# Patient Record
Sex: FEMALE | Race: WHITE | NOT HISPANIC OR LATINO | Employment: OTHER | URBAN - METROPOLITAN AREA
[De-identification: names, ages, dates, MRNs, and addresses within clinical notes are randomized per-mention and may not be internally consistent; named-entity substitution may affect disease eponyms.]

---

## 2019-04-23 ENCOUNTER — OFFICE VISIT (OUTPATIENT)
Dept: OTOLARYNGOLOGY | Facility: CLINIC | Age: 64
End: 2019-04-23
Payer: MEDICARE

## 2019-04-23 VITALS — HEART RATE: 71 BPM | DIASTOLIC BLOOD PRESSURE: 61 MMHG | SYSTOLIC BLOOD PRESSURE: 140 MMHG

## 2019-04-23 DIAGNOSIS — C06.9 MALIGNANT NEOPLASM OF ORAL CAVITY: Primary | ICD-10-CM

## 2019-04-23 PROCEDURE — 99204 PR OFFICE/OUTPT VISIT, NEW, LEVL IV, 45-59 MIN: ICD-10-PCS | Mod: S$GLB,,, | Performed by: OTOLARYNGOLOGY

## 2019-04-23 PROCEDURE — 1101F PR PT FALLS ASSESS DOC 0-1 FALLS W/OUT INJ PAST YR: ICD-10-PCS | Mod: CPTII,S$GLB,, | Performed by: OTOLARYNGOLOGY

## 2019-04-23 PROCEDURE — 99999 PR PBB SHADOW E&M-NEW PATIENT-LVL III: CPT | Mod: PBBFAC,,, | Performed by: OTOLARYNGOLOGY

## 2019-04-23 PROCEDURE — 1101F PT FALLS ASSESS-DOCD LE1/YR: CPT | Mod: CPTII,S$GLB,, | Performed by: OTOLARYNGOLOGY

## 2019-04-23 PROCEDURE — 99999 PR PBB SHADOW E&M-NEW PATIENT-LVL III: ICD-10-PCS | Mod: PBBFAC,,, | Performed by: OTOLARYNGOLOGY

## 2019-04-23 PROCEDURE — 99204 OFFICE O/P NEW MOD 45 MIN: CPT | Mod: S$GLB,,, | Performed by: OTOLARYNGOLOGY

## 2019-04-23 NOTE — PROGRESS NOTES
Head and Neck Surgery Consult    Seen in consultation from Dr. Johnson    HPI: Jennifer Andre is a 63 y.o. female presenting with an extensive and complicated oral cancer history beginning in 2013. 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 She has now developed a R-sided RMT SCC and presents for treatment. The lesion has been there for ~3 weeks and is only intermittently painful when eating pointy foods, such as chips. She denies new neck masses or voice changes. She is no longer smoking.    History reviewed. No pertinent past medical history.    History reviewed. No pertinent surgical history.    No current outpatient medications on file.    Review of patient's allergies indicates:   Allergen Reactions    Bactrim [sulfamethoxazole-trimethoprim]     Clindamycin     Keflex [cephalexin]     Lortab [hydrocodone-acetaminophen]        History reviewed. No pertinent family history.    Social History     Socioeconomic History    Marital status:      Spouse name: Not on file    Number of children: Not on file    Years of education: Not on file    Highest education level: Not on file   Occupational History    Not on file   Social Needs    Financial resource strain: Not on file    Food insecurity:     Worry: Not on file     Inability: Not on file    Transportation needs:     Medical: Not on file     Non-medical: Not on file   Tobacco Use    Smoking status: Former Smoker    Smokeless tobacco: Never Used   Substance and Sexual Activity    Alcohol use: Not on file    Drug use: Not on file    Sexual activity: Not on file   Lifestyle    Physical activity:     Days per week: Not on file     Minutes per session: Not on file    Stress: Not on file   Relationships    Social connections:     Talks on phone: Not on file     Gets together: Not on file     Attends Orthodoxy service: Not on  file     Active member of club or organization: Not on file     Attends meetings of clubs or organizations: Not on file     Relationship status: Not on file   Other Topics Concern    Not on file   Social History Narrative    Not on file       Review of Systems -  Constitutional: Denies having night sweats, constant fatigue, loss of appetite or recent substantial weight loss.  Eyes: Denies blurred vision or double vision.  Respiratory: Denies symptoms of shortness of breath, noisy breathing, hoarseness or chronic cough.  GI: Denies symptoms of heartburn, acid regurgitation, or the known presence of a hiatal hernia.  The remainder of a 10-point review of systems is negative    REVIEW OF RADIOLOGICAL FILMS AND RECORDS (PERSONALLY REVIEWED):  Reports from pathology reviewed - [facility]    REVIEW OF LABS (PERSONALLY REVIEWED)  Noncontributory    PHYSICAL EXAM:  Vitals - BP (!) 140/61 (Patient Position: Sitting)   Pulse 71   Constitutional -      General Appearance: well developed, well nourished, without obvious deformities apart from well-healed Commando incisions and bilateral MRND scars     Communication: speaks with a normal voice without hoarseness  Head & Face -     Overall: no obvious scars, lesions or masses except above. Remaining mandible centrally and on L is VERY atrophic, essentially the width of a recon plate. The R RMT/ramus area appears more substantial.     Parotid and submandibular glands: no masses or tenderness     Facial strength: normal and equal bilaterally  Eyes -      EOM intact  Ear, Nose, Mouth & Throat -     Ears: both left and right external auditory canals and TM's are normal, no external deformities     Nasal exam: mucosa is pink, septum is midline, visible turbinates are normal on anterior rhinoscopy     Mastication: teeth appear edentulous     Oral Cavity and oropharynx: mucosa, hard and soft palates, tongue, posterior pharyngeal wall, lips and gums are wihtout lesions apart from a 2cm  slightly mobile erythroleukoplakic lesion of the R RMT/buccal mucosa. Tonsils appear minimal  Respiratory:     Breathing unlabored, no stridor  Cardiovascular:     No JVD, capillary refill normal  Larynx: using the mirror for indirect laryngoscopy, the epiglottic, false cords, true cords, and pyriform sinuses are without lesions and the true vocal cords move normally  Neck: appears symmetric, and on palpation is without masses   Endocrine:     Thyroid: no asymmetry, thyromegaly, or thyroid nodules on palpation  Lymphatic:     No cervical lymphadenopathy  Cranial Nerves:      II: Pupillary reflexes normal     III, IV, VI: EOM normal     V: 1,2,3: normal sensation     VII: Normal strength in all divisions     IX, X: Normal voice, palatal elevation and sensation     XI: Shoulder strength normal       XII: Tongue mobility normal  Psychiatric:     Appropriate affect    ASSESSMENT: SCC of oral cavirt    PLAN: We discussed the optimal treatment will be surgical, and the extent of the surgery will depend upon bony involvement. If there is no bone involvement I would recommend a marginal mandibulectomy and likely a buccinator flap reconstruction. If there is bony involvement the resection will be much larger - segmental versus subtotal mandibulectomy with fibula free flap reconstruction. I will order STAT CT with 3D recon to assess bony invasion status as well as remaining mandibular anatomy. If there is tayo bone invasion we will prepare for a free flap with subsequent CT with contrast of neck and lower extremities. If there is no bony involvement we will proceed with surgery forthwith.  They will RTC with me in 1 week to review CT scan and plan treatment.       Navdeep Dangelo

## 2019-04-23 NOTE — LETTER
April 23, 2019      Chintan Johnson MD  [address]   [address]  [facility]  [place] [address]           [facility] S[facility]  [address]  [facility] [address]  Phone: [phone]  Fax: [phone]          Patient: Jennifer Andre   MR Number: [id number]   YOB: 1955   Date of Visit: 4/23/2019       Dear Dr. Chintan Johnson:    Thank you for referring Jennifer Andre to me for evaluation. Attached you will find relevant portions of my assessment and plan of care.    If you have questions, please do not hesitate to call me. I look forward to following Jennifer Andre along with you.    Sincerely,    Navdeep Dangelo MD    Enclosure  CC:  No Recipients    If you would like to receive this communication electronically, please contact [email or web address] or [phone] to request more information on [employer] Link access.    For providers and/or their staff who would like to refer a patient to [facility], please contact us through our one-stop-shop provider referral line, [facility], at [phone].    If you feel you have received this communication in error or would no longer like to receive these types of communications, please e-mail [email or web address]

## 2019-04-25 ENCOUNTER — TELEPHONE (OUTPATIENT)
Dept: OTOLARYNGOLOGY | Facility: CLINIC | Age: 64
End: 2019-04-25

## 2019-04-25 NOTE — TELEPHONE ENCOUNTER
----- Message from Kell Johnson sent at 4/25/2019 10:15 AM CDT -----  Contact: Pt  Needs Advice    Reason for call: Pt has questions about her 04/30 appt         Communication Preference: # [phone]    Additional Information:

## 2019-05-07 ENCOUNTER — OFFICE VISIT (OUTPATIENT)
Dept: OTOLARYNGOLOGY | Facility: CLINIC | Age: 64
End: 2019-05-07
Payer: MEDICARE

## 2019-05-07 VITALS
HEART RATE: 76 BPM | DIASTOLIC BLOOD PRESSURE: 77 MMHG | TEMPERATURE: 98 F | SYSTOLIC BLOOD PRESSURE: 128 MMHG | WEIGHT: 165.56 LBS

## 2019-05-07 DIAGNOSIS — C06.9 MALIGNANT NEOPLASM OF ORAL CAVITY: Primary | ICD-10-CM

## 2019-05-07 PROCEDURE — 99999 PR PBB SHADOW E&M-EST. PATIENT-LVL IV: CPT | Mod: PBBFAC,,, | Performed by: OTOLARYNGOLOGY

## 2019-05-07 PROCEDURE — 99999 PR PBB SHADOW E&M-EST. PATIENT-LVL IV: ICD-10-PCS | Mod: PBBFAC,,, | Performed by: OTOLARYNGOLOGY

## 2019-05-07 PROCEDURE — 1101F PR PT FALLS ASSESS DOC 0-1 FALLS W/OUT INJ PAST YR: ICD-10-PCS | Mod: CPTII,S$GLB,, | Performed by: OTOLARYNGOLOGY

## 2019-05-07 PROCEDURE — 99214 PR OFFICE/OUTPT VISIT, EST, LEVL IV, 30-39 MIN: ICD-10-PCS | Mod: S$GLB,,, | Performed by: OTOLARYNGOLOGY

## 2019-05-07 PROCEDURE — 1101F PT FALLS ASSESS-DOCD LE1/YR: CPT | Mod: CPTII,S$GLB,, | Performed by: OTOLARYNGOLOGY

## 2019-05-07 PROCEDURE — 99214 OFFICE O/P EST MOD 30 MIN: CPT | Mod: S$GLB,,, | Performed by: OTOLARYNGOLOGY

## 2019-05-07 NOTE — PROGRESS NOTES
Head & Neck Surgery Follow-Up    Treatment History:  1. SCC - L Segmental mandibulectomy, chemo/RT - [facility] - 2013  2. L mandible ORN, osteocutaneous RFFF - [facility] - ~2015  3. Biopsy R RMT - [place] - 04/2018    Interval history:  She has undergone [place] in [place] demonstrating no tayo bony invasion but with a very, very atrophic remaining mandible. 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 She reports slightly increased tenderness of the R RMT lesion but no other changes.    Exam:  Ulcerative lesion of R gingiva unchanged  No palpable LAD  Scar band of L buccal mucosa is cause of extra-articular trismus    CT: Severely atrophic native mandible, good bony continuity of reconstructed forearm flap. Small LAD present submental area (0.7cm)    A/P: Discussed no radiographic evidence of bony invasion, however for structural reasons may likely require segmental mandibulectomy. Given her trismus symptoms and the status of the forearm bone construct, near-total resection and fibula flap may provide best option for functional rehabilitation. Options include repositioning/double-barreling the forearm bone in situ then re-plating to contralateral fibula construct versus removal of prior construct and subtotal reconstruction with one or both fibulas. We discussed the priorities of this operation as 1: remove recurrent cancer 2: restore functioning mandible and finally 3: trismus/dental rehabilitation. All of these may not be possible, or not possible in a single operation. We also discussed the need for PEG and trach. Will plan on utilizing the previous Commando incision she already has in the neck.

## 2019-05-10 ENCOUNTER — ANESTHESIA EVENT (OUTPATIENT)
Dept: SURGERY | Facility: HOSPITAL | Age: 64
DRG: 012 | End: 2019-05-10
Payer: MEDICARE

## 2019-05-10 DIAGNOSIS — Z01.818 PREOPERATIVE TESTING: Primary | ICD-10-CM

## 2019-05-10 RX ORDER — CLOBETASOL PROPIONATE 0.5 MG/G
CREAM TOPICAL 2 TIMES DAILY PRN
COMMUNITY
End: 2022-09-22 | Stop reason: CLARIF

## 2019-05-10 NOTE — PRE-PROCEDURE INSTRUCTIONS
Patient stated has not had any problems with anesthesia in the past. Will need medical clearance from your PCP, Dr. Marcello Benavidez. Will also need labs and ekg. She is not planning to come to [place] until her surgery.Our office will send your PCP what we need and the fax number to send the test results and clearance. Preop instructions given. Hold asa, asa containing products, nsaids, vitamins and supplements one week prior to surgery. Medication instruction given. Shower the night before surgery and the morning of surgery with an antibacterial soap( hibiclens or dial antibacterial soap).  Nothing on the skin once shower. Do not apply any deodorant, lotion, powder, perfume,or aftershave.  No makeup or moisturizer. No fingernail polish or jewelry going to surgery.   May have solid foods, gum, and hard candy until 8 hours before surgery/procedure time.  May have clear liquids( water, gatorade, powerade or apple juice) until 2 hours prior to surgery/procedure time.  No red or orange drinks. If in doubt , drink water. Nothing to drink 2 hours before arrival time for surgery/procedure. If you are told to take medication in the morning of surgery, it may be taken with a sip of water. If your doctor tells you something different pertaining to when to stop eating or drinking, follow your doctor's instructions.   Call for changes in status or questions.   Directions given to the surgery center. Verbalizes understanding.

## 2019-05-10 NOTE — ANESTHESIA PREPROCEDURE EVALUATION
Anesthesia Assessment: Preoperative EQUATION     Planned Procedure: Procedure(s) (LRB):  MANDIBULECTOMY (Right)  TRACHEOTOMY (N/A)  DISSECTION, NECK (Right)  CREATION, FREE FLAP (N/A)  REMOVAL, IMPLANT (Left)  Requested Anesthesia Type:General  Surgeon: Wallace Santiago MD  Service: ENT  Known or anticipated Date of Surgery:5/30/2019     Surgeon notes: reviewed     Electronic QUestionnaire Assessment completed via nurse interview with patient.    NO AQ  Triage considerations:      The patient has no apparent active cardiac condition (No unstable coronary Syndrome such as severe unstable angina or recent [<1 month] myocardial infarction, decompensated CHF, severe valvular   disease or significant arrhythmia)     Previous anesthesia records:No problems and Not available     Last PCP note: outside [facility] , HAS APPT FOR 5/14  Subspecialty notes: ENT     Other important co-morbidities: MALIGNANT NEOPLASM OF ORAL CAVITY     Tests already available:  No recent tests.                            Instructions given. (See in Nurse's note)     Optimization:      Medical Opinion Indicated                                        Plan:    Testing:  Hemoglobin, BMP, T&S, EKG and HEMATOCRIT                              Consultation:Patient's PCP for re-evaluation Patient's PCP for a statement of optimization                            Patient  has previously scheduled Medical Appointment:NONE     Navigation: Tests Scheduled. TBD                        Consults scheduled.5/14                        Results will be tracked by Preop Clinic.                           S          Electronically signed by Chelly Anna RN at 5/10/2019 11:15 AM       Pre-admit on 5/30/2019            Detailed Report     5/21 Labs and EKG resulted and noted.Medical clearance from [facility] on 5/12. ( all scanned to surgery)  5/21 EKG reviewed by Dr. Young.                                                                                                          05/10/2019  Jennifer Andre is a 63 y.o., female.    Anesthesia Evaluation         Review of Systems  Anesthesia Hx:  No problems with previous Anesthesia Denies Family Hx of Anesthesia complications.    Social:  Former Smoker, No Alcohol Use    Hematology/Oncology:  Hematology Normal       Oral Current/Recent Cancer.  --  Cancer in past history:  chemotherapy, radiation and surgery  Oncology Comments: 2013 ORAL CANCER BEGAN, CURRENT: MALIGNANT NEOPLASM OF ORAL CAVITY     EENT/Dental:   Jaw Problems: LIMITED OPENING OF MOUTH  Cardiovascular:   Exercise tolerance: good  Denies Angina.  Functional Capacity 4 METS, ABLE TO CLIMB 2 FLIGHTS OF STAIRS    Pulmonary:  Pulmonary Normal  Denies Shortness of breath.  Denies Recent URI.    Renal/:  Renal/ Normal     Hepatic/GI:  Hepatic/GI Normal    Musculoskeletal:  Musculoskeletal General/Symptoms: Functional capacity is ambulatory without assistance.    Neurological:  Neurology Normal    Endocrine:  Endocrine Normal    Dermatological:   PSORIASIS   Psych:  Psychiatric Normal           Physical Exam  General:  Well nourished    Airway/Jaw/Neck:  Airway Findings: Mouth Opening: Small, but > 3cm Tongue: Normal  General Airway Assessment: Adult  Oropharynx Findings: (mandibulectomy left )  Mallampati: II  TM Distance: 4 - 6 cm       Chest/Lungs:  Chest/Lungs Clear    Heart/Vascular:  Heart Findings: Normal            Anesthesia Plan  Type of Anesthesia, risks & benefits discussed:  Anesthesia Type:  general  Patient's Preference:   Intra-op Monitoring Plan: arterial line  Intra-op Monitoring Plan Comments:   Post Op Pain Control Plan: multimodal analgesia, IV/PO Opioids PRN and per primary service following discharge from PACU  Post Op Pain Control Plan Comments: Opioids and analgesic adjuvants as needed  Regional blocks if applicable/indicated  Induction:   IV  Beta Blocker:  Patient is not currently on a Beta-Blocker (No further documentation  required).       Informed Consent: Patient understands risks and agrees with Anesthesia plan.  Questions answered. Anesthesia consent signed with patient.  ASA Score: 3     Day of Surgery Review of History & Physical:    H&P update referred to the surgeon.     Anesthesia Plan Notes: I have personally evaluated the patient and discussed risk/benefits/alternatives of general anesthesia.        Ready For Surgery From Anesthesia Perspective.

## 2019-05-10 NOTE — PRE ADMISSION SCREENING
Anesthesia Assessment: Preoperative EQUATION    Planned Procedure: Procedure(s) (LRB):  MANDIBULECTOMY (Right)  TRACHEOTOMY (N/A)  DISSECTION, NECK (Right)  CREATION, FREE FLAP (N/A)  REMOVAL, IMPLANT (Left)  Requested Anesthesia Type:General  Surgeon: Wallace Santiago MD  Service: ENT  Known or anticipated Date of Surgery:5/30/2019    Surgeon notes: reviewed    Electronic QUestionnaire Assessment completed via nurse interview with patient.    NO AQ  Triage considerations:     The patient has no apparent active cardiac condition (No unstable coronary Syndrome such as severe unstable angina or recent [<1 month] myocardial infarction, decompensated CHF, severe valvular   disease or significant arrhythmia)    Previous anesthesia records:No problems and Not available    Last PCP note: outside [facility] , HAS APPT FOR 5/14  Subspecialty notes: ENT    Other important co-morbidities: MALIGNANT NEOPLASM OF ORAL CAVITY     Tests already available:  No recent tests.            Instructions given. (See in Nurse's note)    Optimization:      Medical Opinion Indicated           Plan:    Testing:  Hemoglobin, BMP, T&S, EKG and HEMATOCRIT        Consultation:Patient's PCP for re-evaluation Patient's PCP for a statement of optimization      Patient  has previously scheduled Medical Appointment:NONE    Navigation: Tests Scheduled. TBD             Consults scheduled.5/14             Results will be tracked by Preop Clinic.                 S

## 2019-05-29 ENCOUNTER — TELEPHONE (OUTPATIENT)
Dept: OTOLARYNGOLOGY | Facility: CLINIC | Age: 64
End: 2019-05-29

## 2019-05-30 ENCOUNTER — ANESTHESIA (OUTPATIENT)
Dept: SURGERY | Facility: HOSPITAL | Age: 64
DRG: 012 | End: 2019-05-30
Payer: MEDICARE

## 2019-05-30 ENCOUNTER — HOSPITAL ENCOUNTER (INPATIENT)
Facility: HOSPITAL | Age: 64
LOS: 12 days | Discharge: HOME HEALTH | DRG: 012 | End: 2019-06-11
Attending: OTOLARYNGOLOGY | Admitting: OTOLARYNGOLOGY
Payer: MEDICARE

## 2019-05-30 DIAGNOSIS — Z01.818 PREOPERATIVE TESTING: ICD-10-CM

## 2019-05-30 DIAGNOSIS — C06.9 MALIGNANT NEOPLASM OF ORAL CAVITY: Primary | ICD-10-CM

## 2019-05-30 DIAGNOSIS — E44.0 MODERATE MALNUTRITION: ICD-10-CM

## 2019-05-30 DIAGNOSIS — R26.89 DECREASED MOBILITY: ICD-10-CM

## 2019-05-30 DIAGNOSIS — Z93.0 TRACHEOSTOMY IN PLACE: ICD-10-CM

## 2019-05-30 DIAGNOSIS — K21.9 GASTROESOPHAGEAL REFLUX DISEASE, ESOPHAGITIS PRESENCE NOT SPECIFIED: ICD-10-CM

## 2019-05-30 LAB
ABO + RH BLD: NORMAL
ALBUMIN SERPL BCP-MCNC: 2.6 G/DL (ref 3.5–5.2)
ALLENS TEST: ABNORMAL
ALP SERPL-CCNC: 72 U/L (ref 55–135)
ALT SERPL W/O P-5'-P-CCNC: 8 U/L (ref 10–44)
ANION GAP SERPL CALC-SCNC: 9 MMOL/L (ref 8–16)
APTT BLDCRRT: 21 SEC (ref 21–32)
AST SERPL-CCNC: 17 U/L (ref 10–40)
BASOPHILS # BLD AUTO: 0.04 K/UL (ref 0–0.2)
BASOPHILS NFR BLD: 0.3 % (ref 0–1.9)
BILIRUB SERPL-MCNC: 0.3 MG/DL (ref 0.1–1)
BLD GP AB SCN CELLS X3 SERPL QL: NORMAL
BUN SERPL-MCNC: 11 MG/DL (ref 8–23)
CALCIUM SERPL-MCNC: 7.3 MG/DL (ref 8.7–10.5)
CHLORIDE SERPL-SCNC: 110 MMOL/L (ref 95–110)
CO2 SERPL-SCNC: 20 MMOL/L (ref 23–29)
CREAT SERPL-MCNC: 0.7 MG/DL (ref 0.5–1.4)
DELSYS: ABNORMAL
DIFFERENTIAL METHOD: ABNORMAL
EOSINOPHIL # BLD AUTO: 0 K/UL (ref 0–0.5)
EOSINOPHIL NFR BLD: 0 % (ref 0–8)
ERYTHROCYTE [DISTWIDTH] IN BLOOD BY AUTOMATED COUNT: 13.6 % (ref 11.5–14.5)
EST. GFR  (AFRICAN AMERICAN): >60 ML/MIN/1.73 M^2
EST. GFR  (NON AFRICAN AMERICAN): >60 ML/MIN/1.73 M^2
FIO2: 28
FLOW: 5
GLUCOSE SERPL-MCNC: 142 MG/DL (ref 70–110)
HCO3 UR-SCNC: 21.4 MMOL/L (ref 24–28)
HCT VFR BLD AUTO: 33.2 % (ref 37–48.5)
HGB BLD-MCNC: 10.6 G/DL (ref 12–16)
IMM GRANULOCYTES # BLD AUTO: 0.07 K/UL (ref 0–0.04)
IMM GRANULOCYTES NFR BLD AUTO: 0.5 % (ref 0–0.5)
INR PPP: 1.1 (ref 0.8–1.2)
LYMPHOCYTES # BLD AUTO: 0.5 K/UL (ref 1–4.8)
LYMPHOCYTES NFR BLD: 3.6 % (ref 18–48)
MAGNESIUM SERPL-MCNC: 1.7 MG/DL (ref 1.6–2.6)
MCH RBC QN AUTO: 29.8 PG (ref 27–31)
MCHC RBC AUTO-ENTMCNC: 31.9 G/DL (ref 32–36)
MCV RBC AUTO: 93 FL (ref 82–98)
MODE: ABNORMAL
MONOCYTES # BLD AUTO: 0.6 K/UL (ref 0.3–1)
MONOCYTES NFR BLD: 4.5 % (ref 4–15)
NEUTROPHILS # BLD AUTO: 12.2 K/UL (ref 1.8–7.7)
NEUTROPHILS NFR BLD: 91.1 % (ref 38–73)
NRBC BLD-RTO: 0 /100 WBC
PCO2 BLDA: 36.4 MMHG (ref 35–45)
PH SMN: 7.38 [PH] (ref 7.35–7.45)
PHOSPHATE SERPL-MCNC: 3.7 MG/DL (ref 2.7–4.5)
PLATELET # BLD AUTO: 160 K/UL (ref 150–350)
PMV BLD AUTO: 10.9 FL (ref 9.2–12.9)
PO2 BLDA: 77 MMHG (ref 80–100)
POC BE: -4 MMOL/L
POC SATURATED O2: 95 % (ref 95–100)
POC TCO2: 22 MMOL/L (ref 23–27)
POCT GLUCOSE: 135 MG/DL (ref 70–110)
POTASSIUM SERPL-SCNC: 4.4 MMOL/L (ref 3.5–5.1)
PROT SERPL-MCNC: 5.2 G/DL (ref 6–8.4)
PROTHROMBIN TIME: 11.5 SEC (ref 9–12.5)
RBC # BLD AUTO: 3.56 M/UL (ref 4–5.4)
SAMPLE: ABNORMAL
SITE: ABNORMAL
SODIUM SERPL-SCNC: 139 MMOL/L (ref 136–145)
WBC # BLD AUTO: 13.44 K/UL (ref 3.9–12.7)

## 2019-05-30 PROCEDURE — 99291 PR CRITICAL CARE, E/M 30-74 MINUTES: ICD-10-PCS | Mod: GC,,, | Performed by: ANESTHESIOLOGY

## 2019-05-30 PROCEDURE — 41116 PR EXCIS FLOOR MOUTH LESION: ICD-10-PCS | Mod: 51,,, | Performed by: OTOLARYNGOLOGY

## 2019-05-30 PROCEDURE — 37799 UNLISTED PX VASCULAR SURGERY: CPT

## 2019-05-30 PROCEDURE — 27201037 HC PRESSURE MONITORING SET UP

## 2019-05-30 PROCEDURE — 15757 PR FREE SKIN FLAP W MICROVASC ANAST: ICD-10-PCS | Mod: ,,, | Performed by: OTOLARYNGOLOGY

## 2019-05-30 PROCEDURE — 20000000 HC ICU ROOM

## 2019-05-30 PROCEDURE — D9220A PRA ANESTHESIA: Mod: CRNA,,, | Performed by: NURSE ANESTHETIST, CERTIFIED REGISTERED

## 2019-05-30 PROCEDURE — 88331 TISSUE SPECIMEN TO PATHOLOGY - SURGERY: ICD-10-PCS | Mod: 26,,, | Performed by: PATHOLOGY

## 2019-05-30 PROCEDURE — 99900026 HC AIRWAY MAINTENANCE (STAT)

## 2019-05-30 PROCEDURE — D9220A PRA ANESTHESIA: ICD-10-PCS | Mod: CRNA,,, | Performed by: NURSE ANESTHETIST, CERTIFIED REGISTERED

## 2019-05-30 PROCEDURE — 63600175 PHARM REV CODE 636 W HCPCS: Performed by: NURSE ANESTHETIST, CERTIFIED REGISTERED

## 2019-05-30 PROCEDURE — 88305 TISSUE EXAM BY PATHOLOGIST: CPT | Performed by: PATHOLOGY

## 2019-05-30 PROCEDURE — 15004 PR WND PREP PED, FACE/NCK/HND/FT/GEN 1ST 100 CM: ICD-10-PCS | Mod: ,,, | Performed by: OTOLARYNGOLOGY

## 2019-05-30 PROCEDURE — 84100 ASSAY OF PHOSPHORUS: CPT

## 2019-05-30 PROCEDURE — 35701 EXPL N/FLWD SURG NECK ART: CPT | Mod: 51,RT,, | Performed by: OTOLARYNGOLOGY

## 2019-05-30 PROCEDURE — 88305 TISSUE SPECIMEN TO PATHOLOGY - SURGERY: ICD-10-PCS | Mod: 26,,, | Performed by: PATHOLOGY

## 2019-05-30 PROCEDURE — 37000009 HC ANESTHESIA EA ADD 15 MINS: Performed by: OTOLARYNGOLOGY

## 2019-05-30 PROCEDURE — 15120 SPLT AGRFT F/S/N/H/F/G/M 1ST: CPT | Mod: 51,,, | Performed by: OTOLARYNGOLOGY

## 2019-05-30 PROCEDURE — D9220A PRA ANESTHESIA: ICD-10-PCS | Mod: ANES,,, | Performed by: ANESTHESIOLOGY

## 2019-05-30 PROCEDURE — 35701 PR EXPLORATION, W/O SURG REPAIR, ARTERY, NECK, CAROTID/SUBCL: ICD-10-PCS | Mod: 51,RT,, | Performed by: OTOLARYNGOLOGY

## 2019-05-30 PROCEDURE — 21045: ICD-10-PCS | Mod: 51,,, | Performed by: OTOLARYNGOLOGY

## 2019-05-30 PROCEDURE — 15757 FREE SKIN FLAP MICROVASC: CPT | Mod: ,,, | Performed by: OTOLARYNGOLOGY

## 2019-05-30 PROCEDURE — C1729 CATH, DRAINAGE: HCPCS | Performed by: OTOLARYNGOLOGY

## 2019-05-30 PROCEDURE — 17999 UNLISTD PX SKN MUC MEMB SUBQ: CPT | Mod: ,,, | Performed by: OTOLARYNGOLOGY

## 2019-05-30 PROCEDURE — 38724 PR REMOVAL NODES, NECK,CERV MOD RAD: ICD-10-PCS | Mod: RT,,, | Performed by: OTOLARYNGOLOGY

## 2019-05-30 PROCEDURE — 86850 RBC ANTIBODY SCREEN: CPT

## 2019-05-30 PROCEDURE — 85730 THROMBOPLASTIN TIME PARTIAL: CPT

## 2019-05-30 PROCEDURE — 80053 COMPREHEN METABOLIC PANEL: CPT

## 2019-05-30 PROCEDURE — 88331 PATH CONSLTJ SURG 1 BLK 1SPC: CPT | Mod: 26,,, | Performed by: PATHOLOGY

## 2019-05-30 PROCEDURE — 31600 PR TRACHEOSTOMY, PLANNED: ICD-10-PCS | Mod: 51,,, | Performed by: OTOLARYNGOLOGY

## 2019-05-30 PROCEDURE — 17999 PR ACELL DERM MATRIX IMPLT OTHER THAN BREAST/TRK: ICD-10-PCS | Mod: ,,, | Performed by: OTOLARYNGOLOGY

## 2019-05-30 PROCEDURE — 21244 PR RECONSTR MANDIBLE,BONE PLATE: ICD-10-PCS | Mod: 51,,, | Performed by: OTOLARYNGOLOGY

## 2019-05-30 PROCEDURE — 41116 EXCISION OF MOUTH LESION: CPT | Mod: 51,,, | Performed by: OTOLARYNGOLOGY

## 2019-05-30 PROCEDURE — 21045 EXTENSIVE JAW SURGERY: CPT | Mod: 51,,, | Performed by: OTOLARYNGOLOGY

## 2019-05-30 PROCEDURE — C1713 ANCHOR/SCREW BN/BN,TIS/BN: HCPCS | Performed by: OTOLARYNGOLOGY

## 2019-05-30 PROCEDURE — 83735 ASSAY OF MAGNESIUM: CPT

## 2019-05-30 PROCEDURE — 31600 PLANNED TRACHEOSTOMY: CPT | Mod: 51,,, | Performed by: OTOLARYNGOLOGY

## 2019-05-30 PROCEDURE — 25000003 PHARM REV CODE 250: Performed by: OTOLARYNGOLOGY

## 2019-05-30 PROCEDURE — S0028 INJECTION, FAMOTIDINE, 20 MG: HCPCS | Performed by: NURSE ANESTHETIST, CERTIFIED REGISTERED

## 2019-05-30 PROCEDURE — 36000710: Performed by: OTOLARYNGOLOGY

## 2019-05-30 PROCEDURE — 94761 N-INVAS EAR/PLS OXIMETRY MLT: CPT

## 2019-05-30 PROCEDURE — 21244 RECONSTRUCTION OF LOWER JAW: CPT | Mod: 51,,, | Performed by: OTOLARYNGOLOGY

## 2019-05-30 PROCEDURE — 88311 DECALCIFY TISSUE: CPT | Mod: 26,,, | Performed by: PATHOLOGY

## 2019-05-30 PROCEDURE — 27800903 OPTIME MED/SURG SUP & DEVICES OTHER IMPLANTS: Performed by: OTOLARYNGOLOGY

## 2019-05-30 PROCEDURE — 15004 WOUND PREP F/N/HF/G: CPT | Mod: ,,, | Performed by: OTOLARYNGOLOGY

## 2019-05-30 PROCEDURE — 37000008 HC ANESTHESIA 1ST 15 MINUTES: Performed by: OTOLARYNGOLOGY

## 2019-05-30 PROCEDURE — 63600175 PHARM REV CODE 636 W HCPCS: Performed by: OTOLARYNGOLOGY

## 2019-05-30 PROCEDURE — 27201423 OPTIME MED/SURG SUP & DEVICES STERILE SUPPLY: Performed by: OTOLARYNGOLOGY

## 2019-05-30 PROCEDURE — 38724 REMOVAL OF LYMPH NODES NECK: CPT | Mod: RT,,, | Performed by: OTOLARYNGOLOGY

## 2019-05-30 PROCEDURE — 82803 BLOOD GASES ANY COMBINATION: CPT

## 2019-05-30 PROCEDURE — C1769 GUIDE WIRE: HCPCS | Performed by: OTOLARYNGOLOGY

## 2019-05-30 PROCEDURE — 85025 COMPLETE CBC W/AUTO DIFF WBC: CPT

## 2019-05-30 PROCEDURE — 15120 PR SPLIT GRFT,HEAD,FAC,HAND,FEET <100 SQCM: ICD-10-PCS | Mod: 51,,, | Performed by: OTOLARYNGOLOGY

## 2019-05-30 PROCEDURE — 63600175 PHARM REV CODE 636 W HCPCS: Performed by: STUDENT IN AN ORGANIZED HEALTH CARE EDUCATION/TRAINING PROGRAM

## 2019-05-30 PROCEDURE — 36000711: Performed by: OTOLARYNGOLOGY

## 2019-05-30 PROCEDURE — 88311 TISSUE SPECIMEN TO PATHOLOGY - SURGERY: ICD-10-PCS | Mod: 26,,, | Performed by: PATHOLOGY

## 2019-05-30 PROCEDURE — 25000003 PHARM REV CODE 250: Performed by: NURSE ANESTHETIST, CERTIFIED REGISTERED

## 2019-05-30 PROCEDURE — 85610 PROTHROMBIN TIME: CPT

## 2019-05-30 PROCEDURE — 27000221 HC OXYGEN, UP TO 24 HOURS

## 2019-05-30 PROCEDURE — 99900035 HC TECH TIME PER 15 MIN (STAT)

## 2019-05-30 PROCEDURE — 99291 CRITICAL CARE FIRST HOUR: CPT | Mod: GC,,, | Performed by: ANESTHESIOLOGY

## 2019-05-30 PROCEDURE — D9220A PRA ANESTHESIA: Mod: ANES,,, | Performed by: ANESTHESIOLOGY

## 2019-05-30 DEVICE — IMPLANTABLE DEVICE: Type: IMPLANTABLE DEVICE | Site: MANDIBLE | Status: FUNCTIONAL

## 2019-05-30 DEVICE — GRAFT DERMAL ACELLULAR 2X4CM: Type: IMPLANTABLE DEVICE | Site: MANDIBLE | Status: FUNCTIONAL

## 2019-05-30 DEVICE — SCREW BONE MAX 2X10MM TI SILVE: Type: IMPLANTABLE DEVICE | Site: MANDIBLE | Status: FUNCTIONAL

## 2019-05-30 DEVICE — SCREW BONE MAX 2X12MM TI SILVE: Type: IMPLANTABLE DEVICE | Site: MANDIBLE | Status: FUNCTIONAL

## 2019-05-30 RX ORDER — FAMOTIDINE 10 MG/ML
INJECTION INTRAVENOUS
Status: DISCONTINUED | OUTPATIENT
Start: 2019-05-30 | End: 2019-05-30

## 2019-05-30 RX ORDER — ROCURONIUM BROMIDE 10 MG/ML
INJECTION, SOLUTION INTRAVENOUS
Status: DISCONTINUED | OUTPATIENT
Start: 2019-05-30 | End: 2019-05-30

## 2019-05-30 RX ORDER — GLYCOPYRROLATE 0.2 MG/ML
INJECTION INTRAMUSCULAR; INTRAVENOUS
Status: DISCONTINUED | OUTPATIENT
Start: 2019-05-30 | End: 2019-05-30

## 2019-05-30 RX ORDER — ACETAMINOPHEN 10 MG/ML
INJECTION, SOLUTION INTRAVENOUS
Status: DISCONTINUED | OUTPATIENT
Start: 2019-05-30 | End: 2019-05-30

## 2019-05-30 RX ORDER — DEXTROSE MONOHYDRATE, SODIUM CHLORIDE, AND POTASSIUM CHLORIDE 50; 1.49; 9 G/1000ML; G/1000ML; G/1000ML
INJECTION, SOLUTION INTRAVENOUS CONTINUOUS
Status: DISCONTINUED | OUTPATIENT
Start: 2019-05-30 | End: 2019-05-31

## 2019-05-30 RX ORDER — MORPHINE SULFATE 2 MG/ML
1 INJECTION, SOLUTION INTRAMUSCULAR; INTRAVENOUS EVERY 4 HOURS PRN
Status: DISCONTINUED | OUTPATIENT
Start: 2019-05-30 | End: 2019-05-31

## 2019-05-30 RX ORDER — ONDANSETRON 2 MG/ML
INJECTION INTRAMUSCULAR; INTRAVENOUS
Status: DISCONTINUED | OUTPATIENT
Start: 2019-05-30 | End: 2019-05-30

## 2019-05-30 RX ORDER — LIDOCAINE HYDROCHLORIDE AND EPINEPHRINE 10; 10 MG/ML; UG/ML
INJECTION, SOLUTION INFILTRATION; PERINEURAL
Status: DISCONTINUED | OUTPATIENT
Start: 2019-05-30 | End: 2019-05-30 | Stop reason: HOSPADM

## 2019-05-30 RX ORDER — LIDOCAINE HCL/PF 100 MG/5ML
SYRINGE (ML) INTRAVENOUS
Status: DISCONTINUED | OUTPATIENT
Start: 2019-05-30 | End: 2019-05-30

## 2019-05-30 RX ORDER — PHENYLEPHRINE HYDROCHLORIDE 10 MG/ML
INJECTION INTRAVENOUS
Status: DISCONTINUED | OUTPATIENT
Start: 2019-05-30 | End: 2019-05-30

## 2019-05-30 RX ORDER — DEXMEDETOMIDINE HYDROCHLORIDE 100 UG/ML
INJECTION, SOLUTION INTRAVENOUS
Status: DISCONTINUED | OUTPATIENT
Start: 2019-05-30 | End: 2019-05-30

## 2019-05-30 RX ORDER — PROPOFOL 10 MG/ML
VIAL (ML) INTRAVENOUS
Status: DISCONTINUED | OUTPATIENT
Start: 2019-05-30 | End: 2019-05-30

## 2019-05-30 RX ORDER — ENOXAPARIN SODIUM 100 MG/ML
40 INJECTION SUBCUTANEOUS EVERY 24 HOURS
Status: DISCONTINUED | OUTPATIENT
Start: 2019-05-30 | End: 2019-06-11 | Stop reason: HOSPADM

## 2019-05-30 RX ORDER — KETAMINE HCL IN 0.9 % NACL 50 MG/5 ML
SYRINGE (ML) INTRAVENOUS
Status: DISCONTINUED | OUTPATIENT
Start: 2019-05-30 | End: 2019-05-30

## 2019-05-30 RX ORDER — LEVOFLOXACIN 5 MG/ML
750 INJECTION, SOLUTION INTRAVENOUS
Status: DISCONTINUED | OUTPATIENT
Start: 2019-05-30 | End: 2019-05-30

## 2019-05-30 RX ORDER — MIDAZOLAM HYDROCHLORIDE 1 MG/ML
INJECTION, SOLUTION INTRAMUSCULAR; INTRAVENOUS
Status: DISCONTINUED | OUTPATIENT
Start: 2019-05-30 | End: 2019-05-30

## 2019-05-30 RX ORDER — SODIUM CHLORIDE 0.9 % (FLUSH) 0.9 %
10 SYRINGE (ML) INJECTION
Status: DISCONTINUED | OUTPATIENT
Start: 2019-05-30 | End: 2019-05-30

## 2019-05-30 RX ORDER — FENTANYL CITRATE 50 UG/ML
INJECTION, SOLUTION INTRAMUSCULAR; INTRAVENOUS
Status: DISCONTINUED | OUTPATIENT
Start: 2019-05-30 | End: 2019-05-30

## 2019-05-30 RX ORDER — HYDROMORPHONE HYDROCHLORIDE 1 MG/ML
0.2 INJECTION, SOLUTION INTRAMUSCULAR; INTRAVENOUS; SUBCUTANEOUS EVERY 5 MIN PRN
Status: CANCELLED | OUTPATIENT
Start: 2019-05-30

## 2019-05-30 RX ORDER — LIDOCAINE HYDROCHLORIDE 40 MG/ML
INJECTION, SOLUTION RETROBULBAR
Status: DISCONTINUED | OUTPATIENT
Start: 2019-05-30 | End: 2019-05-30 | Stop reason: HOSPADM

## 2019-05-30 RX ORDER — SODIUM CHLORIDE 9 MG/ML
INJECTION, SOLUTION INTRAVENOUS CONTINUOUS PRN
Status: DISCONTINUED | OUTPATIENT
Start: 2019-05-30 | End: 2019-05-30

## 2019-05-30 RX ORDER — HEPARIN SODIUM 1000 [USP'U]/ML
INJECTION, SOLUTION INTRAVENOUS; SUBCUTANEOUS
Status: DISCONTINUED | OUTPATIENT
Start: 2019-05-30 | End: 2019-05-30 | Stop reason: HOSPADM

## 2019-05-30 RX ADMIN — FENTANYL CITRATE 25 MCG: 50 INJECTION, SOLUTION INTRAMUSCULAR; INTRAVENOUS at 09:05

## 2019-05-30 RX ADMIN — PHENYLEPHRINE HYDROCHLORIDE 100 MCG: 10 INJECTION INTRAVENOUS at 03:05

## 2019-05-30 RX ADMIN — SODIUM CHLORIDE, SODIUM GLUCONATE, SODIUM ACETATE, POTASSIUM CHLORIDE, MAGNESIUM CHLORIDE, SODIUM PHOSPHATE, DIBASIC, AND POTASSIUM PHOSPHATE: .53; .5; .37; .037; .03; .012; .00082 INJECTION, SOLUTION INTRAVENOUS at 11:05

## 2019-05-30 RX ADMIN — Medication 30 MG: at 12:05

## 2019-05-30 RX ADMIN — ROCURONIUM BROMIDE 50 MG: 10 INJECTION, SOLUTION INTRAVENOUS at 12:05

## 2019-05-30 RX ADMIN — ROCURONIUM BROMIDE 10 MG: 10 INJECTION, SOLUTION INTRAVENOUS at 06:05

## 2019-05-30 RX ADMIN — FENTANYL CITRATE 50 MCG: 50 INJECTION, SOLUTION INTRAMUSCULAR; INTRAVENOUS at 09:05

## 2019-05-30 RX ADMIN — PHENYLEPHRINE HYDROCHLORIDE 100 MCG: 10 INJECTION INTRAVENOUS at 09:05

## 2019-05-30 RX ADMIN — PHENYLEPHRINE HYDROCHLORIDE 100 MCG: 10 INJECTION INTRAVENOUS at 12:05

## 2019-05-30 RX ADMIN — ROCURONIUM BROMIDE 10 MG: 10 INJECTION, SOLUTION INTRAVENOUS at 04:05

## 2019-05-30 RX ADMIN — SODIUM CHLORIDE, SODIUM GLUCONATE, SODIUM ACETATE, POTASSIUM CHLORIDE, MAGNESIUM CHLORIDE, SODIUM PHOSPHATE, DIBASIC, AND POTASSIUM PHOSPHATE: .53; .5; .37; .037; .03; .012; .00082 INJECTION, SOLUTION INTRAVENOUS at 07:05

## 2019-05-30 RX ADMIN — Medication 10 MG: at 02:05

## 2019-05-30 RX ADMIN — LIDOCAINE HYDROCHLORIDE 50 MG: 20 INJECTION, SOLUTION INTRAVENOUS at 11:05

## 2019-05-30 RX ADMIN — ROCURONIUM BROMIDE 10 MG: 10 INJECTION, SOLUTION INTRAVENOUS at 05:05

## 2019-05-30 RX ADMIN — SODIUM CHLORIDE, SODIUM GLUCONATE, SODIUM ACETATE, POTASSIUM CHLORIDE, MAGNESIUM CHLORIDE, SODIUM PHOSPHATE, DIBASIC, AND POTASSIUM PHOSPHATE: .53; .5; .37; .037; .03; .012; .00082 INJECTION, SOLUTION INTRAVENOUS at 04:05

## 2019-05-30 RX ADMIN — MORPHINE SULFATE 1 MG: 2 INJECTION, SOLUTION INTRAMUSCULAR; INTRAVENOUS at 10:05

## 2019-05-30 RX ADMIN — FENTANYL CITRATE 50 MCG: 50 INJECTION, SOLUTION INTRAMUSCULAR; INTRAVENOUS at 08:05

## 2019-05-30 RX ADMIN — DEXMEDETOMIDINE HYDROCHLORIDE 4 MCG: 100 INJECTION, SOLUTION, CONCENTRATE INTRAVENOUS at 09:05

## 2019-05-30 RX ADMIN — ROCURONIUM BROMIDE 10 MG: 10 INJECTION, SOLUTION INTRAVENOUS at 07:05

## 2019-05-30 RX ADMIN — GLYCOPYRROLATE 0.2 MG: 0.2 INJECTION, SOLUTION INTRAMUSCULAR; INTRAVENOUS at 12:05

## 2019-05-30 RX ADMIN — PROPOFOL 160 MG: 10 INJECTION, EMULSION INTRAVENOUS at 11:05

## 2019-05-30 RX ADMIN — MIDAZOLAM HYDROCHLORIDE 2 MG: 1 INJECTION, SOLUTION INTRAMUSCULAR; INTRAVENOUS at 10:05

## 2019-05-30 RX ADMIN — ACETAMINOPHEN 1000 MG: 10 INJECTION, SOLUTION INTRAVENOUS at 02:05

## 2019-05-30 RX ADMIN — ROCURONIUM BROMIDE 50 MG: 10 INJECTION, SOLUTION INTRAVENOUS at 11:05

## 2019-05-30 RX ADMIN — FENTANYL CITRATE 100 MCG: 50 INJECTION, SOLUTION INTRAMUSCULAR; INTRAVENOUS at 12:05

## 2019-05-30 RX ADMIN — DEXMEDETOMIDINE HYDROCHLORIDE 4 MCG: 100 INJECTION, SOLUTION, CONCENTRATE INTRAVENOUS at 08:05

## 2019-05-30 RX ADMIN — SODIUM CHLORIDE, SODIUM GLUCONATE, SODIUM ACETATE, POTASSIUM CHLORIDE, MAGNESIUM CHLORIDE, SODIUM PHOSPHATE, DIBASIC, AND POTASSIUM PHOSPHATE: .53; .5; .37; .037; .03; .012; .00082 INJECTION, SOLUTION INTRAVENOUS at 02:05

## 2019-05-30 RX ADMIN — ONDANSETRON 4 MG: 2 INJECTION INTRAMUSCULAR; INTRAVENOUS at 08:05

## 2019-05-30 RX ADMIN — PHENYLEPHRINE HYDROCHLORIDE 100 MCG: 10 INJECTION INTRAVENOUS at 02:05

## 2019-05-30 RX ADMIN — PHENYLEPHRINE HYDROCHLORIDE 100 MCG: 10 INJECTION INTRAVENOUS at 08:05

## 2019-05-30 RX ADMIN — FENTANYL CITRATE 50 MCG: 50 INJECTION, SOLUTION INTRAMUSCULAR; INTRAVENOUS at 02:05

## 2019-05-30 RX ADMIN — FAMOTIDINE 20 MG: 10 INJECTION, SOLUTION INTRAVENOUS at 08:05

## 2019-05-30 RX ADMIN — ROCURONIUM BROMIDE 10 MG: 10 INJECTION, SOLUTION INTRAVENOUS at 03:05

## 2019-05-30 RX ADMIN — PHENYLEPHRINE HYDROCHLORIDE 150 MCG: 10 INJECTION INTRAVENOUS at 06:05

## 2019-05-30 RX ADMIN — PHENYLEPHRINE HYDROCHLORIDE 200 MCG: 10 INJECTION INTRAVENOUS at 03:05

## 2019-05-30 RX ADMIN — SODIUM CHLORIDE 0.25 MCG/KG/MIN: 9 INJECTION, SOLUTION INTRAVENOUS at 03:05

## 2019-05-30 RX ADMIN — PHENYLEPHRINE HYDROCHLORIDE 160 MCG: 10 INJECTION INTRAVENOUS at 09:05

## 2019-05-30 RX ADMIN — FENTANYL CITRATE 50 MCG: 50 INJECTION, SOLUTION INTRAMUSCULAR; INTRAVENOUS at 06:05

## 2019-05-30 RX ADMIN — LEVOFLOXACIN 750 MG: 750 INJECTION, SOLUTION INTRAVENOUS at 11:05

## 2019-05-30 RX ADMIN — SODIUM CHLORIDE: 0.9 INJECTION, SOLUTION INTRAVENOUS at 10:05

## 2019-05-30 RX ADMIN — PHENYLEPHRINE HYDROCHLORIDE 80 MCG: 10 INJECTION INTRAVENOUS at 08:05

## 2019-05-30 RX ADMIN — FENTANYL CITRATE 100 MCG: 50 INJECTION, SOLUTION INTRAMUSCULAR; INTRAVENOUS at 11:05

## 2019-05-30 RX ADMIN — SUGAMMADEX 200 MG: 100 INJECTION, SOLUTION INTRAVENOUS at 09:05

## 2019-05-30 RX ADMIN — Medication 10 MG: at 05:05

## 2019-05-30 NOTE — H&P
[facility]  General Surgery  History & Physical    Patient Name: Jennifer Andre  MRN: [id number]  Admission Date: 5/30/2019  Attending Physician: Wallace Santiago MD   Primary Care Provider: Marcello Benavidez MD    Patient information was obtained from patient and ER records.     Subjective:     Chief Complaint/Reason for Admission: Scheduled mandibulectomy    History of Present Illness:  Patient is a 64 y.o. female with oropharyngeal SCC s/p segmental mandibulectomy and chemo/RT admitted today for mandibulectomy with ENT. General Surgery asked to place PEG also.   She previously had a PEG in 2013.   H/o lap sarah and JACI    No current facility-administered medications on file prior to encounter.      Current Outpatient Medications on File Prior to Encounter   Medication Sig    clobetasol (TEMOVATE) 0.05 % cream Apply topically 2 (two) times daily as needed.       Review of patient's allergies indicates:   Allergen Reactions    Bactrim [sulfamethoxazole-trimethoprim]     Clindamycin     Keflex [cephalexin]     Lortab [hydrocodone-acetaminophen]     Tramadol      DIZZY AND NAUSEA, & VOMITTING       History reviewed. No pertinent past medical history.  History reviewed. No pertinent surgical history.  Family History     None        Tobacco Use    Smoking status: Former Smoker    Smokeless tobacco: Never Used   Substance and Sexual Activity    Alcohol use: Not on file    Drug use: Not on file    Sexual activity: Not on file     Review of Systems   Constitutional: Negative.    HENT: Negative.    Eyes: Negative.    Respiratory: Negative.    Cardiovascular: Negative.    Gastrointestinal: Negative.    Endocrine: Negative.    Genitourinary: Negative.    Musculoskeletal: Negative.    Neurological: Negative.    Hematological: Negative.    Psychiatric/Behavioral: Negative.      Objective:     Vital Signs (Most Recent):    Vital Signs (24h Range):           There is no height or weight on file to  calculate BMI.    Physical Exam   Constitutional: She is oriented to person, place, and time. She appears well-developed and well-nourished.   HENT:   Head: Normocephalic and atraumatic.   Eyes: EOM are normal.   Neck: Neck supple.   Cardiovascular: Normal rate and regular rhythm.   Pulmonary/Chest: Effort normal.   Abdominal: Soft. She exhibits no distension.   Lap scars well healed  Prior LUQ PEG site well healed   Musculoskeletal: Normal range of motion.   Neurological: She is alert and oriented to person, place, and time.   Skin: Skin is warm.   Psychiatric: She has a normal mood and affect. Her behavior is normal.   Nursing note and vitals reviewed.      Significant Labs:  CBC: No results for input(s): WBC, RBC, HGB, HCT, PLT, MCV, MCH, MCHC in the last 168 hours.  BMP: No results for input(s): GLU, NA, K, CL, CO2, BUN, CREATININE, CALCIUM, MG in the last 168 hours.    Significant Diagnostics:  I have reviewed all pertinent imaging results/findings within the past 24 hours.    Assessment/Plan:     - Will be available for PEG placement  - Consented    Paul Gallego MD  General Surgery  [facility]

## 2019-05-30 NOTE — OP NOTE
DATE OF PROCEDURE: 05/30/2019    PREOPERATIVE DIAGNOSES:   1. Oropharyngeal SCC   2. Dysphagia.    POSTOPERATIVE DIAGNOSES:   1. Oropharyngeal SCC  2. Dysphagia.     PROCEDURES PERFORMED:   1. Esophagogastroduodenoscopy  2. Percutaneous endoscopic gastrostomy.    ATTENDING SURGEON: Ottoniel Apple M.D.     HOUSESTAFF SURGEON: Shine Olivares MD    ANESTHESIA: Local plus monitored anesthesia care    ESTIMATED BLOOD LOSS: 2 mL     FINDINGS: 20-[place] percutaneous gastrostomy @ 3.5cm placed without apparent complication.     SPECIMEN: None.     DRAINS: None.     COMPLICATIONS: None.     INDICATIONS: Jennifer Andre is a 64 y.o.female who presented to [facility] for planned mandibulectomy. The patient is expected to have prolonged dysphagia as a result and General Surgery was consulted for placement of a percutaneous gastrostomy tube. We did obtain informed consent from the patient  who expressed understanding of the risks and benefits and gave consent to proceed.     OPERATIVE PROCEDURE: The patient was identified in preoperative holding and brought back to the operating room. Anesthesia was induced. A timeout procedure was performed and all team members present agreed this was the correct procedure on the correct patient.    We then directed our attention to the left upper quadrant for gastrostomy tube placement. The patient's abdomen was prepped and draped. An upper endoscope was introduced into the oropharynx and guided down into the esophagus and stomach. The stomach was insufflated with air. We intubated the duodenum and no abnormalities were noted. We withdrew the endoscope into the stomach and identified an appropriate position for gastrostomy tube placement 2 finger-breadths below the left subcostal margin. Palpation of the anterior abdominal wall at this point was visualized endoscopically and transillumination from the endoscope was visualized through the anterior abdominal wall. We made a 1.5 cm  transverse skin incision. At this point, the stomach was cannulated with a hollow bore needle. This was grasped by a snare which had been passed through the endoscope. The needle was removed, and a guidewire was placed, and the snare was used to grasp the guidewire. The endoscope, snare, and guidewire were all withdrawn from the patient's mouth. A 20-Citizen of [place] gastrostomy tube was loaded onto the guidewire and pulled through the anterior abdominal wall via Seldinger technique. Repeat endoscopy was performed with the gastrostomy tube at the 3.5 cm hua at the skin. There was no blanching of the gastric mucosa, and when the tube was twisted, the button did not grab the mucosa. The insufflation in the stomach was evacuated, and the endoscope was removed. The gastrostomy tube was secured in place using the supplied devices and connected to a bag for gravity drainage.    The patient tolerated the procedure without complication. Dr. Jimenez was present for and directed or performed the entire procedure. All sponge, instrument, and needle counts were correct at the termination of the PEG procedure.

## 2019-05-31 LAB
ALBUMIN SERPL BCP-MCNC: 2.7 G/DL (ref 3.5–5.2)
ALP SERPL-CCNC: 69 U/L (ref 55–135)
ALT SERPL W/O P-5'-P-CCNC: 11 U/L (ref 10–44)
ANION GAP SERPL CALC-SCNC: 11 MMOL/L (ref 8–16)
ANION GAP SERPL CALC-SCNC: 11 MMOL/L (ref 8–16)
AST SERPL-CCNC: 28 U/L (ref 10–40)
BASOPHILS # BLD AUTO: 0.05 K/UL (ref 0–0.2)
BASOPHILS # BLD AUTO: 0.05 K/UL (ref 0–0.2)
BASOPHILS NFR BLD: 0.4 % (ref 0–1.9)
BASOPHILS NFR BLD: 0.4 % (ref 0–1.9)
BILIRUB SERPL-MCNC: 0.3 MG/DL (ref 0.1–1)
BUN SERPL-MCNC: 11 MG/DL (ref 8–23)
BUN SERPL-MCNC: 11 MG/DL (ref 8–23)
CALCIUM SERPL-MCNC: 7.9 MG/DL (ref 8.7–10.5)
CHLORIDE SERPL-SCNC: 112 MMOL/L (ref 95–110)
CHLORIDE SERPL-SCNC: 112 MMOL/L (ref 95–110)
CO2 SERPL-SCNC: 16 MMOL/L (ref 23–29)
CO2 SERPL-SCNC: 16 MMOL/L (ref 23–29)
CREAT SERPL-MCNC: 0.7 MG/DL (ref 0.5–1.4)
CREAT SERPL-MCNC: 0.7 MG/DL (ref 0.5–1.4)
DIFFERENTIAL METHOD: ABNORMAL
DIFFERENTIAL METHOD: ABNORMAL
EOSINOPHIL # BLD AUTO: 0 K/UL (ref 0–0.5)
EOSINOPHIL # BLD AUTO: 0 K/UL (ref 0–0.5)
EOSINOPHIL NFR BLD: 0 % (ref 0–8)
EOSINOPHIL NFR BLD: 0 % (ref 0–8)
ERYTHROCYTE [DISTWIDTH] IN BLOOD BY AUTOMATED COUNT: 13.8 % (ref 11.5–14.5)
ERYTHROCYTE [DISTWIDTH] IN BLOOD BY AUTOMATED COUNT: 13.8 % (ref 11.5–14.5)
EST. GFR  (AFRICAN AMERICAN): >60 ML/MIN/1.73 M^2
EST. GFR  (AFRICAN AMERICAN): >60 ML/MIN/1.73 M^2
EST. GFR  (NON AFRICAN AMERICAN): >60 ML/MIN/1.73 M^2
EST. GFR  (NON AFRICAN AMERICAN): >60 ML/MIN/1.73 M^2
GLUCOSE SERPL-MCNC: 116 MG/DL (ref 70–110)
GLUCOSE SERPL-MCNC: 124 MG/DL (ref 70–110)
GLUCOSE SERPL-MCNC: 144 MG/DL (ref 70–110)
GLUCOSE SERPL-MCNC: 144 MG/DL (ref 70–110)
HCO3 UR-SCNC: 22.5 MMOL/L (ref 24–28)
HCO3 UR-SCNC: 22.7 MMOL/L (ref 24–28)
HCT VFR BLD AUTO: 30.9 % (ref 37–48.5)
HCT VFR BLD AUTO: 30.9 % (ref 37–48.5)
HCT VFR BLD CALC: 32 %PCV (ref 36–54)
HCT VFR BLD CALC: 33 %PCV (ref 36–54)
HGB BLD-MCNC: 10.1 G/DL (ref 12–16)
HGB BLD-MCNC: 10.1 G/DL (ref 12–16)
IMM GRANULOCYTES # BLD AUTO: 0.06 K/UL (ref 0–0.04)
IMM GRANULOCYTES # BLD AUTO: 0.06 K/UL (ref 0–0.04)
IMM GRANULOCYTES NFR BLD AUTO: 0.5 % (ref 0–0.5)
IMM GRANULOCYTES NFR BLD AUTO: 0.5 % (ref 0–0.5)
LYMPHOCYTES # BLD AUTO: 0.4 K/UL (ref 1–4.8)
LYMPHOCYTES # BLD AUTO: 0.4 K/UL (ref 1–4.8)
LYMPHOCYTES NFR BLD: 3.4 % (ref 18–48)
LYMPHOCYTES NFR BLD: 3.4 % (ref 18–48)
MAGNESIUM SERPL-MCNC: 1.8 MG/DL (ref 1.6–2.6)
MCH RBC QN AUTO: 29.4 PG (ref 27–31)
MCH RBC QN AUTO: 29.4 PG (ref 27–31)
MCHC RBC AUTO-ENTMCNC: 32.7 G/DL (ref 32–36)
MCHC RBC AUTO-ENTMCNC: 32.7 G/DL (ref 32–36)
MCV RBC AUTO: 90 FL (ref 82–98)
MCV RBC AUTO: 90 FL (ref 82–98)
MONOCYTES # BLD AUTO: 0.6 K/UL (ref 0.3–1)
MONOCYTES # BLD AUTO: 0.6 K/UL (ref 0.3–1)
MONOCYTES NFR BLD: 4.8 % (ref 4–15)
MONOCYTES NFR BLD: 4.8 % (ref 4–15)
NEUTROPHILS # BLD AUTO: 11.9 K/UL (ref 1.8–7.7)
NEUTROPHILS # BLD AUTO: 11.9 K/UL (ref 1.8–7.7)
NEUTROPHILS NFR BLD: 90.9 % (ref 38–73)
NEUTROPHILS NFR BLD: 90.9 % (ref 38–73)
NRBC BLD-RTO: 0 /100 WBC
NRBC BLD-RTO: 0 /100 WBC
PCO2 BLDA: 36.8 MMHG (ref 35–45)
PCO2 BLDA: 37.3 MMHG (ref 35–45)
PH SMN: 7.39 [PH] (ref 7.35–7.45)
PH SMN: 7.4 [PH] (ref 7.35–7.45)
PHOSPHATE SERPL-MCNC: 2.1 MG/DL (ref 2.7–4.5)
PLATELET # BLD AUTO: 158 K/UL (ref 150–350)
PLATELET # BLD AUTO: 158 K/UL (ref 150–350)
PMV BLD AUTO: 10.9 FL (ref 9.2–12.9)
PMV BLD AUTO: 10.9 FL (ref 9.2–12.9)
PO2 BLDA: 109 MMHG (ref 80–100)
PO2 BLDA: 205 MMHG (ref 80–100)
POC BE: -2 MMOL/L
POC BE: -3 MMOL/L
POC IONIZED CALCIUM: 1.04 MMOL/L (ref 1.06–1.42)
POC IONIZED CALCIUM: 1.08 MMOL/L (ref 1.06–1.42)
POC SATURATED O2: 100 % (ref 95–100)
POC SATURATED O2: 98 % (ref 95–100)
POC TCO2: 24 MMOL/L (ref 23–27)
POC TCO2: 24 MMOL/L (ref 23–27)
POTASSIUM BLD-SCNC: 3.5 MMOL/L (ref 3.5–5.1)
POTASSIUM BLD-SCNC: 4 MMOL/L (ref 3.5–5.1)
POTASSIUM SERPL-SCNC: 3.7 MMOL/L (ref 3.5–5.1)
POTASSIUM SERPL-SCNC: 3.7 MMOL/L (ref 3.5–5.1)
PROT SERPL-MCNC: 5.5 G/DL (ref 6–8.4)
RBC # BLD AUTO: 3.43 M/UL (ref 4–5.4)
RBC # BLD AUTO: 3.43 M/UL (ref 4–5.4)
SAMPLE: ABNORMAL
SAMPLE: ABNORMAL
SODIUM BLD-SCNC: 141 MMOL/L (ref 136–145)
SODIUM BLD-SCNC: 143 MMOL/L (ref 136–145)
SODIUM SERPL-SCNC: 139 MMOL/L (ref 136–145)
SODIUM SERPL-SCNC: 139 MMOL/L (ref 136–145)
WBC # BLD AUTO: 13.03 K/UL (ref 3.9–12.7)
WBC # BLD AUTO: 13.03 K/UL (ref 3.9–12.7)

## 2019-05-31 PROCEDURE — 25000003 PHARM REV CODE 250: Performed by: STUDENT IN AN ORGANIZED HEALTH CARE EDUCATION/TRAINING PROGRAM

## 2019-05-31 PROCEDURE — 99900026 HC AIRWAY MAINTENANCE (STAT)

## 2019-05-31 PROCEDURE — 63600175 PHARM REV CODE 636 W HCPCS

## 2019-05-31 PROCEDURE — 63600175 PHARM REV CODE 636 W HCPCS: Performed by: STUDENT IN AN ORGANIZED HEALTH CARE EDUCATION/TRAINING PROGRAM

## 2019-05-31 PROCEDURE — 85025 COMPLETE CBC W/AUTO DIFF WBC: CPT

## 2019-05-31 PROCEDURE — 20000000 HC ICU ROOM

## 2019-05-31 PROCEDURE — 31720 CLEARANCE OF AIRWAYS: CPT

## 2019-05-31 PROCEDURE — 94761 N-INVAS EAR/PLS OXIMETRY MLT: CPT

## 2019-05-31 PROCEDURE — 99900035 HC TECH TIME PER 15 MIN (STAT)

## 2019-05-31 PROCEDURE — 27000221 HC OXYGEN, UP TO 24 HOURS

## 2019-05-31 PROCEDURE — 80053 COMPREHEN METABOLIC PANEL: CPT

## 2019-05-31 PROCEDURE — 99291 PR CRITICAL CARE, E/M 30-74 MINUTES: ICD-10-PCS | Mod: GC,,, | Performed by: SURGERY

## 2019-05-31 PROCEDURE — 84100 ASSAY OF PHOSPHORUS: CPT

## 2019-05-31 PROCEDURE — 99291 CRITICAL CARE FIRST HOUR: CPT | Mod: GC,,, | Performed by: SURGERY

## 2019-05-31 PROCEDURE — 83735 ASSAY OF MAGNESIUM: CPT

## 2019-05-31 RX ORDER — MORPHINE SULFATE 2 MG/ML
4 INJECTION, SOLUTION INTRAMUSCULAR; INTRAVENOUS EVERY 4 HOURS PRN
Status: DISCONTINUED | OUTPATIENT
Start: 2019-05-31 | End: 2019-05-31

## 2019-05-31 RX ORDER — SODIUM CHLORIDE, SODIUM LACTATE, POTASSIUM CHLORIDE, CALCIUM CHLORIDE 600; 310; 30; 20 MG/100ML; MG/100ML; MG/100ML; MG/100ML
INJECTION, SOLUTION INTRAVENOUS CONTINUOUS
Status: DISCONTINUED | OUTPATIENT
Start: 2019-05-31 | End: 2019-06-02

## 2019-05-31 RX ORDER — MORPHINE SULFATE 2 MG/ML
INJECTION, SOLUTION INTRAMUSCULAR; INTRAVENOUS
Status: COMPLETED
Start: 2019-05-31 | End: 2019-05-31

## 2019-05-31 RX ORDER — PANTOPRAZOLE SODIUM 40 MG/1
40 TABLET, DELAYED RELEASE ORAL DAILY
Status: ON HOLD | COMMUNITY
End: 2019-06-10 | Stop reason: HOSPADM

## 2019-05-31 RX ORDER — HYDRALAZINE HYDROCHLORIDE 20 MG/ML
10 INJECTION INTRAMUSCULAR; INTRAVENOUS ONCE
Status: COMPLETED | OUTPATIENT
Start: 2019-05-31 | End: 2019-05-31

## 2019-05-31 RX ORDER — MORPHINE SULFATE 2 MG/ML
2 INJECTION, SOLUTION INTRAMUSCULAR; INTRAVENOUS
Status: DISCONTINUED | OUTPATIENT
Start: 2019-05-31 | End: 2019-06-01

## 2019-05-31 RX ORDER — MORPHINE SULFATE 2 MG/ML
2 INJECTION, SOLUTION INTRAMUSCULAR; INTRAVENOUS EVERY 4 HOURS PRN
Status: DISCONTINUED | OUTPATIENT
Start: 2019-05-31 | End: 2019-05-31

## 2019-05-31 RX ADMIN — SODIUM CHLORIDE, SODIUM LACTATE, POTASSIUM CHLORIDE, AND CALCIUM CHLORIDE: .6; .31; .03; .02 INJECTION, SOLUTION INTRAVENOUS at 11:05

## 2019-05-31 RX ADMIN — MORPHINE SULFATE 2 MG: 2 INJECTION, SOLUTION INTRAMUSCULAR; INTRAVENOUS at 05:05

## 2019-05-31 RX ADMIN — SODIUM CHLORIDE, SODIUM LACTATE, POTASSIUM CHLORIDE, AND CALCIUM CHLORIDE 250 ML: .6; .31; .03; .02 INJECTION, SOLUTION INTRAVENOUS at 03:05

## 2019-05-31 RX ADMIN — SODIUM CHLORIDE, SODIUM LACTATE, POTASSIUM CHLORIDE, AND CALCIUM CHLORIDE: .6; .31; .03; .02 INJECTION, SOLUTION INTRAVENOUS at 04:05

## 2019-05-31 RX ADMIN — AMPICILLIN SODIUM AND SULBACTAM SODIUM 1.5 G: 1; .5 INJECTION, POWDER, FOR SOLUTION INTRAMUSCULAR; INTRAVENOUS at 08:05

## 2019-05-31 RX ADMIN — AMPICILLIN SODIUM AND SULBACTAM SODIUM 1.5 G: 1; .5 INJECTION, POWDER, FOR SOLUTION INTRAMUSCULAR; INTRAVENOUS at 04:05

## 2019-05-31 RX ADMIN — AMPICILLIN SODIUM AND SULBACTAM SODIUM 1.5 G: 1; .5 INJECTION, POWDER, FOR SOLUTION INTRAMUSCULAR; INTRAVENOUS at 12:05

## 2019-05-31 RX ADMIN — SODIUM CHLORIDE 500 ML: 0.9 INJECTION, SOLUTION INTRAVENOUS at 10:05

## 2019-05-31 RX ADMIN — POTASSIUM CHLORIDE, DEXTROSE MONOHYDRATE AND SODIUM CHLORIDE: 150; 5; 900 INJECTION, SOLUTION INTRAVENOUS at 12:05

## 2019-05-31 RX ADMIN — MORPHINE SULFATE 1 MG: 2 INJECTION, SOLUTION INTRAMUSCULAR; INTRAVENOUS at 02:05

## 2019-05-31 RX ADMIN — MORPHINE SULFATE 2 MG: 2 INJECTION, SOLUTION INTRAMUSCULAR; INTRAVENOUS at 10:05

## 2019-05-31 RX ADMIN — MORPHINE SULFATE 1 MG: 2 INJECTION, SOLUTION INTRAMUSCULAR; INTRAVENOUS at 01:05

## 2019-05-31 RX ADMIN — MORPHINE SULFATE 1 MG: 2 INJECTION, SOLUTION INTRAMUSCULAR; INTRAVENOUS at 09:05

## 2019-05-31 RX ADMIN — HYDRALAZINE HYDROCHLORIDE 10 MG: 20 INJECTION INTRAMUSCULAR; INTRAVENOUS at 02:05

## 2019-05-31 RX ADMIN — SODIUM CHLORIDE, SODIUM LACTATE, POTASSIUM CHLORIDE, AND CALCIUM CHLORIDE: .6; .31; .03; .02 INJECTION, SOLUTION INTRAVENOUS at 10:05

## 2019-05-31 RX ADMIN — MORPHINE SULFATE 2 MG: 2 INJECTION, SOLUTION INTRAMUSCULAR; INTRAVENOUS at 08:05

## 2019-05-31 RX ADMIN — ENOXAPARIN SODIUM 40 MG: 100 INJECTION SUBCUTANEOUS at 12:05

## 2019-05-31 RX ADMIN — ENOXAPARIN SODIUM 40 MG: 100 INJECTION SUBCUTANEOUS at 05:05

## 2019-05-31 NOTE — NURSING
Patient admitted to SICU from OR. Patient connected to ICU monitor, MD at bedside to assess patient and discuss plan of care. Orders received and being carried out. Lab work collected and sent. Discussed with patient and family plan of care. Bed low and locked, Call light within reach, Patient's board updated. Will continue to monitor closely.

## 2019-05-31 NOTE — OP NOTE
DATE OF PROCEDURE:  05/30/2019    SURGEON:  Navdeep Dangelo M.D.    ASSISTANT SURGEON:  Reuben Ledesma M.D. (RES)    ANESTHESIA:  General endotracheal anesthesia.    PROCEDURES PERFORMED:  1.  Right fibula free flap with inset into the mouth.  2.  Mandibular reconstruction with transosteal plate.  3.  Right condylar reconstruction.  4.  Site preparation for flap inset.  5.  Tracheostomy.  6.  Neck exploration for vessels with anastomosis into the right facial artery   and right common facial vein.  7.  Split-thickness skin graft from the leg measuring 15 x 6 cm.  8.  AlloDerm with inset into the right glenoid fossa.    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  Please   see Dr. Santiago's note on the same day on the same patient for details of the   ablative procedure.    POSTOPERATIVE DIAGNOSES:  This is a 64-year-old woman with history of multiple   oral cancers, who presents with a metachronous primary right retromolar trigone   cancer requiring hemimandibulectomy.  Please see Dr. Santiago's note on the same   day on the same patient for details of the ablative procedure.    PROCEDURE IN DETAIL:  After obtaining informed consent, the patient was taken to   the Operating Room in the supine position.  General endotracheal anesthesia was   induced without difficulty.  The area was prepped and draped in the standard   sterile fashion.  The General Surgical team performed percutaneous gastrostomy   placement and a low anterior midline cervical incision was planned for the   tracheostomy.  Then, 1% lidocaine with 1:100,000 epinephrine was injected.    Sufficient time was allowed for this to take effect.  Skin was incised with a   #15 blade and carried down to subplatysmal plane.  Short subplatysmal flaps were   raised superiorly and  inferiorly.  The median raphe was bluntly divided in the   midline exposing the anterior surface of the trachea.  The cricoid hook was then   placed to elevate the larynx and a Eleno flap was created based on the second   tracheal ring.  This was secured with simple interrupted 3-0 Vicryl sutures.    Under direct visualization, the anesthesiologist removed the endotracheal tube   and a coiled wire 6.5 endotracheal tube was then placed intraluminally and   secured to the chest with 2-0 silk suture.  This was connected to the anesthesia   circuit where end tidal CO2 was confirmed.  The ablative procedure was then   performed by Dr. Wallace Santiago.  Please see his note on the same day to the   same patient.  The Doppler was then used to identify two distal perforators of   the right fibular flap.  Care was then taken to preserve 6 cm of bone both   proximally and distally in order to avoid destabilizing the ankle and the knee.    The tourniquet was then inflated after devascularizing the leg with an Esmarch.    The leg was then placed on a bump.  The skin was incised with a #10 blade and   carried down to a subfascial plane.  Dissection then proceeded back over the   lateral compartment muscles of the leg to the septum  the lateral from   the anterior compartment.  A single  was then identified in this   transseptal course supplying the skin paddle.  The muscles of the anterior   compartment were then released off the fibula, taking care to leave a carpet of   2 mm of muscle to avoid damaging the periosteal blood supply.  The interosseous   membrane was then identified.  The interosseous musculature was then released   bimanually and the interosseous membrane was incised with the tenotomy scissors.    Proximal and distal osteotomies were then made and the bone was then retracted   laterally.  The distal blood supply was then divided and ligated with Hemoclips   and 2-0 silk ties.  Dissection then  proceeded over the vascular bundle   supplying the flap, taking care to clip any perforating vessels.  The dissection   then proceeded proximally until the origin of the peroneal system was   identified.  There were two large vena comitantes and a single moderately sized   artery supplying the flap.  The back cut of the skin paddle was then made and   carried down to the same subfascial plane and dissected anteriorly.  The    was then dissected through a short intermuscular course in order to   preserve it.  The surrounding musculature was then released from the septum and   the remaining soleus and gastrocnemius attachments were then removed with the   Bovie electrocautery taking care not to injure the vascular supply.  The flap   was then harvested and brought up to the head for further sculpting.  A template   was made using sterile tongue depressors resulting in a two-segment   cris-mandibular reconstruction and condyle reconstruction.  The distal head of   the bone was then shaped with a pineapple neville into a kaley-condyle and a thin   AlloDerm was then affixed to act as a pseudo-disk.  The plate having been bent   was confirmed to be in a good position to allow the articulation of the new bony   segment with the glenoid fossa.  The osteotomies were then made corresponding   to the two segments as per the template and these were attached with   monocortical screws to the reconstruction plate.  A small amount of fine tuning   of the angles of the medial reconstruction plate was then performed with a   pineapple neville.  The construct was then inset and secured to the pre-drilled   holes in the anterior native mandible, thus reconstructing the condyle and   cris-mandible.  These were secured with bicortical screws.  The vessels were   then brought down into the neck and attention was then turned to the neck   exploration.  The sternohyoid and digastric muscles were then divided with the   Bovie  electrocautery and circumferential control of the internal jugular vein   was then gained with vessel loops.  A small stub of common facial vein was then   prepared for microvascular anastomosis and the facial artery was then brought   down initially in a significant spasm.  This was back cut with return of good   flow.  Microscope was then brought in and an end-to-end anastomosis was   performed to the common facial vein utilizing a 3.5 mm venous  to the   dominant vena comitantes.  The remaining vein was then clipped with a small   Hemoclip.  The end-to-end arterial anastomosis was then performed with simple   interrupted 9-0 nylon suture.  This resulted in immediate revascularization and   good visible capillary refill as well as Doppler signals on the skin paddle as   well as within the neck.  Attention was then turned to the site preparation   where devitalized tissue along the posterior pharynx was then removed and the   flap was then inset sewing in the skin paddle with horizontal mattress 3-0   Vicryl sutures.  A small amount of the distal paddle tip was then   deepithelialized and buried in the anterior buccal space.  The coiled wire tube   was then replaced with a 6.0 cuffed Shiley tracheostomy tube and secured to the   anterior neck skin with 2-0 silk sutures.  This was connected to the anesthesia   circuit and end tidal CO2 was confirmed.  Attention was then turned to closure   of the donor site where a 19-[place] round Chris drain was brought out through a   separate skin incision and secured with a 3-0 nylon suture.  The dermatome was   then used to harvest a skin graft from the right thigh measuring 15 x 6 cm at   18:1000 of an inch in thickness.  This was then inset and sewn circumferentially   into the donor site defect, taking care to cover all exposed paratenon with   simple interrupted 4-0 chromic gut sutures.  The remaining incision was then   closed in layers deeply, with simple  interrupted 3-0 Vicryl sutures used to   reapproximate the subdermal layers.  The skin edges were apposed with staples.    A bolster was then constructed with Xeroform wrapped over sterile gauze sponges   and this was secured with 2-0 silk sutures.  A Cam walker boot was then placed.    This concluded the procedure.  The patient was then rotated back to Anesthesia,   awakened in the Operating Room without difficulty.  There were no   complications, and estimated blood loss from my portion of the procedure is 50   mL.      ELISEO/DILCIA  dd: 05/30/2019 20:55:09 (CDT)  td: 05/30/2019 21:19:08 (CDT)  Doc ID   #[id number]  Job ID #[id number]    CC:

## 2019-05-31 NOTE — PROGRESS NOTES
[facility]  Critical Care - Surgery  Progress Note    Patient Name: Jennifer Andre  MRN: [id number]  Admission Date: 5/30/2019  Hospital Length of Stay: 1 days  Code Status: Full Code  Attending Provider: Wallace Santiago MD  Primary Care Provider: Marcello Benavidez MD   Principal Problem: Squamous cell carcinoma    Subjective:     Hospital/ICU Course:  No notes on file    Interval History/Significant Events: NAEO. Adequate UOP. Pain controlled. Flap checks normal.    Follow-up For: Procedure(s) (LRB):  MANDIBULECTOMY (Right)  DISSECTION, NECK (Right)  CREATION, FREE FLAP (Right)  EGD, WITH PEG TUBE INSERTION  TRACHEOTOMY (N/A)    Post-Operative Day: 1 Day Post-Op    Objective:     Vital Signs (Most Recent):  Temp: (!) 101.1 °F (38.4 °C) (05/31/19 0838)  Pulse: 71 (05/31/19 0845)  Resp: 13 (05/31/19 0845)  BP: (!) 105/54 (05/31/19 0800)  SpO2: (!) 93 % (05/31/19 0845) Vital Signs (24h Range):  Temp:  [98.4 °F (36.9 °C)-101.1 °F (38.4 °C)] 101.1 °F (38.4 °C)  Pulse:  [53-85] 71  Resp:  [10-25] 13  SpO2:  [91 %-100 %] 93 %  BP: ()/(50-60) 105/54  Arterial Line BP: ()/(35-88) 103/55     Weight: 81.2 kg (179 lb 0.2 oz)  Body mass index is 31.71 kg/m².      Intake/Output Summary (Last 24 hours) at 5/31/2019 0930  Last data filed at 5/31/2019 0800  Gross per 24 hour   Intake 3756 ml   Output 1880 ml   Net 1876 ml       Physical Exam  Constitutional: She is oriented to person, place, and time. She appears well-developed and well-nourished.   Eyes: Pupils are equal, round, and reactive to light. EOM are normal.   Neck:   Horizontal neck incision. Trach in place   Cardiovascular: Normal rate, regular rhythm and normal heart sounds.   Pulmonary/Chest: Effort normal and breath sounds normal.   Abdominal: Soft. Bowel sounds are normal.   Musculoskeletal: Normal range of motion.   Skin graft site at Right thigh. Right leg brace and bandage on Right leg.   Neurological: She is alert and oriented  to person, place, and time.   Skin: Skin is dry.   Psychiatric: She has a normal mood and affect. Her behavior is normal.     Vents:  Oxygen Concentration (%): 28 (05/31/19 0838)    Lines/Drains/Airways     Drain                 Closed/Suction Drain 05/30/19 2024 Right Leg Bulb 15 Fr. less than 1 day         Closed/Suction Drain 05/30/19 2024 Right Neck Bulb 15 Fr. less than 1 day         Gastrostomy/Enterostomy 05/30/19 1140 Percutaneous endoscopic gastrostomy (PEG) LUQ feeding less than 1 day         Urethral Catheter 05/30/19 1120 Non-latex;Straight-tip;Temperature probe 16 Fr. less than 1 day          Airway                 Surgical Airway 05/30/19 1120 Shiley Cuffed less than 1 day          Arterial Line                 Arterial Line 05/30/19 Right Radial 1 day          Peripheral Intravenous Line                 Peripheral IV - Single Lumen 05/30/19 0847 22 G Right Wrist 1 day         Peripheral IV - Single Lumen 05/30/19 18 G Left Hand 1 day         Peripheral IV - Single Lumen 05/30/19 18 G Right Hand 1 day         Peripheral IV - Single Lumen 05/31/19 0400 22 G Left Wrist less than 1 day                Significant Labs:    CBC/Anemia Profile:  Recent Labs   Lab 05/30/19 2217 05/31/19  0400   WBC 13.44* 13.03*  13.03*   HGB 10.6* 10.1*  10.1*   HCT 33.2* 30.9*  30.9*    158  158   MCV 93 90  90   RDW 13.6 13.8  13.8        Chemistries:  Recent Labs   Lab 05/30/19 2217 05/31/19  0400    139  139   K 4.4 3.7  3.7    112*  112*   CO2 20* 16*  16*   BUN 11 11  11   CREATININE 0.7 0.7  0.7   CALCIUM 7.3* 7.9*  7.9*  7.9*   ALBUMIN 2.6* 2.7*   PROT 5.2* 5.5*   BILITOT 0.3 0.3   ALKPHOS 72 69   ALT 8* 11   AST 17 28   MG 1.7 1.8   PHOS 3.7 2.1*           Significant Imaging:  I have reviewed all pertinent imaging results/findings within the past 24 hours.    Assessment/Plan:     * Squamous cell carcinoma  64 y.o. female w/ a significant PMHx of oropharyngeal SCC s/p segmental  mandibulectomy + chemo/RT who is now s/p Right fibula free flap w/ inset into mouth + mandibular reconstruction + right condylar reconstruction , trach, PEG.           Neuro:   - AAOx4  - pain control with PRN morphine IV     Pulmonary:   - trach collar  - monitor respiratory status     Cardiac:   - HDS  - monitor     Renal:    - f/u BUN/Cr  - monitor UOP     ID:   - afebrile  - f/u WBC     Hem/Onc:   - f/u CBC  - monitor H/H  - transfuse PRN     Endocrine:    - monitor BG     Fluids/Electrolytes/Nutrition/GI:   - NPO  - Replace electrolytes PRN  - hyperchloremic - change mIVF to LR  - mIVF: LR @ 50cc/hr  - consider starting TF tomorrow (impact peptide)        DISPO:  - continue SICU care  - flap checks  - OOB/PT/OT            Critical care was time spent personally by me on the following activities: development of treatment plan with patient or surrogate and bedside caregivers, discussions with consultants, evaluation of patient's response to treatment, examination of patient, ordering and performing treatments and interventions, ordering and review of laboratory studies, ordering and review of radiographic studies, pulse oximetry, re-evaluation of patient's condition.  This critical care time did not overlap with that of any other provider or involve time for any procedures.     Marko Reed MD  Critical Care - Surgery  [facility]

## 2019-05-31 NOTE — PROGRESS NOTES
[facility]  General Surgery  Progress Note    Subjective:     History of Present Illness:  No notes on file    Post-Op Info:  Procedure(s) (LRB):  MANDIBULECTOMY (Right)  DISSECTION, NECK (Right)  CREATION, FREE FLAP (Right)  EGD, WITH PEG TUBE INSERTION  TRACHEOTOMY (N/A)   1 Day Post-Op     Interval History: No acute events overnight   On trach collar  300cc out of PEG    Medications:  Continuous Infusions:   dextrose 5 % and 0.9 % NaCl with KCl 20 mEq 100 mL/hr at 05/31/19 0600     Scheduled Meds:   ampicillin-sulbactim (UNASYN) IVPB  1.5 g Intravenous Q8H    enoxaparin  40 mg Subcutaneous Daily     PRN Meds:morphine     Review of patient's allergies indicates:   Allergen Reactions    Bactrim [sulfamethoxazole-trimethoprim]     Clindamycin     Keflex [cephalexin]     Lortab [hydrocodone-acetaminophen]     Tramadol      DIZZY AND NAUSEA, & VOMITTING     Objective:     Vital Signs (Most Recent):  Temp: (!) 101.1 °F (38.4 °C) (05/31/19 0838)  Pulse: 74 (05/31/19 0838)  Resp: (!) 9 (05/31/19 0838)  BP: (!) 102/52 (05/31/19 0600)  SpO2: (!) 94 % (05/31/19 0838) Vital Signs (24h Range):  Temp:  [98.4 °F (36.9 °C)-101.1 °F (38.4 °C)] 101.1 °F (38.4 °C)  Pulse:  [53-85] 74  Resp:  [9-25] 9  SpO2:  [91 %-100 %] 94 %  BP: ()/(50-60) 102/52  Arterial Line BP: ()/(35-88) 109/56     Weight: 81.2 kg (179 lb 0.2 oz)  Body mass index is 31.71 kg/m².    Intake/Output - Last 3 Shifts       05/29 0700 - 05/30 0659 05/30 0700 - 05/31 0659 05/31 0700 - 06/01 0659    I.V. (mL/kg)  3756 (46.3)     Total Intake(mL/kg)  3756 (46.3)     Urine (mL/kg/hr)  1250     Drains  360     Blood  100     Total Output  1710     Net  +2046                  Physical Exam   Constitutional: She is oriented to person, place, and time. She appears well-developed.   HENT:   Head: Atraumatic.   Eyes: Pupils are equal, round, and reactive to light. EOM are normal.   Neck: Neck supple.   Trach in place   Cardiovascular:  Normal rate and regular rhythm.   Pulmonary/Chest: Effort normal.   Abdominal: Soft.   PEG in place   Neurological: She is alert and oriented to person, place, and time.   Skin: Skin is warm and dry.   Nursing note and vitals reviewed.      Significant Labs:  CBC:   Recent Labs   Lab 05/31/19  0400   WBC 13.03*  13.03*   RBC 3.43*  3.43*   HGB 10.1*  10.1*   HCT 30.9*  30.9*     158   MCV 90  90   MCH 29.4  29.4   MCHC 32.7  32.7     BMP:   Recent Labs   Lab 05/31/19  0400   *  144*     139   K 3.7  3.7   *  112*   CO2 16*  16*   BUN 11  11   CREATININE 0.7  0.7   CALCIUM 7.9*  7.9*  7.9*   MG 1.8     CMP:   Recent Labs   Lab 05/31/19 0400   *  144*   CALCIUM 7.9*  7.9*  7.9*   ALBUMIN 2.7*   PROT 5.5*     139   K 3.7  3.7   CO2 16*  16*   *  112*   BUN 11  11   CREATININE 0.7  0.7   ALKPHOS 69   ALT 11   AST 28   BILITOT 0.3       Significant Diagnostics:  I have reviewed all pertinent imaging results/findings within the past 24 hours.    Assessment/Plan:     * Squamous cell carcinoma  64 year old female with oropharyngeal SCC s/p mandibulectomy with ENT and PEG by General Surgery     - Ok to use PEG for meds. Can start feeds this afternoon   - Please call with questions or concerns with tube        Paul Gallego MD  General Surgery  [facility]

## 2019-05-31 NOTE — PLAN OF CARE
Problem: Adult Inpatient Plan of Care  Goal: Plan of Care Review  Recommendations  Recommendation/Intervention:   1. Recommend initiating TF of Impact Peptide 1.5 at a goal rate of 55 mL/hr - to provide 1980 kcal/day, 124g protein/day, and 1016mL free fluid/day.   2. Per consult, patient to remain NPO x 1 week. PO diet per SLP and MD.   RD to monitor.    Goals: Patient to receive nutrition by RD follow-up  Nutrition Goal Status: new    Full assessment completed, see RD Note 5/31/2019.

## 2019-05-31 NOTE — PLAN OF CARE
Patient is a 64 year old female admitted from home 5/30/2019 and underwent Right Mandibulectomy, Right Neck Dissection, Free Flap, Tracheostomy, PEG tube Placement.  Patient is expected to discharge next week with home health and tube feeds.     Patient sleeping when attempted to visit.  Information obtained from patient's daughter.  Patient lives in a one story home with her , Jose Alberto ([phone]), who will drive her home and obtain anything she needs after discharge along with her daughter.  [facility] given left at patient's bedside.   name and number written on white board in patient's room with request to call for any questions and concerns.  Will continue to follow for needs.    PCP  Marcello Benavidez MD  [address] [facility] [employer] [facility] [place] [id number] [phone]    No Pharmacies Listed    Extended Emergency Contact Information  Primary Emergency Contact: Jeannie Apodaca  Mobile Phone: [phone]  Relation: Daughter  Preferred language: English   needed? No       05/31/19 2866   Discharge Assessment   Assessment Type Discharge Planning Assessment   Confirmed/corrected address and phone number on facesheet? Yes   Assessment information obtained from? Other  (Daughter)   Expected Length of Stay (days) 10   Prior to hospitilization cognitive status: Alert/Oriented   Prior to hospitalization functional status: Independent   Current cognitive status: Alert/Oriented   Current Functional Status: Independent;Assistive Equipment;Needs Assistance   Lives With spouse   Able to Return to Prior Arrangements yes   Is patient able to care for self after discharge? Yes   Who are your caregiver(s) and their phone number(s)? family   Patient's perception of discharge disposition home health   Equipment Currently Used at Home none   Do you have any problems affording any of your prescribed medications? No   Is the patient taking medications as prescribed? yes   Does the patient have  transportation home? Yes   Transportation Anticipated family or friend will provide   Discharge Plan A Home with family;Home Health   Discharge Plan B Home with family   DME Needed Upon Discharge  feeding device;respiratory supplies

## 2019-05-31 NOTE — ASSESSMENT & PLAN NOTE
64 year old female with oropharyngeal SCC s/p mandibulectomy with ENT and PEG by General Surgery     - Ok to use PEG for meds. Can start feeds this afternoon   - Please call with questions or concerns with tube

## 2019-05-31 NOTE — PLAN OF CARE
Problem: Adult Inpatient Plan of Care  Goal: Plan of Care Review  Outcome: Ongoing (interventions implemented as appropriate)  Pt O2 sats % on trach collar @ 5L and 28%. HR 50-70s NSR, MAP goal >65 met throughout the shift. Following commands, and moving all extremities well. Generalized weakness noted. Gtts: LR @ 100ml/hr. UOP marginal, 250cc LR bolus given this afternoon for UOP <30cc/hr for multiple hours in a row, Will continue to monitor. Pain controlled w/ PRN morphine. RENETTA drains to bulb suction, PEG tube to gravity bag. Flap checks to inside of R mouth/jaw complete Q1hr as well as assessment of R lower extremity/toes sensation and warmth assessed every hour with no abnormal findings. Repositioned Q2 to prevent skin breakdown. Right neck WIN w/ staples, no drainage. Plan of care reviewed with pt/family, all questions and concerns addressed. MD updated on current condition, vitals, labs, and gtts.  No new orders received, will continue to monitor.

## 2019-05-31 NOTE — ASSESSMENT & PLAN NOTE
64 y.o. 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           Neuro:   - AAOx4  - pain control with PRN morphine IV     Pulmonary:   - trach collar  - monitor respiratory status     Cardiac:   - HDS  - monitor     Renal:    - f/u BUN/Cr  - monitor UOP     ID:   - afebrile  - f/u WBC     Hem/Onc:   - f/u CBC  - monitor H/H  - transfuse PRN     Endocrine:    - monitor BG     Fluids/Electrolytes/Nutrition/GI:   - NPO  - Replace electrolytes PRN  - hyperchloremic - change mIVF to LR  - mIVF: LR @ 50cc/hr  - consider starting TF tomorrow (impact peptide)        DISPO:  - continue SICU care  - flap checks  - OOB/PT/OT

## 2019-05-31 NOTE — PROGRESS NOTES
POD 0 6-hour flap check note    Patient has been stable in ICU, no acute events  Awake, alert, follows commands  Right intraoral flap paddle is soft, color approximates thigh  Strong A/V signals at marking stitch with probe oriented laterally  Neck soft with staples in place, RENETTA drain holding suction  Right leg with boot in place, sensation and movement intact

## 2019-05-31 NOTE — PROGRESS NOTES
[facility]  Otorhinolaryngology-Head & Neck Surgery  Progress Note    Subjective:     Post-Op Info:  Procedure(s) (LRB):  MANDIBULECTOMY (Right)  DISSECTION, NECK (Right)  CREATION, FREE FLAP (Right)  EGD, WITH PEG TUBE INSERTION  TRACHEOTOMY (N/A)   1 Day Post-Op  Hospital Day: 2     Interval History:  No acute events since surgery.  Flap checks have been stable.  This AM she complains of thigh pain.     Medications:  Continuous Infusions:   lactated ringers 50 mL/hr at 05/31/19 1100     Scheduled Meds:   ampicillin-sulbactim (UNASYN) IVPB  1.5 g Intravenous Q8H    enoxaparin  40 mg Subcutaneous Daily     PRN Meds:morphine     Review of patient's allergies indicates:   Allergen Reactions    Bactrim [sulfamethoxazole-trimethoprim]     Clindamycin     Keflex [cephalexin]      Tolerated Unasyn 05/31/2019    Lortab [hydrocodone-acetaminophen]     Tramadol      DIZZY AND NAUSEA, & VOMITTING     Objective:     Vital Signs (24h Range):  Temp:  [98.4 °F (36.9 °C)-101.1 °F (38.4 °C)] 101.1 °F (38.4 °C)  Pulse:  [53-85] 59  Resp:  [10-25] 20  SpO2:  [91 %-100 %] 96 %  BP: ()/(50-60) 97/50  Arterial Line BP: ()/(35-88) 113/55     Date 05/31/19 0700 - 06/01/19 0659   Shift 3312-1745 8617-1371 5682-5022 24 Hour Total   INTAKE   I.V.(mL/kg) 426(5.2)   426(5.2)   Shift Total(mL/kg) 426(5.2)   426(5.2)   OUTPUT   Urine(mL/kg/hr) 145   145   Drains 90   90   Shift Total(mL/kg) 235(2.9)   235(2.9)   Weight (kg) 81.2 81.2 81.2 81.2     Lines/Drains/Airways     Drain                 Gastrostomy/Enterostomy 05/30/19 1140 Percutaneous endoscopic gastrostomy (PEG) LUQ feeding 1 day         Urethral Catheter 05/30/19 1120 Non-latex;Straight-tip;Temperature probe 16 Fr. 1 day         Closed/Suction Drain 05/30/19 2024 Right Leg Bulb 15 Fr. less than 1 day         Closed/Suction Drain 05/30/19 2024 Right Neck Bulb 15 Fr. less than 1 day          Airway                 Surgical Airway 05/30/19 1120  Subha Cuffed 1 day          Arterial Line                 Arterial Line 05/30/19 Right Radial 1 day          Peripheral Intravenous Line                 Peripheral IV - Single Lumen 05/30/19 0847 22 G Right Wrist 1 day         Peripheral IV - Single Lumen 05/30/19 18 G Left Hand 1 day         Peripheral IV - Single Lumen 05/30/19 18 G Right Hand 1 day         Peripheral IV - Single Lumen 05/31/19 0400 22 G Left Wrist less than 1 day              Physical Exam   Awake, appropriate affect, follows commands  Normocephalic  Oral cavity with fibula paddle in place to R buccal mucosa/retromolar trigone.  Strong arterial and venous signal, color approximates leg skin.    Tongue mobile  Neck incision with staple line intact  RENETTA drain with sanguinous output - 55cc charted since surgery  Tracheostomy in place, 6 DCT, cuff deflated this AM  R leg with boot in place, RENETTA with sanguinous output, 5cc  Arterial line in place  Pierson in place    Significant Labs:  BMP:   Recent Labs   Lab 05/31/19  0400   *  144*   *  112*   CO2 16*  16*   BUN 11  11   CREATININE 0.7  0.7   CALCIUM 7.9*  7.9*  7.9*   MG 1.8     CBC:   Recent Labs   Lab 05/31/19  0400   WBC 13.03*  13.03*   RBC 3.43*  3.43*   HGB 10.1*  10.1*   HCT 30.9*  30.9*     158   MCV 90  90   MCH 29.4  29.4   MCHC 32.7  32.7       Significant Diagnostics:  I have reviewed all pertinent imaging results/findings within the past 24 hours.    Assessment/Plan:     Malignant neoplasm of oral cavity  POD 1 - Composite resection right oral cavity and right mandible, right modified radical neck dissection, right fibular free flap reconstruction, split thickness skin graft from right thigh, tracheostomy, PEG placement.         Discontinue pierson catheter  Discontinue Art. Line  OOB to chair  Trickle tube feeds once G-tube cleared by general surgery  Continue Q1H flap checks and RLE neuro checks  PT/OT/Nutrition consulted  Continue ICU  status        Reuben Ledesma MD  Otorhinolaryngology-Head & Neck Surgery  [facility]

## 2019-05-31 NOTE — PLAN OF CARE
Problem: Adult Inpatient Plan of Care  Goal: Plan of Care Review  Outcome: Ongoing (interventions implemented as appropriate)  Patient free of falls/traumas/injuries. Plan of care discussed with patient and family.  Neuro: AAOx4, follows commands.  Cardiac: SR-SB on telemetry with HR in 50-60s, SBP <160 and Maps >65.  Respiratory: Trach collar with SpO2 100%  GI/: Andersen in place with UOP >30ml/hr  Flap to right lower mouth pink and moist with strong venous and arterial pulses.   MIVF infusing.

## 2019-05-31 NOTE — BRIEF OP NOTE
[facility]  Brief Operative Note    SUMMARY     Surgery Date: 5/30/2019     Surgeon(s) and Role:  Panel 1:     * Wallace Santiago MD - Primary     * Reuben Ledesma MD - Resident - Assisting  Panel 2:     * Navdeep Dangelo MD - Primary  Panel 3:     * Ottoniel Jimenez MD - Primary     * Shine Olivares MD - Resident - Assisting        Pre-op Diagnosis:  Malignant neoplasm of oral cavity [C06.9]    Post-op Diagnosis:  Post-Op Diagnosis Codes:     * Malignant neoplasm of oral cavity [C06.9]    Procedure(s) (LRB):  MANDIBULECTOMY (Right)  DISSECTION, NECK (Right)  CREATION, FREE FLAP (Right)  EGD, WITH PEG TUBE INSERTION  TRACHEOTOMY (N/A)    Anesthesia: General    Description of Procedure: See op note for details. Briefly, PEG placement, tracheostomy, right neck dissection, composite resection of right oral cavity squamous cell carcinoma with segmental mandibulectomy, reconstruction with fibular free flap, skin graft from right thigh    Estimated Blood Loss: 100 mL         Specimens:   Specimen (12h ago, onward)    Start     Ordered    05/30/19 2137  Specimen to Pathology - Surgery  Once     Comments:  1. Right neck dissection level1 B- permanent2. Right neck disscection level 2- permanent3. Right neck disscection level 3-permanent4. Right neck dissection level 4- permanent 5. Right inferior alveolar nerve- frozen6. Composite rection right mandble with buccal mucosa and floor of mouth . singe clip superior,Double clip anterior- permanent7. Medial mucosal margian-frozen8. Posterior mucosal margin-frozen9. Superior mucosal margin-smznlo22. Anterior muscosal margin-frozen     Start Status     05/30/19 2137 Collected (05/30/19 2137) Order ID: [id number]       05/30/19 2137

## 2019-05-31 NOTE — ASSESSMENT & PLAN NOTE
POD 1 - Composite resection right oral cavity and right mandible, right modified radical neck dissection, right fibular free flap reconstruction, split thickness skin graft from right thigh, tracheostomy, PEG placement.         Discontinue pierson catheter  Discontinue Art. Line  OOB to chair  Trickle tube feeds once G-tube cleared by general surgery  Continue Q1H flap checks and RLE neuro checks  PT/OT/Nutrition consulted  Continue ICU status

## 2019-05-31 NOTE — SUBJECTIVE & OBJECTIVE
Interval History: No acute events overnight   On trach collar  300cc out of PEG    Medications:  Continuous Infusions:   dextrose 5 % and 0.9 % NaCl with KCl 20 mEq 100 mL/hr at 05/31/19 0600     Scheduled Meds:   ampicillin-sulbactim (UNASYN) IVPB  1.5 g Intravenous Q8H    enoxaparin  40 mg Subcutaneous Daily     PRN Meds:morphine     Review of patient's allergies indicates:   Allergen Reactions    Bactrim [sulfamethoxazole-trimethoprim]     Clindamycin     Keflex [cephalexin]     Lortab [hydrocodone-acetaminophen]     Tramadol      DIZZY AND NAUSEA, & VOMITTING     Objective:     Vital Signs (Most Recent):  Temp: (!) 101.1 °F (38.4 °C) (05/31/19 0838)  Pulse: 74 (05/31/19 0838)  Resp: (!) 9 (05/31/19 0838)  BP: (!) 102/52 (05/31/19 0600)  SpO2: (!) 94 % (05/31/19 0838) Vital Signs (24h Range):  Temp:  [98.4 °F (36.9 °C)-101.1 °F (38.4 °C)] 101.1 °F (38.4 °C)  Pulse:  [53-85] 74  Resp:  [9-25] 9  SpO2:  [91 %-100 %] 94 %  BP: ()/(50-60) 102/52  Arterial Line BP: ()/(35-88) 109/56     Weight: 81.2 kg (179 lb 0.2 oz)  Body mass index is 31.71 kg/m².    Intake/Output - Last 3 Shifts       05/29 0700 - 05/30 0659 05/30 0700 - 05/31 0659 05/31 0700 - 06/01 0659    I.V. (mL/kg)  3756 (46.3)     Total Intake(mL/kg)  3756 (46.3)     Urine (mL/kg/hr)  1250     Drains  360     Blood  100     Total Output  1710     Net  +2046                  Physical Exam   Constitutional: She is oriented to person, place, and time. She appears well-developed.   HENT:   Head: Atraumatic.   Eyes: Pupils are equal, round, and reactive to light. EOM are normal.   Neck: Neck supple.   Trach in place   Cardiovascular: Normal rate and regular rhythm.   Pulmonary/Chest: Effort normal.   Abdominal: Soft.   PEG in place   Neurological: She is alert and oriented to person, place, and time.   Skin: Skin is warm and dry.   Nursing note and vitals reviewed.      Significant Labs:  CBC:   Recent Labs   Lab 05/31/19  0400   WBC 13.03*   13.03*   RBC 3.43*  3.43*   HGB 10.1*  10.1*   HCT 30.9*  30.9*     158   MCV 90  90   MCH 29.4  29.4   MCHC 32.7  32.7     BMP:   Recent Labs   Lab 05/31/19  0400   *  144*     139   K 3.7  3.7   *  112*   CO2 16*  16*   BUN 11  11   CREATININE 0.7  0.7   CALCIUM 7.9*  7.9*  7.9*   MG 1.8     CMP:   Recent Labs   Lab 05/31/19  0400   *  144*   CALCIUM 7.9*  7.9*  7.9*   ALBUMIN 2.7*   PROT 5.5*     139   K 3.7  3.7   CO2 16*  16*   *  112*   BUN 11  11   CREATININE 0.7  0.7   ALKPHOS 69   ALT 11   AST 28   BILITOT 0.3       Significant Diagnostics:  I have reviewed all pertinent imaging results/findings within the past 24 hours.

## 2019-05-31 NOTE — OR NURSING
SPECIMEN TO PATHOLOGYSPECIMEN TO PATHOLOGY  1. Right neck dissection level1 B- permanent  2. Right neck disscection level 2- permanent  3. Right neck disscection level 3-permanent  4. Right neck dissection level 4- permanent   5. Right inferior alveolar nerve- frozen  6. Composite rection right mandble with buccal mucosa and floor of mouth . singe clip superior,Double clip anterior- permanent  7. Medial mucosal margian-frozen  8. Posterior mucosal margin-frozen  9. Superior mucosal margin-frozen  10. Anterior muscosal margin-frozen

## 2019-05-31 NOTE — PROGRESS NOTES
Patient received with a #6 shiley cuffed inflated to 30 cm H20. Physician aware of inflated cuff. Trach sutured in place and currently on 5L 28% trach collar. Sat 100%, will continue to monitor.

## 2019-05-31 NOTE — H&P
[facility]  Critical Care - Surgery  History & Physical      Code Status: No Order     SURGERY/PROCEDURE: Procedure(s) (LRB):  MANDIBULECTOMY (Right)  DISSECTION, NECK (Right)  CREATION, FREE FLAP (Right)  EGD, WITH PEG TUBE INSERTION  TRACHEOTOMY (N/A)    CC: <principal problem not specified>    SUBJECTIVE:     History of Present Illness:  Patient is a 64 y.o. 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    PTA Medications   Medication Sig    clobetasol (TEMOVATE) 0.05 % cream Apply topically 2 (two) times daily as needed.       Continuous Infusions:     Scheduled Meds:     PRN Meds: levoFLOXacin, sodium chloride 0.9%    Review of patient's allergies indicates:   Allergen Reactions    Bactrim [sulfamethoxazole-trimethoprim]     Clindamycin     Keflex [cephalexin]     Lortab [hydrocodone-acetaminophen]     Tramadol      DIZZY AND NAUSEA, & VOMITTING       Past Medical History:   Diagnosis Date    Cancer      Past Surgical History:   Procedure Laterality Date    APPENDECTOMY      CHOLECYSTECTOMY      HYSTERECTOMY      MANDIBLE SURGERY       History reviewed. No pertinent family history.  Social History     Tobacco Use    Smoking status: Former Smoker    Smokeless tobacco: Never Used   Substance Use Topics    Alcohol use: Not on file    Drug use: Not on file          Review of Systems:  Review of Systems   Constitutional: Negative for chills and fever.   Respiratory: Negative for cough and shortness of breath.    Cardiovascular: Negative for chest pain and palpitations.   Gastrointestinal: Negative for nausea and vomiting.   Genitourinary: Negative for dysuria and hematuria.   Musculoskeletal: Negative for myalgias.   Neurological: Negative for dizziness and headaches.   Psychiatric/Behavioral:  Negative for depression and suicidal ideas.         OBJECTIVE:     Vital Signs (Most Recent)  Temp: 98.9 °F (37.2 °C) (05/30/19 2200)  Pulse: (!) 59 (05/30/19 2200)  Resp: 17 (05/30/19 2200)  BP: (!) 105/54 (05/30/19 2200)  SpO2: 100 % (05/30/19 2200)    Ventilator Data (Last 24H):     Oxygen Concentration (%):  [28] 28    Hemodynamic Parameters (Last 24H):       Physical Exam:  Physical Exam   Constitutional: She is oriented to person, place, and time. She appears well-developed and well-nourished.   Eyes: Pupils are equal, round, and reactive to light. EOM are normal.   Neck:   Horizontal neck incision. Trach in place   Cardiovascular: Normal rate, regular rhythm and normal heart sounds.   Pulmonary/Chest: Effort normal and breath sounds normal.   Abdominal: Soft. Bowel sounds are normal.   Musculoskeletal: Normal range of motion.   Skin graft site at Right thigh. Right leg brace and bandage on Right leg.   Neurological: She is alert and oriented to person, place, and time.   Skin: Skin is dry.   Psychiatric: She has a normal mood and affect. Her behavior is normal.         Lines/Drains:       Peripheral IV - Single Lumen 05/30/19 0847 22 G Right Wrist (Active)   Site Assessment Clean;Dry;Intact;No redness;No swelling 5/30/2019  8:47 AM   Line Status No blood return 5/30/2019  8:47 AM   Dressing Status Dry;Intact;Clean 5/30/2019  8:47 AM   Dressing Intervention New dressing 5/30/2019  8:47 AM   Number of days: 0            Peripheral IV - Single Lumen 05/30/19 18 G Right Hand (Active)   Number of days: 0            Peripheral IV - Single Lumen 05/30/19 18 G Left Hand (Active)   Number of days: 0            Arterial Line 05/30/19 Right Radial (Active)   Number of days: 0            Closed/Suction Drain 05/30/19 2024 Right Neck Bulb 15 Fr. (Active)   Number of days: 0            Closed/Suction Drain 05/30/19 2024 Right Leg Bulb 15 Fr. (Active)   Number of days: 0            Gastrostomy/Enterostomy 05/30/19 1140  Percutaneous endoscopic gastrostomy (PEG) LUQ feeding (Active)   Number of days: 0            Urethral Catheter 05/30/19 1120 Non-latex;Straight-tip;Temperature probe 16 Fr. (Active)   Output (mL) 75 mL 5/30/2019 10:00 PM   Number of days: 0       Laboratory  ABG: No results for input(s): PH, PO2, PCO2, HCO3, POCSATURATED, BE in the last 24 hours.  BMP:No results for input(s): NA, K, CL, CO2, BUN, CREATININE, GLU, MG, PHOS in the last 24 hours.  LFT: No results found for: AST, ALT, GGT, ALKPHOS, BILITOT, ALBUMIN, PROT  CBC: No results found for: WBC, HGB, HCT, MCV, PLT  Microbiology last 7d:   Microbiology Results (last 7 days)     ** No results found for the last 168 hours. **          Imaging/Diagnostic Results:    ASSESSMENT/PLAN:   64 y.o. 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        Neuro:   - AAOx4  - pain control with PRN morphine IV    Pulmonary:   - trach collar  - monitor respiratory status    Cardiac:   - HDS  - monitor    Renal:    - f/u BUN/Cr  - monitor UOP    ID:   - afebrile  - f/u WBC    Hem/Onc:   - f/u CBC  - monitor H/H  - transfuse PRN    Endocrine:    - monitor BG    Fluids/Electrolytes/Nutrition/GI:   - NPO  - Replace electrolytes PRN        DISPO:  - continue SICU care        Critical care was time spent personally by me on the following activities: development of treatment plan with patient or surrogate and bedside caregivers, discussions with consultants, evaluation of patient's response to treatment, examination of patient, ordering and performing treatments and interventions, ordering and review of laboratory studies, ordering and review of radiographic studies, pulse oximetry, re-evaluation of patient's condition. This critical care time did not overlap with that of any other provider or involve time for  any procedures.    Marko Reed MD  PGY2/CA1  Department of Anesthesiology  Pager: [phone]

## 2019-05-31 NOTE — CONSULTS
"  [facility]  Adult Nutrition  Consult Note    SUMMARY     Recommendations  Recommendation/Intervention:   1. Recommend initiating TF of Impact Peptide 1.5 at a goal rate of 55 mL/hr - to provide 1980 kcal/day, 124g protein/day, and 1016mL free fluid/day.   2. Per consult, patient to remain NPO x 1 week. PO diet per SLP and MD. WOODALL to monitor.    Goals: Patient to receive nutrition by RD follow-up  Nutrition Goal Status: new  Communication of RD Recs: (POC)    Reason for Assessment  Reason For Assessment: consult  Diagnosis: cancer diagnosis/related complications, surgery/postoperative complications(oropharyngeal SCC s/p surgery 5/30)  Relevant Medical History: s/p chemo/radiation  Interdisciplinary Rounds: attended  General Information Comments: POD#1 s/p R mandibulectomy, R neck dissection, free flap, trach, & PEG. On trach collar this morning. Asleep at time of vist x2. Unable to perform full NFPE, but noted patient with mild muscle wasting. Unable to clearly determine if patient meets criteria for malnutrition at this time.  Nutrition Discharge Planning: Unable to determine at this time.    Nutrition Risk Screen  Nutrition Risk Screen: no indicators present    Nutrition/Diet History  Spiritual, Cultural Beliefs, Bahai Practices, Values that Affect Care: no  Factors Affecting Nutritional Intake: NPO, difficulty/impaired swallowing    Anthropometrics  Temp: (!) 101.1 °F (38.4 °C)  Height Method: Stated  Height: 5' 3" (160 cm)  Height (inches): 63 in  Weight Method: Bed Scale  Weight: 81.2 kg (179 lb 0.2 oz)  Weight (lb): 179.02 lb  Ideal Body Weight (IBW), Female: 115 lb  % Ideal Body Weight, Female (lb): 150.68 lb  BMI (Calculated): 30.8  BMI Grade: 30 - 34.9- obesity - grade I    Lab/Procedures/Meds  Pertinent Labs Reviewed: reviewed  Pertinent Labs Comments: Cl 112, Glu 144, Ca 7.9, Alb 2.7  Pertinent Medications Reviewed: reviewed  Pertinent Medications Comments: " IVF    Estimated/Assessed Needs  Weight Used For Calorie Calculations: 81.2 kg (179 lb 0.2 oz)  Energy Calorie Requirements (kcal): 2030 kcal/day  Energy Need Method: Kcal/kg(25)  Protein Requirements:  g/day(1.2-1.4 g/kg)  Weight Used For Protein Calculations: 81.2 kg (179 lb 0.2 oz)  Fluid Requirements (mL): 1 mL/kcal or per MD  Estimated Fluid Requirement Method: RDA Method  RDA Method (mL): 2030    Nutrition Prescription Ordered  Current Diet Order: NPO    Evaluation of Received Nutrient/Fluid Intake  I/O: +2L x 24hrs  Comments: LBM 5/30  % Intake of Estimated Energy Needs: 0 - 25 %  % Meal Intake: NPO    Nutrition Risk  Level of Risk/Frequency of Follow-up: high(2x/week)     Assessment and Plan  Nutrition Problem  Inadequate energy intake    Related to (etiology):   Decreased ability to consume sufficient energy    Signs and Symptoms (as evidenced by):   NPO with no alternative means of nutrition at this time    Interventions/Recommendations (treatment strategy):  Collaboration and referral of nutrition care    Nutrition Diagnosis Status:   New    Monitor and Evaluation  Food and Nutrient Intake: energy intake, enteral nutrition intake  Food and Nutrient Adminstration: enteral and parenteral nutrition administration  Anthropometric Measurements: weight, weight change  Biochemical Data, Medical Tests and Procedures: electrolyte and renal panel, gastrointestinal profile, inflammatory profile  Nutrition-Focused Physical Findings: overall appearance     Nutrition Follow-Up  RD Follow-up?: Yes

## 2019-05-31 NOTE — TRANSFER OF CARE
"Anesthesia Transfer of Care Note    Patient: Jennifer Andre    Procedure(s) Performed: Procedure(s) (LRB):  MANDIBULECTOMY (Right)  DISSECTION, NECK (Right)  CREATION, FREE FLAP (Right)  EGD, WITH PEG TUBE INSERTION  TRACHEOTOMY (N/A)    Patient location: ICU    Anesthesia Type: general    Transport from OR: Upon arrival to PACU/ICU, patient attached to ventilator and auscultated to confirm bilateral breath sounds and adequate TV. Continuous ECG monitoring in transport. Continuous SpO2 monitoring in transport. Continuos invasive BP monitoring in transport. Upon arrival to PACU/ICU, patient attached to 100% O2 by T-piece with adequate spontaneous ventilation    Post pain: adequate analgesia    Post assessment: no apparent anesthetic complications and tolerated procedure well    Post vital signs: stable    Level of consciousness: sedated and responds to stimulation    Nausea/Vomiting: no nausea/vomiting    Complications: none    Transfer of care protocol was followed      Last vitals:   Visit Vitals  BP (!) 105/54 (BP Location: Right arm, Patient Position: Lying)   Pulse (!) 59   Temp 37.2 °C (98.9 °F) (Oral)   Resp 17   Ht 5' 3" (1.6 m)   Wt 74.8 kg (165 lb)   SpO2 100%   Breastfeeding? No   BMI 29.23 kg/m²     "

## 2019-05-31 NOTE — SUBJECTIVE & OBJECTIVE
Interval History/Significant Events: NAEO. Adequate UOP. Pain controlled. Flap checks normal.    Follow-up For: Procedure(s) (LRB):  MANDIBULECTOMY (Right)  DISSECTION, NECK (Right)  CREATION, FREE FLAP (Right)  EGD, WITH PEG TUBE INSERTION  TRACHEOTOMY (N/A)    Post-Operative Day: 1 Day Post-Op    Objective:     Vital Signs (Most Recent):  Temp: (!) 101.1 °F (38.4 °C) (05/31/19 0838)  Pulse: 71 (05/31/19 0845)  Resp: 13 (05/31/19 0845)  BP: (!) 105/54 (05/31/19 0800)  SpO2: (!) 93 % (05/31/19 0845) Vital Signs (24h Range):  Temp:  [98.4 °F (36.9 °C)-101.1 °F (38.4 °C)] 101.1 °F (38.4 °C)  Pulse:  [53-85] 71  Resp:  [10-25] 13  SpO2:  [91 %-100 %] 93 %  BP: ()/(50-60) 105/54  Arterial Line BP: ()/(35-88) 103/55     Weight: 81.2 kg (179 lb 0.2 oz)  Body mass index is 31.71 kg/m².      Intake/Output Summary (Last 24 hours) at 5/31/2019 0930  Last data filed at 5/31/2019 0800  Gross per 24 hour   Intake 3756 ml   Output 1880 ml   Net 1876 ml       Physical Exam  Constitutional: She is oriented to person, place, and time. She appears well-developed and well-nourished.   Eyes: Pupils are equal, round, and reactive to light. EOM are normal.   Neck:   Horizontal neck incision. Trach in place   Cardiovascular: Normal rate, regular rhythm and normal heart sounds.   Pulmonary/Chest: Effort normal and breath sounds normal.   Abdominal: Soft. Bowel sounds are normal.   Musculoskeletal: Normal range of motion.   Skin graft site at Right thigh. Right leg brace and bandage on Right leg.   Neurological: She is alert and oriented to person, place, and time.   Skin: Skin is dry.   Psychiatric: She has a normal mood and affect. Her behavior is normal.     Vents:  Oxygen Concentration (%): 28 (05/31/19 0838)    Lines/Drains/Airways     Drain                 Closed/Suction Drain 05/30/19 2024 Right Leg Bulb 15 Fr. less than 1 day         Closed/Suction Drain 05/30/19 2024 Right Neck Bulb 15 Fr. less than 1 day          Gastrostomy/Enterostomy 05/30/19 1140 Percutaneous endoscopic gastrostomy (PEG) LUQ feeding less than 1 day         Urethral Catheter 05/30/19 1120 Non-latex;Straight-tip;Temperature probe 16 Fr. less than 1 day          Airway                 Surgical Airway 05/30/19 1120 Shiley Cuffed less than 1 day          Arterial Line                 Arterial Line 05/30/19 Right Radial 1 day          Peripheral Intravenous Line                 Peripheral IV - Single Lumen 05/30/19 0847 22 G Right Wrist 1 day         Peripheral IV - Single Lumen 05/30/19 18 G Left Hand 1 day         Peripheral IV - Single Lumen 05/30/19 18 G Right Hand 1 day         Peripheral IV - Single Lumen 05/31/19 0400 22 G Left Wrist less than 1 day                Significant Labs:    CBC/Anemia Profile:  Recent Labs   Lab 05/30/19 2217 05/31/19  0400   WBC 13.44* 13.03*  13.03*   HGB 10.6* 10.1*  10.1*   HCT 33.2* 30.9*  30.9*    158  158   MCV 93 90  90   RDW 13.6 13.8  13.8        Chemistries:  Recent Labs   Lab 05/30/19 2217 05/31/19  0400    139  139   K 4.4 3.7  3.7    112*  112*   CO2 20* 16*  16*   BUN 11 11  11   CREATININE 0.7 0.7  0.7   CALCIUM 7.3* 7.9*  7.9*  7.9*   ALBUMIN 2.6* 2.7*   PROT 5.2* 5.5*   BILITOT 0.3 0.3   ALKPHOS 72 69   ALT 8* 11   AST 17 28   MG 1.7 1.8   PHOS 3.7 2.1*           Significant Imaging:  I have reviewed all pertinent imaging results/findings within the past 24 hours.

## 2019-05-31 NOTE — SUBJECTIVE & OBJECTIVE
Interval History:  No acute events since surgery.  Flap checks have been stable.  This AM she complains of thigh pain.     Medications:  Continuous Infusions:   lactated ringers 50 mL/hr at 05/31/19 1100     Scheduled Meds:   ampicillin-sulbactim (UNASYN) IVPB  1.5 g Intravenous Q8H    enoxaparin  40 mg Subcutaneous Daily     PRN Meds:morphine     Review of patient's allergies indicates:   Allergen Reactions    Bactrim [sulfamethoxazole-trimethoprim]     Clindamycin     Keflex [cephalexin]      Tolerated Unasyn 05/31/2019    Lortab [hydrocodone-acetaminophen]     Tramadol      DIZZY AND NAUSEA, & VOMITTING     Objective:     Vital Signs (24h Range):  Temp:  [98.4 °F (36.9 °C)-101.1 °F (38.4 °C)] 101.1 °F (38.4 °C)  Pulse:  [53-85] 59  Resp:  [10-25] 20  SpO2:  [91 %-100 %] 96 %  BP: ()/(50-60) 97/50  Arterial Line BP: ()/(35-88) 113/55     Date 05/31/19 0700 - 06/01/19 0659   Shift 6205-3494 7238-9917 9955-2176 24 Hour Total   INTAKE   I.V.(mL/kg) 426(5.2)   426(5.2)   Shift Total(mL/kg) 426(5.2)   426(5.2)   OUTPUT   Urine(mL/kg/hr) 145   145   Drains 90   90   Shift Total(mL/kg) 235(2.9)   235(2.9)   Weight (kg) 81.2 81.2 81.2 81.2     Lines/Drains/Airways     Drain                 Gastrostomy/Enterostomy 05/30/19 1140 Percutaneous endoscopic gastrostomy (PEG) LUQ feeding 1 day         Urethral Catheter 05/30/19 1120 Non-latex;Straight-tip;Temperature probe 16 Fr. 1 day         Closed/Suction Drain 05/30/19 2024 Right Leg Bulb 15 Fr. less than 1 day         Closed/Suction Drain 05/30/19 2024 Right Neck Bulb 15 Fr. less than 1 day          Airway                 Surgical Airway 05/30/19 1120 Shiley Cuffed 1 day          Arterial Line                 Arterial Line 05/30/19 Right Radial 1 day          Peripheral Intravenous Line                 Peripheral IV - Single Lumen 05/30/19 0847 22 G Right Wrist 1 day         Peripheral IV - Single Lumen 05/30/19 18 G Left Hand 1 day         Peripheral IV  - Single Lumen 05/30/19 18 G Right Hand 1 day         Peripheral IV - Single Lumen 05/31/19 0400 22 G Left Wrist less than 1 day              Physical Exam   Awake, appropriate affect, follows commands  Normocephalic  Oral cavity with fibula paddle in place to R buccal mucosa/retromolar trigone.  Strong arterial and venous signal, color approximates leg skin.    Tongue mobile  Neck incision with staple line intact  RENETTA drain with sanguinous output - 55cc charted since surgery  Tracheostomy in place, 6 DCT, cuff deflated this AM  R leg with boot in place, RENETTA with sanguinous output, 5cc  Arterial line in place  Andersen in place    Significant Labs:  BMP:   Recent Labs   Lab 05/31/19  0400   *  144*   *  112*   CO2 16*  16*   BUN 11  11   CREATININE 0.7  0.7   CALCIUM 7.9*  7.9*  7.9*   MG 1.8     CBC:   Recent Labs   Lab 05/31/19  0400   WBC 13.03*  13.03*   RBC 3.43*  3.43*   HGB 10.1*  10.1*   HCT 30.9*  30.9*     158   MCV 90  90   MCH 29.4  29.4   MCHC 32.7  32.7       Significant Diagnostics:  I have reviewed all pertinent imaging results/findings within the past 24 hours.

## 2019-06-01 LAB
ALBUMIN SERPL BCP-MCNC: 2.2 G/DL (ref 3.5–5.2)
ALP SERPL-CCNC: 53 U/L (ref 55–135)
ALT SERPL W/O P-5'-P-CCNC: 12 U/L (ref 10–44)
ANION GAP SERPL CALC-SCNC: 6 MMOL/L (ref 8–16)
ANION GAP SERPL CALC-SCNC: 6 MMOL/L (ref 8–16)
AST SERPL-CCNC: 35 U/L (ref 10–40)
BASOPHILS # BLD AUTO: 0.04 K/UL (ref 0–0.2)
BASOPHILS # BLD AUTO: 0.04 K/UL (ref 0–0.2)
BASOPHILS NFR BLD: 0.4 % (ref 0–1.9)
BASOPHILS NFR BLD: 0.4 % (ref 0–1.9)
BILIRUB SERPL-MCNC: 0.3 MG/DL (ref 0.1–1)
BUN SERPL-MCNC: 10 MG/DL (ref 8–23)
BUN SERPL-MCNC: 10 MG/DL (ref 8–23)
CALCIUM SERPL-MCNC: 7.5 MG/DL (ref 8.7–10.5)
CHLORIDE SERPL-SCNC: 113 MMOL/L (ref 95–110)
CHLORIDE SERPL-SCNC: 113 MMOL/L (ref 95–110)
CO2 SERPL-SCNC: 21 MMOL/L (ref 23–29)
CO2 SERPL-SCNC: 21 MMOL/L (ref 23–29)
CREAT SERPL-MCNC: 0.7 MG/DL (ref 0.5–1.4)
CREAT SERPL-MCNC: 0.7 MG/DL (ref 0.5–1.4)
DIFFERENTIAL METHOD: ABNORMAL
DIFFERENTIAL METHOD: ABNORMAL
EOSINOPHIL # BLD AUTO: 0 K/UL (ref 0–0.5)
EOSINOPHIL # BLD AUTO: 0 K/UL (ref 0–0.5)
EOSINOPHIL NFR BLD: 0 % (ref 0–8)
EOSINOPHIL NFR BLD: 0 % (ref 0–8)
ERYTHROCYTE [DISTWIDTH] IN BLOOD BY AUTOMATED COUNT: 14 % (ref 11.5–14.5)
ERYTHROCYTE [DISTWIDTH] IN BLOOD BY AUTOMATED COUNT: 14 % (ref 11.5–14.5)
EST. GFR  (AFRICAN AMERICAN): >60 ML/MIN/1.73 M^2
EST. GFR  (AFRICAN AMERICAN): >60 ML/MIN/1.73 M^2
EST. GFR  (NON AFRICAN AMERICAN): >60 ML/MIN/1.73 M^2
EST. GFR  (NON AFRICAN AMERICAN): >60 ML/MIN/1.73 M^2
GLUCOSE SERPL-MCNC: 87 MG/DL (ref 70–110)
GLUCOSE SERPL-MCNC: 87 MG/DL (ref 70–110)
HCT VFR BLD AUTO: 26.3 % (ref 37–48.5)
HCT VFR BLD AUTO: 26.3 % (ref 37–48.5)
HGB BLD-MCNC: 8.5 G/DL (ref 12–16)
HGB BLD-MCNC: 8.5 G/DL (ref 12–16)
IMM GRANULOCYTES # BLD AUTO: 0.03 K/UL (ref 0–0.04)
IMM GRANULOCYTES # BLD AUTO: 0.03 K/UL (ref 0–0.04)
IMM GRANULOCYTES NFR BLD AUTO: 0.3 % (ref 0–0.5)
IMM GRANULOCYTES NFR BLD AUTO: 0.3 % (ref 0–0.5)
LYMPHOCYTES # BLD AUTO: 0.7 K/UL (ref 1–4.8)
LYMPHOCYTES # BLD AUTO: 0.7 K/UL (ref 1–4.8)
LYMPHOCYTES NFR BLD: 7.3 % (ref 18–48)
LYMPHOCYTES NFR BLD: 7.3 % (ref 18–48)
MAGNESIUM SERPL-MCNC: 1.8 MG/DL (ref 1.6–2.6)
MAGNESIUM SERPL-MCNC: 1.9 MG/DL (ref 1.6–2.6)
MCH RBC QN AUTO: 29.9 PG (ref 27–31)
MCH RBC QN AUTO: 29.9 PG (ref 27–31)
MCHC RBC AUTO-ENTMCNC: 32.3 G/DL (ref 32–36)
MCHC RBC AUTO-ENTMCNC: 32.3 G/DL (ref 32–36)
MCV RBC AUTO: 93 FL (ref 82–98)
MCV RBC AUTO: 93 FL (ref 82–98)
MONOCYTES # BLD AUTO: 0.7 K/UL (ref 0.3–1)
MONOCYTES # BLD AUTO: 0.7 K/UL (ref 0.3–1)
MONOCYTES NFR BLD: 8 % (ref 4–15)
MONOCYTES NFR BLD: 8 % (ref 4–15)
NEUTROPHILS # BLD AUTO: 7.7 K/UL (ref 1.8–7.7)
NEUTROPHILS # BLD AUTO: 7.7 K/UL (ref 1.8–7.7)
NEUTROPHILS NFR BLD: 84 % (ref 38–73)
NEUTROPHILS NFR BLD: 84 % (ref 38–73)
NRBC BLD-RTO: 0 /100 WBC
NRBC BLD-RTO: 0 /100 WBC
PHOSPHATE SERPL-MCNC: 1.9 MG/DL (ref 2.7–4.5)
PHOSPHATE SERPL-MCNC: 3.2 MG/DL (ref 2.7–4.5)
PLATELET # BLD AUTO: 116 K/UL (ref 150–350)
PLATELET # BLD AUTO: 116 K/UL (ref 150–350)
PMV BLD AUTO: 12 FL (ref 9.2–12.9)
PMV BLD AUTO: 12 FL (ref 9.2–12.9)
POCT GLUCOSE: 104 MG/DL (ref 70–110)
POCT GLUCOSE: 99 MG/DL (ref 70–110)
POTASSIUM SERPL-SCNC: 3.5 MMOL/L (ref 3.5–5.1)
POTASSIUM SERPL-SCNC: 3.5 MMOL/L (ref 3.5–5.1)
PROT SERPL-MCNC: 4.9 G/DL (ref 6–8.4)
RBC # BLD AUTO: 2.84 M/UL (ref 4–5.4)
RBC # BLD AUTO: 2.84 M/UL (ref 4–5.4)
SODIUM SERPL-SCNC: 140 MMOL/L (ref 136–145)
SODIUM SERPL-SCNC: 140 MMOL/L (ref 136–145)
WBC # BLD AUTO: 9.17 K/UL (ref 3.9–12.7)
WBC # BLD AUTO: 9.17 K/UL (ref 3.9–12.7)

## 2019-06-01 PROCEDURE — 84100 ASSAY OF PHOSPHORUS: CPT

## 2019-06-01 PROCEDURE — 25000003 PHARM REV CODE 250: Performed by: STUDENT IN AN ORGANIZED HEALTH CARE EDUCATION/TRAINING PROGRAM

## 2019-06-01 PROCEDURE — 63600175 PHARM REV CODE 636 W HCPCS: Performed by: STUDENT IN AN ORGANIZED HEALTH CARE EDUCATION/TRAINING PROGRAM

## 2019-06-01 PROCEDURE — 94770 HC EXHALED C02 TEST: CPT

## 2019-06-01 PROCEDURE — 20000000 HC ICU ROOM

## 2019-06-01 PROCEDURE — 83735 ASSAY OF MAGNESIUM: CPT

## 2019-06-01 PROCEDURE — 99233 SBSQ HOSP IP/OBS HIGH 50: CPT | Mod: GC,,, | Performed by: SURGERY

## 2019-06-01 PROCEDURE — 97161 PT EVAL LOW COMPLEX 20 MIN: CPT

## 2019-06-01 PROCEDURE — 80053 COMPREHEN METABOLIC PANEL: CPT

## 2019-06-01 PROCEDURE — 99233 PR SUBSEQUENT HOSPITAL CARE,LEVL III: ICD-10-PCS | Mod: GC,,, | Performed by: SURGERY

## 2019-06-01 PROCEDURE — 99900035 HC TECH TIME PER 15 MIN (STAT)

## 2019-06-01 PROCEDURE — 94761 N-INVAS EAR/PLS OXIMETRY MLT: CPT

## 2019-06-01 PROCEDURE — 83735 ASSAY OF MAGNESIUM: CPT | Mod: 91

## 2019-06-01 PROCEDURE — 97166 OT EVAL MOD COMPLEX 45 MIN: CPT

## 2019-06-01 PROCEDURE — 99900026 HC AIRWAY MAINTENANCE (STAT)

## 2019-06-01 PROCEDURE — 84100 ASSAY OF PHOSPHORUS: CPT | Mod: 91

## 2019-06-01 PROCEDURE — 27000221 HC OXYGEN, UP TO 24 HOURS

## 2019-06-01 PROCEDURE — 85025 COMPLETE CBC W/AUTO DIFF WBC: CPT

## 2019-06-01 RX ORDER — POTASSIUM CHLORIDE 20 MEQ/15ML
40 SOLUTION ORAL ONCE
Status: COMPLETED | OUTPATIENT
Start: 2019-06-01 | End: 2019-06-01

## 2019-06-01 RX ORDER — ACETAMINOPHEN 325 MG/1
650 TABLET ORAL EVERY 6 HOURS
Status: DISCONTINUED | OUTPATIENT
Start: 2019-06-01 | End: 2019-06-06

## 2019-06-01 RX ORDER — NALOXONE HCL 0.4 MG/ML
0.02 VIAL (ML) INJECTION
Status: DISCONTINUED | OUTPATIENT
Start: 2019-06-01 | End: 2019-06-03

## 2019-06-01 RX ORDER — MORPHINE SULFATE 1 MG/ML
INJECTION INTRAVENOUS CONTINUOUS
Status: DISCONTINUED | OUTPATIENT
Start: 2019-06-01 | End: 2019-06-03

## 2019-06-01 RX ADMIN — SODIUM CHLORIDE, SODIUM LACTATE, POTASSIUM CHLORIDE, AND CALCIUM CHLORIDE: .6; .31; .03; .02 INJECTION, SOLUTION INTRAVENOUS at 08:06

## 2019-06-01 RX ADMIN — MAGNESIUM SULFATE HEPTAHYDRATE 1 G: 500 INJECTION, SOLUTION INTRAMUSCULAR; INTRAVENOUS at 08:06

## 2019-06-01 RX ADMIN — MAGNESIUM SULFATE HEPTAHYDRATE 1 G: 500 INJECTION, SOLUTION INTRAMUSCULAR; INTRAVENOUS at 04:06

## 2019-06-01 RX ADMIN — AMPICILLIN SODIUM AND SULBACTAM SODIUM 1.5 G: 1; .5 INJECTION, POWDER, FOR SOLUTION INTRAMUSCULAR; INTRAVENOUS at 11:06

## 2019-06-01 RX ADMIN — ACETAMINOPHEN 650 MG: 325 TABLET ORAL at 05:06

## 2019-06-01 RX ADMIN — AMPICILLIN SODIUM AND SULBACTAM SODIUM 1.5 G: 1; .5 INJECTION, POWDER, FOR SOLUTION INTRAMUSCULAR; INTRAVENOUS at 08:06

## 2019-06-01 RX ADMIN — POTASSIUM PHOSPHATE, MONOBASIC AND POTASSIUM PHOSPHATE, DIBASIC 30 MMOL: 224; 236 INJECTION, SOLUTION, CONCENTRATE INTRAVENOUS at 10:06

## 2019-06-01 RX ADMIN — MORPHINE SULFATE 2 MG: 2 INJECTION, SOLUTION INTRAMUSCULAR; INTRAVENOUS at 01:06

## 2019-06-01 RX ADMIN — ACETAMINOPHEN 650 MG: 325 TABLET ORAL at 11:06

## 2019-06-01 RX ADMIN — AMPICILLIN SODIUM AND SULBACTAM SODIUM 1.5 G: 1; .5 INJECTION, POWDER, FOR SOLUTION INTRAMUSCULAR; INTRAVENOUS at 04:06

## 2019-06-01 RX ADMIN — MORPHINE SULFATE 2 MG: 2 INJECTION, SOLUTION INTRAMUSCULAR; INTRAVENOUS at 07:06

## 2019-06-01 RX ADMIN — Medication: at 12:06

## 2019-06-01 RX ADMIN — MORPHINE SULFATE 2 MG: 2 INJECTION, SOLUTION INTRAMUSCULAR; INTRAVENOUS at 05:06

## 2019-06-01 RX ADMIN — ENOXAPARIN SODIUM 40 MG: 100 INJECTION SUBCUTANEOUS at 04:06

## 2019-06-01 RX ADMIN — MORPHINE SULFATE 2 MG: 2 INJECTION, SOLUTION INTRAMUSCULAR; INTRAVENOUS at 03:06

## 2019-06-01 RX ADMIN — POTASSIUM CHLORIDE 40 MEQ: 20 SOLUTION ORAL at 04:06

## 2019-06-01 RX ADMIN — AMPICILLIN SODIUM AND SULBACTAM SODIUM 1.5 G: 1; .5 INJECTION, POWDER, FOR SOLUTION INTRAMUSCULAR; INTRAVENOUS at 01:06

## 2019-06-01 RX ADMIN — MORPHINE SULFATE 2 MG: 2 INJECTION, SOLUTION INTRAMUSCULAR; INTRAVENOUS at 10:06

## 2019-06-01 NOTE — NURSING
Notified MD about Pts UOP 20ml for 2100 and 10ml for 2200. Per MD Freddy to give 500 bolus NS over 2 hours. Will carry out orders and continue to monitor.

## 2019-06-01 NOTE — PT/OT/SLP EVAL
Physical Therapy Evaluation    Patient Name:  Jennifer Andre   MRN:  [id number]    Recommendations:     Discharge Recommendations:  nursing facility, skilled   Discharge Equipment Recommendations: (TBD pending progress with mobility)   Barriers to discharge: None    Assessment:     Jennifer Andre is a 64 y.o. female admitted with a medical diagnosis of Squamous cell carcinoma.  She presents with the following impairments/functional limitations:  weakness, impaired endurance, impaired functional mobilty, impaired self care skills, impaired balance, pain, decreased lower extremity function, decreased ROM.    -PT eval complete. Pt tolerated session well today but was limited in activity tolerance 2/2 fatigue and pain in (R) LE. Pt required (A) for bed mobility tasks but was able to sit at EOB without (A) after set-up. Pt performed self-care tasks at balance tasks at EOB with no posterior LOB. Pt will cont to benefit from therapy services and is appropriate for SNF placement at this time to maximize functional outcomes.    Rehab Prognosis: Good; patient would benefit from acute skilled PT services to address these deficits and reach maximum level of function.    Recent Surgery: Procedure(s) (LRB):  MANDIBULECTOMY (Right)  DISSECTION, NECK (Right)  CREATION, FREE FLAP (Right)  EGD, WITH PEG TUBE INSERTION  TRACHEOTOMY (N/A) 2 Days Post-Op    Plan:     During this hospitalization, patient to be seen 4 x/week to address the identified rehab impairments via gait training, therapeutic activities, therapeutic exercises and progress toward the following goals:    · Plan of Care Expires:  07/01/19    Subjective     Chief Complaint: impaired mobility  Patient/Family Comments/goals: return home to OF  Pain/Comfort:  · Pain Rating 1: other (see comments)(did not quantify)  · Location - Side 1: Right  · Location - Orientation 1: proximal  · Location 1: leg  · Pain Addressed 1: Reposition, Pre-medicate for activity, Nurse  notified    Patients cultural, spiritual, Congregational conflicts given the current situation: no    Living Environment:  -Pt lives with her  in a SSH with 2 ARTUR and no HR support. Pt has a walk-in shower with no seat.   Prior to admission, patients level of function was (I).  Equipment used at home: none.  DME owned (not currently used): .  Upon discharge, patient will have assistance from .    Objective:     Communicated with nsg prior to session.  Patient found supine with tracheostomy, PEG Tube, oxygen, pulse ox (continuous), telemetry, blood pressure cuff, bed alarm, RENETTA drain, peripheral IV, SCD, PureWick  upon PT entry to room.    General Precautions: Standard, fall   Orthopedic Precautions:N/A   Braces: N/A     Exams:  · Sensation:    · -       Intact  · RLE ROM: WFL except limits at ankle from pain  · RLE Strength: Deficits: MARQIUS 2/2 painful to touch  · LLE ROM: WNL  · LLE Strength: Deficits: 4/5 grossly    Functional Mobility:  · Bed Mobility:     · Scooting: minimum assistance  · Supine to Sit: moderate assistance  · Sit to Supine: moderate assistance  · Transfers:  Not tested this date 2/2 pain in (R) LE  · Balance: Pt able to sit EOB for ~15 minutes today with CGA initially progressing towards SBA.      Therapeutic Activities and Exercises:   -Pt educated on:  A. PT POC and role of PT  B. Importance of OOB activity to improve functional outcomes    C. D/c planning      AM-PAC 6 CLICK MOBILITY  Total Score:12     Patient left supine with all lines intact, call button in reach and nsg notified.    GOALS:   Multidisciplinary Problems     Physical Therapy Goals        Problem: Physical Therapy Goal    Goal Priority Disciplines Outcome Goal Variances Interventions   Physical Therapy Goal     PT, PT/OT Ongoing (interventions implemented as appropriate)     Description:  Goals to be met by: 6/10/19     Patient will increase functional independence with mobility by performin. Supine to sit with  Set-up [place]  2. Sit to supine with Set-up [place]  3. Sit to stand transfer with Minimal Assistance  4. Bed to chair transfer with Minimal Assistance using appropriate AD  5. Gait  x 50 feet with Minimal Assistance using appropriate AD.   6. Lower extremity exercise program x20-30 reps per handout, with independence to improve functional strength and endurance                      History:     Past Medical History:   Diagnosis Date    Cancer        Past Surgical History:   Procedure Laterality Date    APPENDECTOMY      CHOLECYSTECTOMY      CREATION, FREE FLAP Right 5/30/2019    Performed by Navdeep Dangelo MD at [facility] [address] [place] [address]    DISSECTION, NECK Right 5/30/2019    Performed by Wallace Santiago MD at [facility] [address] [place] [address]    EGD, WITH PEG TUBE INSERTION  5/30/2019    Performed by Ottoniel Jimenez MD at [facility] [address] [place] [address]    HYSTERECTOMY      MANDIBLE SURGERY      MANDIBULECTOMY Right 5/30/2019    Performed by Wallace Santiago MD at [facility] [address] [place] [address]    TRACHEOTOMY N/A 5/30/2019    Performed by Navdeep Dangelo MD at [facility] [address] [place] [address]       Time Tracking:     PT Received On: 06/01/19  PT Start Time: 1028     PT Stop Time: 1054  PT Total Time (min): 26 min     Billable Minutes: Evaluation 26      Terence Jay, PT  06/01/2019

## 2019-06-01 NOTE — SUBJECTIVE & OBJECTIVE
Interval History:  No acute events since surgery.  Flap checks have been stable.  This AM she complains of lower right leg pain.     Medications:  Continuous Infusions:   lactated ringers 100 mL/hr at 06/01/19 0900    morphine       Scheduled Meds:   acetaminophen  650 mg Per G Tube Q6H    ampicillin-sulbactim (UNASYN) IVPB  1.5 g Intravenous Q8H    enoxaparin  40 mg Subcutaneous Daily    potassium phosphate IVPB  30 mmol Intravenous Once     PRN Meds:naloxone     Review of patient's allergies indicates:   Allergen Reactions    Bactrim [sulfamethoxazole-trimethoprim]     Clindamycin     Keflex [cephalexin]      Tolerated Unasyn 05/31/2019    Lortab [hydrocodone-acetaminophen]     Tramadol      DIZZY AND NAUSEA, & VOMITTING     Objective:     Vital Signs (24h Range):  Temp:  [98.6 °F (37 °C)-99.3 °F (37.4 °C)] 99.3 °F (37.4 °C)  Pulse:  [53-74] 63  Resp:  [8-25] 15  SpO2:  [90 %-99 %] 94 %  BP: ()/(46-65) 105/51  Arterial Line BP: (103-138)/(44-59) 131/53     Date 06/01/19 0700 - 06/02/19 0659   Shift 2042-4804 2098-1823 4373-0265 24 Hour Total   INTAKE   Shift Total(mL/kg)       OUTPUT   Urine(mL/kg/hr) 0   0   Shift Total(mL/kg) 0(0)   0(0)   Weight (kg) 82.3 82.3 82.3 82.3     Lines/Drains/Airways     Drain                 Closed/Suction Drain 05/30/19 2024 Right Leg Bulb 15 Fr. 1 day         Closed/Suction Drain 05/30/19 2024 Right Neck Bulb 15 Fr. 1 day         Gastrostomy/Enterostomy 05/30/19 1140 Percutaneous endoscopic gastrostomy (PEG) LUQ feeding 1 day          Airway                 Surgical Airway 05/30/19 1120 Shiley Cuffed 1 day          Peripheral Intravenous Line                 Peripheral IV - Single Lumen 05/30/19 0847 22 G Right Wrist 2 days         Peripheral IV - Single Lumen 05/30/19 18 G Left Hand 2 days         Peripheral IV - Single Lumen 05/30/19 18 G Right Hand 2 days         Peripheral IV - Single Lumen 05/31/19 0400 22 G Left Wrist 1 day              Physical Exam      Awake, appropriate affect, follows commands  Normocephalic  Oral cavity with fibula paddle in place to R buccal mucosa/retromolar trigone.  Strong arterial and venous signal, color approximates leg skin.    Tongue mobile  Neck incision with staple line intact  Neck RENETTA drain with sanguinous output - 65cc/24h  Tracheostomy in place, 6 DCT, cuff deflated    R leg with boot in place, RENETTA with sanguinous output, 25cc       Significant Labs:  BMP:   Recent Labs   Lab 06/01/19  0310   GLU 87  87   *  113*   CO2 21*  21*   BUN 10  10   CREATININE 0.7  0.7   CALCIUM 7.5*  7.5*  7.5*   MG 1.8     CBC:   Recent Labs   Lab 06/01/19  0310   WBC 9.17  9.17   RBC 2.84*  2.84*   HGB 8.5*  8.5*   HCT 26.3*  26.3*   *  116*   MCV 93  93   MCH 29.9  29.9   MCHC 32.3  32.3       Significant Diagnostics:  I have reviewed all pertinent imaging results/findings within the past 24 hours.

## 2019-06-01 NOTE — PROGRESS NOTES
[facility]  Critical Care - Surgery  Progress Note    Patient Name: Jennifer Andre  MRN: [id number]  Admission Date: 5/30/2019  Hospital Length of Stay: 2 days  Code Status: Full Code  Attending Provider: Wallace Santiago MD  Primary Care Provider: Marcello Benavidez MD   Principal Problem: Squamous cell carcinoma    Subjective:     Hospital/ICU Course:  No notes on file    Interval History/Significant Events: No issues with flap overnight. H/H 8.5/26.3 from 10.1/30.9. UOP marginal overnight, improved s/p 500 cc NS bolus. Febrile to 101.1 yesterday morning, afebrile during the day yesterday and overnight.    Follow-up For: Procedure(s) (LRB):  MANDIBULECTOMY (Right)  DISSECTION, NECK (Right)  CREATION, FREE FLAP (Right)  EGD, WITH PEG TUBE INSERTION  TRACHEOTOMY (N/A)    Post-Operative Day: 2 Days Post-Op    Objective:     Vital Signs (Most Recent):  Temp: 99.2 °F (37.3 °C) (06/01/19 0330)  Pulse: 72 (06/01/19 0700)  Resp: (!) 21 (06/01/19 0700)  BP: (!) 102/51 (06/01/19 0630)  SpO2: 97 % (06/01/19 0700) Vital Signs (24h Range):  Temp:  [98.6 °F (37 °C)-101.1 °F (38.4 °C)] 99.2 °F (37.3 °C)  Pulse:  [53-74] 72  Resp:  [10-25] 21  SpO2:  [90 %-99 %] 97 %  BP: ()/(46-65) 102/51  Arterial Line BP: (102-138)/(44-60) 131/53     Weight: 82.3 kg (181 lb 7 oz)  Body mass index is 32.14 kg/m².      Intake/Output Summary (Last 24 hours) at 6/1/2019 0744  Last data filed at 6/1/2019 0600  Gross per 24 hour   Intake 3286 ml   Output 1005 ml   Net 2281 ml       Physical Exam   Constitutional: She is oriented to person, place, and time. She appears well-developed and well-nourished. No distress.   HENT:   Neck incision clean/dry/intact  R neck RENETTA in place draining serosanguinous   Eyes: No scleral icterus.   Cardiovascular: Normal rate and regular rhythm.   Pulmonary/Chest: Effort normal. No respiratory distress.   Abdominal: Soft. She exhibits no distension.   Musculoskeletal:   R thigh skin graft donor  site clean/dry/intact  R leg RENETTA in place draining serosanguinous  R leg bandage and leg brace in place   Neurological: She is alert and oriented to person, place, and time.   Skin: Skin is warm and dry.   Psychiatric: She has a normal mood and affect. Her behavior is normal. Judgment and thought content normal.   Vitals reviewed.      Vents:  Oxygen Concentration (%): 28 (06/01/19 0330)    Lines/Drains/Airways     Drain                 Closed/Suction Drain 05/30/19 2024 Right Leg Bulb 15 Fr. 1 day         Closed/Suction Drain 05/30/19 2024 Right Neck Bulb 15 Fr. 1 day         Gastrostomy/Enterostomy 05/30/19 1140 Percutaneous endoscopic gastrostomy (PEG) LUQ feeding 1 day          Airway                 Surgical Airway 05/30/19 1120 Shiley Cuffed 1 day          Peripheral Intravenous Line                 Peripheral IV - Single Lumen 05/30/19 18 G Left Hand 2 days         Peripheral IV - Single Lumen 05/30/19 18 G Right Hand 2 days         Peripheral IV - Single Lumen 05/30/19 0847 22 G Right Wrist 1 day         Peripheral IV - Single Lumen 05/31/19 0400 22 G Left Wrist 1 day                Significant Labs:    CBC/Anemia Profile:  Recent Labs   Lab 05/30/19 2217 05/31/19  0400 06/01/19  0310   WBC 13.44* 13.03*  13.03* 9.17  9.17   HGB 10.6* 10.1*  10.1* 8.5*  8.5*   HCT 33.2* 30.9*  30.9* 26.3*  26.3*    158  158 116*  116*   MCV 93 90  90 93  93   RDW 13.6 13.8  13.8 14.0  14.0        Chemistries:  Recent Labs   Lab 05/30/19 2217 05/31/19  0400 06/01/19  0310    139  139 140  140   K 4.4 3.7  3.7 3.5  3.5    112*  112* 113*  113*   CO2 20* 16*  16* 21*  21*   BUN 11 11  11 10  10   CREATININE 0.7 0.7  0.7 0.7  0.7   CALCIUM 7.3* 7.9*  7.9*  7.9* 7.5*  7.5*  7.5*   ALBUMIN 2.6* 2.7* 2.2*   PROT 5.2* 5.5* 4.9*   BILITOT 0.3 0.3 0.3   ALKPHOS 72 69 53*   ALT 8* 11 12   AST 17 28 35   MG 1.7 1.8 1.8   PHOS 3.7 2.1* 1.9*       All pertinent labs within the past 24 hours  have been reviewed.    Significant Imaging:  I have reviewed all pertinent imaging results/findings within the past 24 hours.    Assessment/Plan:     * Squamous cell carcinoma  64 y.o. 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    Neuro:   - AAOx4  - pain control with morphine PCA  - scheduled tylenol per G tube     Pulmonary:   - trach collar  - monitor respiratory status     Cardiac:   - HDS  - monitor     Renal:    -  UOP marginal, improved s/p 500 cc LR bolus  - monitor UOP  - BUN/Cr 10/0.7, stable     ID:   -  Tmax 101.1 yesterday morning, afebrile the remainder of the day and overnight  - WBC 9.2 from 13  - Perioperative unasyn     Hem/Onc:   -  H/H 8.5/26.3 from 10.1/30.9 overnight  - monitor H/H  - transfuse PRN     Endocrine:    - monitor BG     Fluids/Electrolytes/Nutrition/GI:   - NPO  - Replace electrolytes PRN  - hyperchloremic - changed mIVF to LR  - mIVF: LR @ 100 cc/hr  - staring TF at 20 cc/hr of impact peptide, increase to 30 cc tonight     DISPO:  - continue SICU care  - flap checks  - OOB/PT/OT          Critical care was time spent personally by me on the following activities: development of treatment plan with patient or surrogate and bedside caregivers, discussions with consultants, evaluation of patient's response to treatment, examination of patient, ordering and performing treatments and interventions, ordering and review of laboratory studies, ordering and review of radiographic studies, pulse oximetry, re-evaluation of patient's condition.  This critical care time did not overlap with that of any other provider or involve time for any procedures.     Chintan Jose MD  Critical Care - Surgery  [facility]

## 2019-06-01 NOTE — PT/OT/SLP EVAL
Occupational Therapy   Evaluation    Name: Jennifer Andre  MRN: [id number]  Admitting Diagnosis:  Squamous cell carcinoma 2 Days Post-Op   S/p PEG placement, tracheostomy, right neck dissection, composite resection of right oral cavity squamous cell carcinoma with segmental mandibulectomy, reconstruction with fibular free flap, skin graft from right thigh    Recommendations:     Discharge Recommendations:   SNF  Discharge Equipment Recommendations:  (TBD)  Barriers to discharge:  None    Assessment:     Jennifer Andre is a 64 y.o. female with a medical diagnosis of Squamous cell carcinoma.  She presents alert, pleasant, and willing to participate in therapy evaluation. Upon arrival, pt found supine with spouse at bedside.  Performance deficits affecting function: weakness, impaired endurance, impaired functional mobilty, gait instability, impaired self care skills, impaired balance, decreased lower extremity function, pain, impaired skin, edema, impaired cardiopulmonary response to activity. Evaluation and overall performance limited 2/2 significant RLE pain. Pt sat EOB ~15 minutes requiring CGA-SBA and all vital stable throughout. She would benefit from SNF following d/c to continue to progress towards goals and improve quality of life.     Rehab Prognosis: Good; patient would benefit from acute skilled OT services to address these deficits and reach maximum level of function.       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  Patient found supine with tracheostomy, PEG Tube, oxygen, pulse ox (continuous), telemetry, blood pressure cuff, bed alarm, RENETTA drain, peripheral IV, SCD, PureWick upon OT entry to room.    General Precautions: Standard, fall   Orthopedic Precautions:N/A   Braces: (walking boot in room-- not donned upon arrival)     Occupational Performance:    Bed Mobility:    · Patient completed Rolling/Turning to Right with moderate assistance  · Patient completed Supine to Sit with moderate assistance  · Patient completed Sit to Supine with moderate assistance   * RLE management  ( pain limiting); no assistance provided     Sitting EOB:   Pt sat EOB ~15 minutes with CGA-SBA for balance and safety    Functional Mobility/Transfers:  · NT 2/2 RLE pain    Activities of Daily Living:  · Grooming: stand by assistance with set-up assistance to wash face while seated EOB  · Upper Body Dressing: maximal assistance to uyen gown like jacket while seated EOB ( 2/2 mulitple lines)    Cognitive/Visual Perceptual:  Cognitive/Psychosocial Skills:     -       Oriented to: Person, Place, Time and Situation   -       Follows Commands/attention:Follows multistep  commands  -       Safety awareness/insight to disability: intact   -       Mood/Affect/Coping skills/emotional control: Appropriate to situation  Visual/Perceptual:       -Intact     Physical Exam:  Postural examination/scapula alignment:    -       Rounded shoulders  Skin integrity: RLE skin graft  Edema:  Moderate B hands  Dominant hand:    -       RUE  Upper Extremity Range of Motion:     -       Right Upper Extremity: WFL  -       Left Upper Extremity: WFL  Upper Extremity Strength:    -       Right Upper Extremity: WFL 4+/5  -       Left Upper Extremity: WFL  4+/5   Strength:    -       Right Upper Extremity: WFL  -       Left Upper Extremity: WFL  Fine Motor Coordination:    -       Intact    AMPAC 6 Click ADL:  AMPAC Total Score: 11    Treatment & Education:  -Pt edu on OT role/POC  -Importance of OOB activity with staff assistance  -Safety during functional t/f and mobility  -White board updated  - Multiple self care tasks completed--as noted above  - All questions/concerns answered within OT scope of practice  Education:    Patient left supine with all lines intact, call button in reach and RLE notified    GOALS:   Multidisciplinary Problems     Occupational Therapy Goals        Problem: Occupational Therapy Goal    Goal Priority Disciplines Outcome Interventions   Occupational Therapy Goal     OT, PT/OT Ongoing (interventions implemented as appropriate)    Description:  Goals to be met by: 6/10/19    Patient will increase functional independence with ADLs by performing:    UE Dressing with Stand-by Assistance.  LE Dressing with Moderate Assistance.  Grooming while standing at sink with Minimal Assistance.  Toileting from bedside commode with Moderate Assistance for hygiene and clothing management.   Supine to sit with Minimal Assistance.  Stand pivot transfers with Moderate Assistance in prep for performance of functional activity.  Toilet transfer to bedside commodper handout, with independence.                      History:     Past Medical History:   Diagnosis Date    Cancer        Past Surgical History:   Procedure Laterality Date    APPENDECTOMY       CHOLECYSTECTOMY      CREATION, FREE FLAP Right 5/30/2019    Performed by [facility]deep [facility]angelo MD at [facility] OR [address] [place] [address]    DISSECTION, NECK Right 5/30/2019    Performed by Wallace Santiago MD at [facility] OR [address] [place] [address]    EGD, WITH PEG TUBE INSERTION  5/30/2019    Performed by Ottoniel Jimenez MD at [facility] OR [address] [place] [address]    HYSTERECTOMY      MANDIBLE SURGERY      MANDIBULECTOMY Right 5/30/2019    Performed by Wallace Santiago MD at [facility] OR [address] [place] [address]    TRACHEOTOMY N/A 5/30/2019    Performed by Navdeep Dangelo MD at [facility] OR [address] [place] [address]       Time Tracking:     OT Date of Treatment: 06/01/19  OT Start Time: 1028  OT Stop Time: 1054  OT Total Time (min): 26 min    Billable Minutes:Evaluation 26    Lauryn narvaez OT  6/1/2019

## 2019-06-01 NOTE — SUBJECTIVE & OBJECTIVE
Interval History/Significant Events: No issues with flap overnight. H/H 8.5/26.3 from 10.1/30.9. UOP marginal overnight, improved s/p 500 cc NS bolus. Febrile to 101.1 yesterday morning, afebrile during the day yesterday and overnight.    Follow-up For: Procedure(s) (LRB):  MANDIBULECTOMY (Right)  DISSECTION, NECK (Right)  CREATION, FREE FLAP (Right)  EGD, WITH PEG TUBE INSERTION  TRACHEOTOMY (N/A)    Post-Operative Day: 2 Days Post-Op    Objective:     Vital Signs (Most Recent):  Temp: 99.2 °F (37.3 °C) (06/01/19 0330)  Pulse: 72 (06/01/19 0700)  Resp: (!) 21 (06/01/19 0700)  BP: (!) 102/51 (06/01/19 0630)  SpO2: 97 % (06/01/19 0700) Vital Signs (24h Range):  Temp:  [98.6 °F (37 °C)-101.1 °F (38.4 °C)] 99.2 °F (37.3 °C)  Pulse:  [53-74] 72  Resp:  [10-25] 21  SpO2:  [90 %-99 %] 97 %  BP: ()/(46-65) 102/51  Arterial Line BP: (102-138)/(44-60) 131/53     Weight: 82.3 kg (181 lb 7 oz)  Body mass index is 32.14 kg/m².      Intake/Output Summary (Last 24 hours) at 6/1/2019 0744  Last data filed at 6/1/2019 0600  Gross per 24 hour   Intake 3286 ml   Output 1005 ml   Net 2281 ml       Physical Exam   Constitutional: She is oriented to person, place, and time. She appears well-developed and well-nourished. No distress.   HENT:   Neck incision clean/dry/intact  R neck RENETTA in place draining serosanguinous   Eyes: No scleral icterus.   Cardiovascular: Normal rate and regular rhythm.   Pulmonary/Chest: Effort normal. No respiratory distress.   Abdominal: Soft. She exhibits no distension.   Musculoskeletal:   R thigh skin graft donor site clean/dry/intact  R leg RENETTA in place draining serosanguinous  R leg bandage and leg brace in place   Neurological: She is alert and oriented to person, place, and time.   Skin: Skin is warm and dry.   Psychiatric: She has a normal mood and affect. Her behavior is normal. Judgment and thought content normal.   Vitals reviewed.      Vents:  Oxygen Concentration (%): 28 (06/01/19  0330)    Lines/Drains/Airways     Drain                 Closed/Suction Drain 05/30/19 2024 Right Leg Bulb 15 Fr. 1 day         Closed/Suction Drain 05/30/19 2024 Right Neck Bulb 15 Fr. 1 day         Gastrostomy/Enterostomy 05/30/19 1140 Percutaneous endoscopic gastrostomy (PEG) LUQ feeding 1 day          Airway                 Surgical Airway 05/30/19 1120 Shiley Cuffed 1 day          Peripheral Intravenous Line                 Peripheral IV - Single Lumen 05/30/19 18 G Left Hand 2 days         Peripheral IV - Single Lumen 05/30/19 18 G Right Hand 2 days         Peripheral IV - Single Lumen 05/30/19 0847 22 G Right Wrist 1 day         Peripheral IV - Single Lumen 05/31/19 0400 22 G Left Wrist 1 day                Significant Labs:    CBC/Anemia Profile:  Recent Labs   Lab 05/30/19 2217 05/31/19  0400 06/01/19  0310   WBC 13.44* 13.03*  13.03* 9.17  9.17   HGB 10.6* 10.1*  10.1* 8.5*  8.5*   HCT 33.2* 30.9*  30.9* 26.3*  26.3*    158  158 116*  116*   MCV 93 90  90 93  93   RDW 13.6 13.8  13.8 14.0  14.0        Chemistries:  Recent Labs   Lab 05/30/19 2217 05/31/19  0400 06/01/19  0310    139  139 140  140   K 4.4 3.7  3.7 3.5  3.5    112*  112* 113*  113*   CO2 20* 16*  16* 21*  21*   BUN 11 11  11 10  10   CREATININE 0.7 0.7  0.7 0.7  0.7   CALCIUM 7.3* 7.9*  7.9*  7.9* 7.5*  7.5*  7.5*   ALBUMIN 2.6* 2.7* 2.2*   PROT 5.2* 5.5* 4.9*   BILITOT 0.3 0.3 0.3   ALKPHOS 72 69 53*   ALT 8* 11 12   AST 17 28 35   MG 1.7 1.8 1.8   PHOS 3.7 2.1* 1.9*       All pertinent labs within the past 24 hours have been reviewed.    Significant Imaging:  I have reviewed all pertinent imaging results/findings within the past 24 hours.

## 2019-06-01 NOTE — PROGRESS NOTES
[facility]  Otorhinolaryngology-Head & Neck Surgery  Progress Note    Subjective:     Post-Op Info:  Procedure(s) (LRB):  MANDIBULECTOMY (Right)  DISSECTION, NECK (Right)  CREATION, FREE FLAP (Right)  EGD, WITH PEG TUBE INSERTION  TRACHEOTOMY (N/A)   2 Days Post-Op  Hospital Day: 3     Interval History:  No acute events since surgery.  Flap checks have been stable.  This AM she complains of lower right leg pain.     Medications:  Continuous Infusions:   lactated ringers 100 mL/hr at 06/01/19 0900    morphine       Scheduled Meds:   acetaminophen  650 mg Per G Tube Q6H    ampicillin-sulbactim (UNASYN) IVPB  1.5 g Intravenous Q8H    enoxaparin  40 mg Subcutaneous Daily    potassium phosphate IVPB  30 mmol Intravenous Once     PRN Meds:naloxone     Review of patient's allergies indicates:   Allergen Reactions    Bactrim [sulfamethoxazole-trimethoprim]     Clindamycin     Keflex [cephalexin]      Tolerated Unasyn 05/31/2019    Lortab [hydrocodone-acetaminophen]     Tramadol      DIZZY AND NAUSEA, & VOMITTING     Objective:     Vital Signs (24h Range):  Temp:  [98.6 °F (37 °C)-99.3 °F (37.4 °C)] 99.3 °F (37.4 °C)  Pulse:  [53-74] 63  Resp:  [8-25] 15  SpO2:  [90 %-99 %] 94 %  BP: ()/(46-65) 105/51  Arterial Line BP: (103-138)/(44-59) 131/53     Date 06/01/19 0700 - 06/02/19 0659   Shift 0549-9094 5301-4938 5192-9715 24 Hour Total   INTAKE   Shift Total(mL/kg)       OUTPUT   Urine(mL/kg/hr) 0   0   Shift Total(mL/kg) 0(0)   0(0)   Weight (kg) 82.3 82.3 82.3 82.3     Lines/Drains/Airways     Drain                 Closed/Suction Drain 05/30/19 2024 Right Leg Bulb 15 Fr. 1 day         Closed/Suction Drain 05/30/19 2024 Right Neck Bulb 15 Fr. 1 day         Gastrostomy/Enterostomy 05/30/19 1140 Percutaneous endoscopic gastrostomy (PEG) LUQ feeding 1 day          Airway                 Surgical Airway 05/30/19 1120 Shiley Cuffed 1 day          Peripheral Intravenous Line                  Peripheral IV - Single Lumen 05/30/19 0847 22 G Right Wrist 2 days         Peripheral IV - Single Lumen 05/30/19 18 G Left Hand 2 days         Peripheral IV - Single Lumen 05/30/19 18 G Right Hand 2 days         Peripheral IV - Single Lumen 05/31/19 0400 22 G Left Wrist 1 day              Physical Exam     Awake, appropriate affect, follows commands  Normocephalic  Oral cavity with fibula paddle in place to R buccal mucosa/retromolar trigone.  Strong arterial and venous signal, color approximates leg skin.    Tongue mobile  Neck incision with staple line intact  Neck RENETTA drain with sanguinous output - 65cc/24h  Tracheostomy in place, 6 DCT, cuff deflated    R leg with boot in place, RENETTA with sanguinous output, 25cc       Significant Labs:  BMP:   Recent Labs   Lab 06/01/19  0310   GLU 87  87   *  113*   CO2 21*  21*   BUN 10  10   CREATININE 0.7  0.7   CALCIUM 7.5*  7.5*  7.5*   MG 1.8     CBC:   Recent Labs   Lab 06/01/19  0310   WBC 9.17  9.17   RBC 2.84*  2.84*   HGB 8.5*  8.5*   HCT 26.3*  26.3*   *  116*   MCV 93  93   MCH 29.9  29.9   MCHC 32.3  32.3       Significant Diagnostics:  I have reviewed all pertinent imaging results/findings within the past 24 hours.    Assessment/Plan:     Malignant neoplasm of oral cavity  POD 2 - Composite resection right oral cavity and right mandible, right modified radical neck dissection, right fibular free flap reconstruction, split thickness skin graft from right thigh, tracheostomy, PEG placement.       OOB to chair  Start trickle tube feeds once G-tube cleared by general surgery  Continue Q1H flap checks and RLE neuro checks. Transition to Q2h flap checks at 10pm tonight.   PT/OT/Nutrition consulted  Continue ICU status        Isael Quach,   Otorhinolaryngology-Head & Neck Surgery  [facility]  06/01/2019

## 2019-06-01 NOTE — ASSESSMENT & PLAN NOTE
POD 2 - Composite resection right oral cavity and right mandible, right modified radical neck dissection, right fibular free flap reconstruction, split thickness skin graft from right thigh, tracheostomy, PEG placement.       OOB to chair  Start trickle tube feeds once G-tube cleared by general surgery  Continue Q1H flap checks and RLE neuro checks. Transition to Q2h flap checks at 10pm tonight.   PT/OT/Nutrition consulted  Continue ICU status

## 2019-06-01 NOTE — PLAN OF CARE
Problem: Adult Inpatient Plan of Care  Goal: Plan of Care Review  Outcome: Ongoing (interventions implemented as appropriate)  Patient free of falls/traumas/injuries. Plan of care discussed with patient and family.  Neuro: AAOx4, follows commands.  Cardiac: SB on telemetry with HR in 50s, SBP <160 and Maps >65.  Respiratory: Trach collar with O2 at 5L and 28% with SpO2 91-94%  GI/: Andersen in place with UOP 10-20mls/hr. 500ml bolus NS given for low UOP.   Flap to right intraoral cavity pink, moist, and warm with a strong arterial pulse. Right lower extremity warm and pink with no numbness or tingling noted by patient, Pt able to move toes when prompted. Kerlix and boot in place on extremity. Right thigh graft site covered by tegaderm with scant serous drainage. Neck incision with staples approximated and C/D/I.

## 2019-06-01 NOTE — PLAN OF CARE
Problem: Physical Therapy Goal  Goal: Physical Therapy Goal  Goals to be met by: 6/10/19     Patient will increase functional independence with mobility by performin. Supine to sit with Set-up [place]  2. Sit to supine with Set-up [place]  3. Sit to stand transfer with Minimal Assistance  4. Bed to chair transfer with Minimal Assistance using appropriate AD  5. Gait  x 50 feet with Minimal Assistance using appropriate AD.   6. Lower extremity exercise program x20-30 reps per handout, with independence to improve functional strength and endurance    Outcome: Ongoing (interventions implemented as appropriate)  PT eval complete. Pt tolerated session well today but was limited in activity tolerance 2/2 fatigue and pain in (R) LE. Pt required (A) for bed mobility tasks but was able to sit at EOB without (A) after set-up. Pt performed self-care tasks at balance tasks at EOB with no posterior LOB. Pt will cont to benefit from therapy services and is appropriate for SNF placement at this time to maximize functional outcomes.

## 2019-06-01 NOTE — PLAN OF CARE
Problem: Occupational Therapy Goal  Goal: Occupational Therapy Goal  Goals to be met by: 6/10/19    Patient will increase functional independence with ADLs by performing:    UE Dressing with Stand-by Assistance.  LE Dressing with Moderate Assistance.  Grooming while standing at sink with Minimal Assistance.  Toileting from bedside commode with Moderate Assistance for hygiene and clothing management.   Supine to sit with Minimal Assistance.  Stand pivot transfers with Moderate Assistance in prep for performance of functional activity.  Toilet transfer to bedside commodper handout, with independence.    Outcome: Ongoing (interventions implemented as appropriate)  Evaluation completed. Initiate POC.   Lauryn narvaez OT  6/1/2019

## 2019-06-01 NOTE — ASSESSMENT & PLAN NOTE
64 y.o. 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    Neuro:   - AAOx4  - pain control with PRN morphine IV     Pulmonary:   - trach collar  - monitor respiratory status     Cardiac:   - HDS  - monitor     Renal:    - UOP marginal, improved s/p 500 cc LR bolus  - monitor UOP  - BUN/Cr 10/0.7, stable     ID:   - Tmax 101.1 yesterday morning, afebrile the remainder of the day and overnight  - WBC 9.2 from 13  - Perioperative unasyn     Hem/Onc:   - H/H 8.5/26.3 from 10.1/30.9 overnight  - monitor H/H  - transfuse PRN     Endocrine:    - monitor BG     Fluids/Electrolytes/Nutrition/GI:   - NPO  - Replace electrolytes PRN  - hyperchloremic - changed mIVF to LR  - mIVF: LR @ 100 cc/hr  - start TF per primary, consider impact peptide      DISPO:  - continue SICU care  - flap checks  - OOB/PT/OT

## 2019-06-02 LAB
ALBUMIN SERPL BCP-MCNC: 2.2 G/DL (ref 3.5–5.2)
ALP SERPL-CCNC: 55 U/L (ref 55–135)
ALT SERPL W/O P-5'-P-CCNC: 14 U/L (ref 10–44)
ANION GAP SERPL CALC-SCNC: 5 MMOL/L (ref 8–16)
ANION GAP SERPL CALC-SCNC: 5 MMOL/L (ref 8–16)
AST SERPL-CCNC: 37 U/L (ref 10–40)
BASOPHILS # BLD AUTO: 0.03 K/UL (ref 0–0.2)
BASOPHILS # BLD AUTO: 0.03 K/UL (ref 0–0.2)
BASOPHILS NFR BLD: 0.4 % (ref 0–1.9)
BASOPHILS NFR BLD: 0.4 % (ref 0–1.9)
BILIRUB SERPL-MCNC: 0.3 MG/DL (ref 0.1–1)
BUN SERPL-MCNC: 9 MG/DL (ref 8–23)
BUN SERPL-MCNC: 9 MG/DL (ref 8–23)
CALCIUM SERPL-MCNC: 8.1 MG/DL (ref 8.7–10.5)
CHLORIDE SERPL-SCNC: 108 MMOL/L (ref 95–110)
CHLORIDE SERPL-SCNC: 108 MMOL/L (ref 95–110)
CO2 SERPL-SCNC: 24 MMOL/L (ref 23–29)
CO2 SERPL-SCNC: 24 MMOL/L (ref 23–29)
CREAT SERPL-MCNC: 0.7 MG/DL (ref 0.5–1.4)
CREAT SERPL-MCNC: 0.7 MG/DL (ref 0.5–1.4)
DIFFERENTIAL METHOD: ABNORMAL
DIFFERENTIAL METHOD: ABNORMAL
EOSINOPHIL # BLD AUTO: 0 K/UL (ref 0–0.5)
EOSINOPHIL # BLD AUTO: 0 K/UL (ref 0–0.5)
EOSINOPHIL NFR BLD: 0.1 % (ref 0–8)
EOSINOPHIL NFR BLD: 0.1 % (ref 0–8)
ERYTHROCYTE [DISTWIDTH] IN BLOOD BY AUTOMATED COUNT: 13.7 % (ref 11.5–14.5)
ERYTHROCYTE [DISTWIDTH] IN BLOOD BY AUTOMATED COUNT: 13.7 % (ref 11.5–14.5)
EST. GFR  (AFRICAN AMERICAN): >60 ML/MIN/1.73 M^2
EST. GFR  (AFRICAN AMERICAN): >60 ML/MIN/1.73 M^2
EST. GFR  (NON AFRICAN AMERICAN): >60 ML/MIN/1.73 M^2
EST. GFR  (NON AFRICAN AMERICAN): >60 ML/MIN/1.73 M^2
GLUCOSE SERPL-MCNC: 102 MG/DL (ref 70–110)
GLUCOSE SERPL-MCNC: 102 MG/DL (ref 70–110)
HCT VFR BLD AUTO: 28.5 % (ref 37–48.5)
HCT VFR BLD AUTO: 28.5 % (ref 37–48.5)
HGB BLD-MCNC: 9 G/DL (ref 12–16)
HGB BLD-MCNC: 9 G/DL (ref 12–16)
IMM GRANULOCYTES # BLD AUTO: 0.02 K/UL (ref 0–0.04)
IMM GRANULOCYTES # BLD AUTO: 0.02 K/UL (ref 0–0.04)
IMM GRANULOCYTES NFR BLD AUTO: 0.3 % (ref 0–0.5)
IMM GRANULOCYTES NFR BLD AUTO: 0.3 % (ref 0–0.5)
LYMPHOCYTES # BLD AUTO: 0.6 K/UL (ref 1–4.8)
LYMPHOCYTES # BLD AUTO: 0.6 K/UL (ref 1–4.8)
LYMPHOCYTES NFR BLD: 8.3 % (ref 18–48)
LYMPHOCYTES NFR BLD: 8.3 % (ref 18–48)
MAGNESIUM SERPL-MCNC: 1.8 MG/DL (ref 1.6–2.6)
MCH RBC QN AUTO: 29.5 PG (ref 27–31)
MCH RBC QN AUTO: 29.5 PG (ref 27–31)
MCHC RBC AUTO-ENTMCNC: 31.6 G/DL (ref 32–36)
MCHC RBC AUTO-ENTMCNC: 31.6 G/DL (ref 32–36)
MCV RBC AUTO: 93 FL (ref 82–98)
MCV RBC AUTO: 93 FL (ref 82–98)
MONOCYTES # BLD AUTO: 0.5 K/UL (ref 0.3–1)
MONOCYTES # BLD AUTO: 0.5 K/UL (ref 0.3–1)
MONOCYTES NFR BLD: 7.6 % (ref 4–15)
MONOCYTES NFR BLD: 7.6 % (ref 4–15)
NEUTROPHILS # BLD AUTO: 5.8 K/UL (ref 1.8–7.7)
NEUTROPHILS # BLD AUTO: 5.8 K/UL (ref 1.8–7.7)
NEUTROPHILS NFR BLD: 83.3 % (ref 38–73)
NEUTROPHILS NFR BLD: 83.3 % (ref 38–73)
NRBC BLD-RTO: 0 /100 WBC
NRBC BLD-RTO: 0 /100 WBC
PHOSPHATE SERPL-MCNC: 2.8 MG/DL (ref 2.7–4.5)
PLATELET # BLD AUTO: 113 K/UL (ref 150–350)
PLATELET # BLD AUTO: 113 K/UL (ref 150–350)
PMV BLD AUTO: 11.6 FL (ref 9.2–12.9)
PMV BLD AUTO: 11.6 FL (ref 9.2–12.9)
POTASSIUM SERPL-SCNC: 4 MMOL/L (ref 3.5–5.1)
POTASSIUM SERPL-SCNC: 4 MMOL/L (ref 3.5–5.1)
PROT SERPL-MCNC: 5.4 G/DL (ref 6–8.4)
RBC # BLD AUTO: 3.05 M/UL (ref 4–5.4)
RBC # BLD AUTO: 3.05 M/UL (ref 4–5.4)
SODIUM SERPL-SCNC: 137 MMOL/L (ref 136–145)
SODIUM SERPL-SCNC: 137 MMOL/L (ref 136–145)
WBC # BLD AUTO: 6.97 K/UL (ref 3.9–12.7)
WBC # BLD AUTO: 6.97 K/UL (ref 3.9–12.7)

## 2019-06-02 PROCEDURE — 63600175 PHARM REV CODE 636 W HCPCS: Performed by: STUDENT IN AN ORGANIZED HEALTH CARE EDUCATION/TRAINING PROGRAM

## 2019-06-02 PROCEDURE — 25000003 PHARM REV CODE 250: Performed by: STUDENT IN AN ORGANIZED HEALTH CARE EDUCATION/TRAINING PROGRAM

## 2019-06-02 PROCEDURE — 20000000 HC ICU ROOM

## 2019-06-02 PROCEDURE — 84100 ASSAY OF PHOSPHORUS: CPT

## 2019-06-02 PROCEDURE — 99900035 HC TECH TIME PER 15 MIN (STAT)

## 2019-06-02 PROCEDURE — 94761 N-INVAS EAR/PLS OXIMETRY MLT: CPT

## 2019-06-02 PROCEDURE — 94770 HC EXHALED C02 TEST: CPT

## 2019-06-02 PROCEDURE — 85025 COMPLETE CBC W/AUTO DIFF WBC: CPT

## 2019-06-02 PROCEDURE — 27200966 HC CLOSED SUCTION SYSTEM

## 2019-06-02 PROCEDURE — 80053 COMPREHEN METABOLIC PANEL: CPT

## 2019-06-02 PROCEDURE — 99900026 HC AIRWAY MAINTENANCE (STAT)

## 2019-06-02 PROCEDURE — 36415 COLL VENOUS BLD VENIPUNCTURE: CPT

## 2019-06-02 PROCEDURE — 27000221 HC OXYGEN, UP TO 24 HOURS

## 2019-06-02 PROCEDURE — 83735 ASSAY OF MAGNESIUM: CPT

## 2019-06-02 RX ADMIN — ACETAMINOPHEN 650 MG: 325 TABLET ORAL at 05:06

## 2019-06-02 RX ADMIN — ACETAMINOPHEN 650 MG: 325 TABLET ORAL at 11:06

## 2019-06-02 RX ADMIN — ACETAMINOPHEN 650 MG: 325 TABLET ORAL at 06:06

## 2019-06-02 RX ADMIN — ENOXAPARIN SODIUM 40 MG: 100 INJECTION SUBCUTANEOUS at 04:06

## 2019-06-02 RX ADMIN — AMPICILLIN SODIUM AND SULBACTAM SODIUM 1.5 G: 1; .5 INJECTION, POWDER, FOR SOLUTION INTRAMUSCULAR; INTRAVENOUS at 08:06

## 2019-06-02 RX ADMIN — ACETAMINOPHEN 650 MG: 325 TABLET ORAL at 12:06

## 2019-06-02 RX ADMIN — AMPICILLIN SODIUM AND SULBACTAM SODIUM 1.5 G: 1; .5 INJECTION, POWDER, FOR SOLUTION INTRAMUSCULAR; INTRAVENOUS at 04:06

## 2019-06-02 RX ADMIN — SODIUM CHLORIDE, SODIUM LACTATE, POTASSIUM CHLORIDE, AND CALCIUM CHLORIDE: .6; .31; .03; .02 INJECTION, SOLUTION INTRAVENOUS at 08:06

## 2019-06-02 NOTE — PLAN OF CARE
Problem: Adult Inpatient Plan of Care  Goal: Plan of Care Review  Admit Date: 5/30/19    Admit Dx: Mandiblectomy, PEG exchange     6/1/19: PCA pump    Individualization:   1. Flap checks q 2 hrs  2. R leg neurovascular checks q 2      Past Medical History:  No date: Cancer    Past Surgical History:  No date: APPENDECTOMY  No date: CHOLECYSTECTOMY  5/30/2019: CREATION, FREE FLAP; Right      Comment:  Performed by Navdeep Dangelo MD at [facility] OR [address] [place] [address]  5/30/2019: DISSECTION, NECK; Right      Comment:  Performed by Wallace Santiago MD at [facility] OR [address] [place] [address]  5/30/2019: EGD, WITH PEG TUBE INSERTION      Comment:  Performed by Ottoniel Jimenez MD at [facility] OR [address] [place] [address]  No date: HYSTERECTOMY  No date: MANDIBLE SURGERY  5/30/2019: MANDIBULECTOMY; Right      Comment:  Performed by Wallace Santiago MD at [facility] OR [address] [place] [address]  5/30/2019: TRACHEOTOMY; N/A      Comment:  Performed by Navdeep Dangelo MD at [facility] OR [address] [place] [address]    Individualization:   1. Flap checks q 2 hrs  2. R leg neurovascular checks q 2           Outcome: Ongoing (interventions implemented as appropriate)  POC reviewed with Jennifer Andre and spouse at 1700. Pt verbalized understanding. Questions and concerns addressed. No acute events today. Pt progressing toward goals. PCA pump in use. TF at goal of 45 ml/hr. Pt has thick, copious secretions from trach that cause coughing spells. Will continue to monitor. See flowsheets for full assessment and VS info.

## 2019-06-02 NOTE — SUBJECTIVE & OBJECTIVE
Interval History/Significant Events: No issues with flap overnight. H/H stable. UOP good overnight. Tolerating tube feeds at 30 cc/hr. Afebrile, vital signs stable.     Follow-up For: Procedure(s) (LRB):  MANDIBULECTOMY (Right)  DISSECTION, NECK (Right)  CREATION, FREE FLAP (Right)  EGD, WITH PEG TUBE INSERTION  TRACHEOTOMY (N/A)    Post-Operative Day: 2 Days Post-Op    Objective:     Vital Signs (Most Recent):  Temp: 97.7 °F (36.5 °C) (06/02/19 0300)  Pulse: 68 (06/02/19 0704)  Resp: 20 (06/02/19 0704)  BP: (!) 114/55 (06/02/19 0704)  SpO2: 100 % (06/02/19 0704) Vital Signs (24h Range):  Temp:  [97.7 °F (36.5 °C)-100.2 °F (37.9 °C)] 97.7 °F (36.5 °C)  Pulse:  [54-84] 68  Resp:  [5-37] 20  SpO2:  [90 %-100 %] 100 %  BP: ()/(46-65) 114/55     Weight: 82.3 kg (181 lb 7 oz)  Body mass index is 32.14 kg/m².      Intake/Output Summary (Last 24 hours) at 6/2/2019 0739  Last data filed at 6/2/2019 0600  Gross per 24 hour   Intake 3397.5 ml   Output 2286 ml   Net 1111.5 ml       Physical Exam   Constitutional: She is oriented to person, place, and time. She appears well-developed and well-nourished. No distress.   HENT:   Neck incision clean/dry/intact  R neck RENETTA in place draining serosanguinous   Eyes: No scleral icterus.   Cardiovascular: Normal rate and regular rhythm.   Pulmonary/Chest: Effort normal. No respiratory distress.   Abdominal: Soft. She exhibits no distension.   Musculoskeletal:   R thigh skin graft donor site clean/dry/intact  R leg RENETTA in place draining serosanguinous  R leg bandage and leg brace in place   Neurological: She is alert and oriented to person, place, and time.   Skin: Skin is warm and dry.   Psychiatric: She has a normal mood and affect. Her behavior is normal. Judgment and thought content normal.   Vitals reviewed.      Vents:  Oxygen Concentration (%): 35 (06/02/19 0343)    Lines/Drains/Airways     Drain                 Closed/Suction Drain 05/30/19 2024 Right Leg Bulb 15 Fr. 2 days          Closed/Suction Drain 05/30/19 2024 Right Neck Bulb 15 Fr. 2 days         Gastrostomy/Enterostomy 05/30/19 1140 Percutaneous endoscopic gastrostomy (PEG) LUQ feeding 2 days          Airway                 Surgical Airway 05/30/19 1120 Shiley Cuffed 2 days          Peripheral Intravenous Line                 Peripheral IV - Single Lumen 05/30/19 18 G Right Hand 3 days         Peripheral IV - Single Lumen 05/30/19 0847 22 G Right Wrist 2 days         Peripheral IV - Single Lumen 06/01/19 1256 20 G;1 3/4 in Right Forearm less than 1 day                Significant Labs:    CBC/Anemia Profile:  Recent Labs   Lab 06/01/19 0310 06/02/19  0432   WBC 9.17  9.17 6.97  6.97   HGB 8.5*  8.5* 9.0*  9.0*   HCT 26.3*  26.3* 28.5*  28.5*   *  116* 113*  113*   MCV 93  93 93  93   RDW 14.0  14.0 13.7  13.7        Chemistries:  Recent Labs   Lab 06/01/19 0310 06/01/19  1819 06/02/19  0432     140  --  137  137   K 3.5  3.5  --  4.0  4.0   *  113*  --  108  108   CO2 21*  21*  --  24  24   BUN 10  10  --  9  9   CREATININE 0.7  0.7  --  0.7  0.7   CALCIUM 7.5*  7.5*  7.5*  --  8.1*  8.1*  8.1*   ALBUMIN 2.2*  --  2.2*   PROT 4.9*  --  5.4*   BILITOT 0.3  --  0.3   ALKPHOS 53*  --  55   ALT 12  --  14   AST 35  --  37   MG 1.8 1.9 1.8   PHOS 1.9* 3.2 2.8       All pertinent labs within the past 24 hours have been reviewed.    Significant Imaging:  I have reviewed all pertinent imaging results/findings within the past 24 hours.

## 2019-06-02 NOTE — ASSESSMENT & PLAN NOTE
64 y.o. 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    Neuro:   - AAOx4  - pain control with morphine PCA, scheduled tylenol per G tube     Pulmonary:   - trach collar  - monitor respiratory status     Cardiac:   - HDS  - monitor     Renal:    - Andersen removed  - UOP good  - monitor UOP  - BUN/Cr 9/0.7, stable     ID:   - Afebrile  - WBC 7 from 9.2  - Perioperative unasyn     Hem/Onc:   - H/H 9/28.5, stable  - monitor H/H  - transfuse PRN     Endocrine:    - monitor BG     Fluids/Electrolytes/Nutrition/GI:   - NPO  - Replace electrolytes PRN  - hyperchloremic - changed mIVF to LR  - mIVF: LR @ 100 cc/hr  - Tolerating impact peptide TF's at 30 cc/hr, will advance today as tolerated     DISPO:  - stepdown per primary  - flap checks q2h  - advance tube feeds as tolerated  - OOB/PT/OT

## 2019-06-02 NOTE — PLAN OF CARE
Problem: Adult Inpatient Plan of Care  Goal: Plan of Care Review  Admit Date: 5/30/19    Admit Dx: Mandiblectomy, PEG exchange     6/1/19: PCA pump    Individualization:   1. Flap checks q 2 hrs  2. R leg neurovascular checks q 2      Past Medical History:  No date: Cancer    Past Surgical History:  No date: APPENDECTOMY  No date: CHOLECYSTECTOMY  5/30/2019: CREATION, FREE FLAP; Right      Comment:  Performed by Navdeep Dangelo MD at [facility] OR [address] [place] [address]  5/30/2019: DISSECTION, NECK; Right      Comment:  Performed by Wallace Santiago MD at [facility] OR [address] [place] [address]  5/30/2019: EGD, WITH PEG TUBE INSERTION      Comment:  Performed by Ottoniel Jimenez MD at [facility] OR [address] [place] [address]  No date: HYSTERECTOMY  No date: MANDIBLE SURGERY  5/30/2019: MANDIBULECTOMY; Right      Comment:  Performed by Wallace Santiago MD at [facility] OR [address] [place] [address]  5/30/2019: TRACHEOTOMY; N/A      Comment:  Performed by Navdeep Dangelo MD at [facility] OR [address] [place] [address]    Individualization:   1. Flap checks q 2 hrs  2. R leg neurovascular checks q 2           Outcome: Ongoing (interventions implemented as appropriate)  POC reviewed with Jennifer Andre and family at 1400. Pt demonstrated understanding. Questions and concerns addressed. No acute events today. Pt progressing toward goals. TF started. PCA morphine pump in use. Flap checks and neurovascular checks q 2 hrs. Pt in good spirits. Will continue to monitor. See flowsheets for full assessment and VS info.

## 2019-06-02 NOTE — PROGRESS NOTES
[facility]  Critical Care - Surgery  Progress Note    Patient Name: Jennifer Andre  MRN: [id number]  Admission Date: 5/30/2019  Hospital Length of Stay: 3 days  Code Status: Full Code  Attending Provider: Wallace Santiago MD  Primary Care Provider: Marcello Benavidez MD   Principal Problem: Squamous cell carcinoma    Subjective:     Hospital/ICU Course:  No notes on file    Interval History/Significant Events: No issues with flap overnight. H/H stable. UOP good overnight. Tolerating tube feeds at 30 cc/hr. Afebrile, vital signs stable.     Follow-up For: Procedure(s) (LRB):  MANDIBULECTOMY (Right)  DISSECTION, NECK (Right)  CREATION, FREE FLAP (Right)  EGD, WITH PEG TUBE INSERTION  TRACHEOTOMY (N/A)    Post-Operative Day: 2 Days Post-Op    Objective:     Vital Signs (Most Recent):  Temp: 97.7 °F (36.5 °C) (06/02/19 0300)  Pulse: 68 (06/02/19 0704)  Resp: 20 (06/02/19 0704)  BP: (!) 114/55 (06/02/19 0704)  SpO2: 100 % (06/02/19 0704) Vital Signs (24h Range):  Temp:  [97.7 °F (36.5 °C)-100.2 °F (37.9 °C)] 97.7 °F (36.5 °C)  Pulse:  [54-84] 68  Resp:  [5-37] 20  SpO2:  [90 %-100 %] 100 %  BP: ()/(46-65) 114/55     Weight: 82.3 kg (181 lb 7 oz)  Body mass index is 32.14 kg/m².      Intake/Output Summary (Last 24 hours) at 6/2/2019 0739  Last data filed at 6/2/2019 0600  Gross per 24 hour   Intake 3397.5 ml   Output 2286 ml   Net 1111.5 ml       Physical Exam   Constitutional: She is oriented to person, place, and time. She appears well-developed and well-nourished. No distress.   HENT:   Neck incision clean/dry/intact  R neck RENETTA in place draining serosanguinous   Eyes: No scleral icterus.   Cardiovascular: Normal rate and regular rhythm.   Pulmonary/Chest: Effort normal. No respiratory distress.   Abdominal: Soft. She exhibits no distension.   Musculoskeletal:   R thigh skin graft donor site clean/dry/intact  R leg RENETTA in place draining serosanguinous  R leg bandage and leg brace in place    Neurological: She is alert and oriented to person, place, and time.   Skin: Skin is warm and dry.   Psychiatric: She has a normal mood and affect. Her behavior is normal. Judgment and thought content normal.   Vitals reviewed.      Vents:  Oxygen Concentration (%): 35 (06/02/19 0343)    Lines/Drains/Airways     Drain                 Closed/Suction Drain 05/30/19 2024 Right Leg Bulb 15 Fr. 2 days         Closed/Suction Drain 05/30/19 2024 Right Neck Bulb 15 Fr. 2 days         Gastrostomy/Enterostomy 05/30/19 1140 Percutaneous endoscopic gastrostomy (PEG) LUQ feeding 2 days          Airway                 Surgical Airway 05/30/19 1120 Shiley Cuffed 2 days          Peripheral Intravenous Line                 Peripheral IV - Single Lumen 05/30/19 18 G Right Hand 3 days         Peripheral IV - Single Lumen 05/30/19 0847 22 G Right Wrist 2 days         Peripheral IV - Single Lumen 06/01/19 1256 20 G;1 3/4 in Right Forearm less than 1 day                Significant Labs:    CBC/Anemia Profile:  Recent Labs   Lab 06/01/19 0310 06/02/19  0432   WBC 9.17  9.17 6.97  6.97   HGB 8.5*  8.5* 9.0*  9.0*   HCT 26.3*  26.3* 28.5*  28.5*   *  116* 113*  113*   MCV 93  93 93  93   RDW 14.0  14.0 13.7  13.7        Chemistries:  Recent Labs   Lab 06/01/19 0310 06/01/19  1819 06/02/19  0432     140  --  137  137   K 3.5  3.5  --  4.0  4.0   *  113*  --  108  108   CO2 21*  21*  --  24  24   BUN 10  10  --  9  9   CREATININE 0.7  0.7  --  0.7  0.7   CALCIUM 7.5*  7.5*  7.5*  --  8.1*  8.1*  8.1*   ALBUMIN 2.2*  --  2.2*   PROT 4.9*  --  5.4*   BILITOT 0.3  --  0.3   ALKPHOS 53*  --  55   ALT 12  --  14   AST 35  --  37   MG 1.8 1.9 1.8   PHOS 1.9* 3.2 2.8       All pertinent labs within the past 24 hours have been reviewed.    Significant Imaging:  I have reviewed all pertinent imaging results/findings within the past 24 hours.    Assessment/Plan:     * Squamous cell carcinoma  64 y.o.  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    Neuro:   - AAOx4  - pain control with morphine PCA, scheduled tylenol per G tube     Pulmonary:   - trach collar  - monitor respiratory status     Cardiac:   - HDS  - monitor     Renal:    -  Andersen removed  - UOP good  - monitor UOP  - BUN/Cr 9/0.7, stable     ID:   -  Afebrile  - WBC 7 from 9.2  - Perioperative unasyn     Hem/Onc:   - H/H  9/28.5, stable  - monitor H/H  - transfuse PRN     Endocrine:    - monitor BG     Fluids/Electrolytes/Nutrition/GI:   - NPO  - Replace electrolytes PRN  - hyperchloremic - changed mIVF to LR  - mIVF: LR @ 100 cc/hr - discontinue  - Tolerating impact peptide TF's at 30 cc/hr, will advance today to 45 cc/hr as tolerated     DISPO:  - stepdown per primary  - flap checks q2h  - advance tube feeds as tolerated  - OOB/PT/OT         Critical care was time spent personally by me on the following activities: development of treatment plan with patient or surrogate and bedside caregivers, discussions with consultants, evaluation of patient's response to treatment, examination of patient, ordering and performing treatments and interventions, ordering and review of laboratory studies, ordering and review of radiographic studies, pulse oximetry, re-evaluation of patient's condition.  This critical care time did not overlap with that of any other provider or involve time for any procedures.     Chintan Jose MD  Critical Care - Surgery  [facility]

## 2019-06-02 NOTE — PROGRESS NOTES
[facility]  Otorhinolaryngology-Head & Neck Surgery  Progress Note    Subjective:     Post-Op Info:  Procedure(s) (LRB):  MANDIBULECTOMY (Right)  DISSECTION, NECK (Right)  CREATION, FREE FLAP (Right)  EGD, WITH PEG TUBE INSERTION  TRACHEOTOMY (N/A)   3 Days Post-Op  Hospital Day: 4     Interval History:  No acute events since surgery.  Flap checks have been stable.       Medications:  Continuous Infusions:   morphine       Scheduled Meds:   acetaminophen  650 mg Per G Tube Q6H    enoxaparin  40 mg Subcutaneous Daily     PRN Meds:naloxone     Review of patient's allergies indicates:   Allergen Reactions    Bactrim [sulfamethoxazole-trimethoprim]     Clindamycin     Keflex [cephalexin]      Tolerated Unasyn 05/31/2019    Lortab [hydrocodone-acetaminophen]     Tramadol      DIZZY AND NAUSEA, & VOMITTING     Objective:     Vital Signs (24h Range):  Temp:  [96.9 °F (36.1 °C)-99 °F (37.2 °C)] 98.9 °F (37.2 °C)  Pulse:  [54-84] 67  Resp:  [5-37] 18  SpO2:  [95 %-100 %] 97 %  BP: ()/(46-65) 110/53     Date 06/02/19 0700 - 06/03/19 0659   Shift 3466-7615 9383-6956 8036-9323 24 Hour Total   INTAKE   NG/ 135  475   Shift Total(mL/kg) 340(4.1) 135(1.6)  475(5.8)   OUTPUT   Urine(mL/kg/hr) 1200(1.8)   1200   Drains  12  12   Shift Total(mL/kg) 1200(14.6) 12(0.1)  1212(14.7)   Weight (kg) 82.3 82.3 82.3 82.3     Lines/Drains/Airways     Drain                 Gastrostomy/Enterostomy 05/30/19 1140 Percutaneous endoscopic gastrostomy (PEG) LUQ feeding 3 days         Closed/Suction Drain 05/30/19 2024 Right Leg Bulb 15 Fr. 2 days         Closed/Suction Drain 05/30/19 2024 Right Neck Bulb 15 Fr. 2 days          Airway                 Surgical Airway 05/30/19 1120 Shiley Cuffed 3 days          Peripheral Intravenous Line                 Peripheral IV - Single Lumen 05/30/19 0847 22 G Right Wrist 3 days         Peripheral IV - Single Lumen 05/30/19 18 G Right Hand 3 days         Peripheral IV -  Single Lumen 06/01/19 1256 20 G;1 3/4 in Right Forearm 1 day              Physical Exam     Awake, appropriate affect, follows commands  Normocephalic  Oral cavity with fibula paddle in place to R buccal mucosa/retromolar trigone.  Strong arterial and venous signal, color approximates leg skin.    Tongue mobile  Neck incision with staple line intact  Neck RENETTA drain with sanguinous output - 60cc/24h  Tracheostomy in place, 6 DCT, cuff deflated    R leg with boot in place, RENETTA with sanguinous output, 25cc       Significant Labs:  BMP:   Recent Labs   Lab 06/02/19  0432     102     108   CO2 24  24   BUN 9  9   CREATININE 0.7  0.7   CALCIUM 8.1*  8.1*  8.1*   MG 1.8     CBC:   Recent Labs   Lab 06/02/19  0432   WBC 6.97  6.97   RBC 3.05*  3.05*   HGB 9.0*  9.0*   HCT 28.5*  28.5*   *  113*   MCV 93  93   MCH 29.5  29.5   MCHC 31.6*  31.6*       Significant Diagnostics:  I have reviewed all pertinent imaging results/findings within the past 24 hours.    Assessment/Plan:     Malignant neoplasm of oral cavity  POD 3 - Composite resection right oral cavity and right mandible, right modified radical neck dissection, right fibular free flap reconstruction, split thickness skin graft from right thigh, tracheostomy, PEG placement.       OOB to chair  Routine fresh trach care  NPO  Advance TFs as tolerated  Continue Q2H flap checks and RLE neuro checks.    PT/OT/Nutrition consulted  Stepdown to floor today.         Isael vyas MD  Otorhinolaryngology-Head & Neck Surgery  [facility]

## 2019-06-02 NOTE — ASSESSMENT & PLAN NOTE
POD 3 - Composite resection right oral cavity and right mandible, right modified radical neck dissection, right fibular free flap reconstruction, split thickness skin graft from right thigh, tracheostomy, PEG placement.       OOB to chair  Routine fresh trach care  NPO  Advance TFs as tolerated  Continue Q2H flap checks and RLE neuro checks.    PT/OT/Nutrition consulted  Stepdown to floor today.

## 2019-06-02 NOTE — SUBJECTIVE & OBJECTIVE
Interval History:  No acute events since surgery.  Flap checks have been stable.       Medications:  Continuous Infusions:   morphine       Scheduled Meds:   acetaminophen  650 mg Per G Tube Q6H    enoxaparin  40 mg Subcutaneous Daily     PRN Meds:naloxone     Review of patient's allergies indicates:   Allergen Reactions    Bactrim [sulfamethoxazole-trimethoprim]     Clindamycin     Keflex [cephalexin]      Tolerated Unasyn 05/31/2019    Lortab [hydrocodone-acetaminophen]     Tramadol      DIZZY AND NAUSEA, & VOMITTING     Objective:     Vital Signs (24h Range):  Temp:  [96.9 °F (36.1 °C)-99 °F (37.2 °C)] 98.9 °F (37.2 °C)  Pulse:  [54-84] 67  Resp:  [5-37] 18  SpO2:  [95 %-100 %] 97 %  BP: ()/(46-65) 110/53     Date 06/02/19 0700 - 06/03/19 0659   Shift 6390-1232 7902-1311 0685-3361 24 Hour Total   INTAKE   NG/ 135  475   Shift Total(mL/kg) 340(4.1) 135(1.6)  475(5.8)   OUTPUT   Urine(mL/kg/hr) 1200(1.8)   1200   Drains  12  12   Shift Total(mL/kg) 1200(14.6) 12(0.1)  1212(14.7)   Weight (kg) 82.3 82.3 82.3 82.3     Lines/Drains/Airways     Drain                 Gastrostomy/Enterostomy 05/30/19 1140 Percutaneous endoscopic gastrostomy (PEG) LUQ feeding 3 days         Closed/Suction Drain 05/30/19 2024 Right Leg Bulb 15 Fr. 2 days         Closed/Suction Drain 05/30/19 2024 Right Neck Bulb 15 Fr. 2 days          Airway                 Surgical Airway 05/30/19 1120 Shiley Cuffed 3 days          Peripheral Intravenous Line                 Peripheral IV - Single Lumen 05/30/19 0847 22 G Right Wrist 3 days         Peripheral IV - Single Lumen 05/30/19 18 G Right Hand 3 days         Peripheral IV - Single Lumen 06/01/19 1256 20 G;1 3/4 in Right Forearm 1 day              Physical Exam     Awake, appropriate affect, follows commands  Normocephalic  Oral cavity with fibula paddle in place to R buccal mucosa/retromolar trigone.  Strong arterial and venous signal, color approximates leg skin.    Tongue  mobile  Neck incision with staple line intact  Neck RENETTA drain with sanguinous output - 60cc/24h  Tracheostomy in place, 6 DCT, cuff deflated    R leg with boot in place, RENETTA with sanguinous output, 25cc       Significant Labs:  BMP:   Recent Labs   Lab 06/02/19  0432     102     108   CO2 24  24   BUN 9  9   CREATININE 0.7  0.7   CALCIUM 8.1*  8.1*  8.1*   MG 1.8     CBC:   Recent Labs   Lab 06/02/19 0432   WBC 6.97  6.97   RBC 3.05*  3.05*   HGB 9.0*  9.0*   HCT 28.5*  28.5*   *  113*   MCV 93  93   MCH 29.5  29.5   MCHC 31.6*  31.6*       Significant Diagnostics:  I have reviewed all pertinent imaging results/findings within the past 24 hours.

## 2019-06-03 LAB
ABO + RH BLD: NORMAL
ALBUMIN SERPL BCP-MCNC: 2.1 G/DL (ref 3.5–5.2)
ALP SERPL-CCNC: 53 U/L (ref 55–135)
ALT SERPL W/O P-5'-P-CCNC: 16 U/L (ref 10–44)
ANION GAP SERPL CALC-SCNC: 5 MMOL/L (ref 8–16)
ANION GAP SERPL CALC-SCNC: 5 MMOL/L (ref 8–16)
AST SERPL-CCNC: 39 U/L (ref 10–40)
BASOPHILS # BLD AUTO: 0.03 K/UL (ref 0–0.2)
BASOPHILS # BLD AUTO: 0.03 K/UL (ref 0–0.2)
BASOPHILS NFR BLD: 0.5 % (ref 0–1.9)
BASOPHILS NFR BLD: 0.5 % (ref 0–1.9)
BILIRUB SERPL-MCNC: 0.2 MG/DL (ref 0.1–1)
BLD GP AB SCN CELLS X3 SERPL QL: NORMAL
BUN SERPL-MCNC: 13 MG/DL (ref 8–23)
BUN SERPL-MCNC: 13 MG/DL (ref 8–23)
CALCIUM SERPL-MCNC: 8.3 MG/DL (ref 8.7–10.5)
CHLORIDE SERPL-SCNC: 107 MMOL/L (ref 95–110)
CHLORIDE SERPL-SCNC: 107 MMOL/L (ref 95–110)
CO2 SERPL-SCNC: 24 MMOL/L (ref 23–29)
CO2 SERPL-SCNC: 24 MMOL/L (ref 23–29)
CREAT SERPL-MCNC: 0.6 MG/DL (ref 0.5–1.4)
CREAT SERPL-MCNC: 0.6 MG/DL (ref 0.5–1.4)
DIFFERENTIAL METHOD: ABNORMAL
DIFFERENTIAL METHOD: ABNORMAL
EOSINOPHIL # BLD AUTO: 0 K/UL (ref 0–0.5)
EOSINOPHIL # BLD AUTO: 0 K/UL (ref 0–0.5)
EOSINOPHIL NFR BLD: 0.2 % (ref 0–8)
EOSINOPHIL NFR BLD: 0.2 % (ref 0–8)
ERYTHROCYTE [DISTWIDTH] IN BLOOD BY AUTOMATED COUNT: 13.7 % (ref 11.5–14.5)
ERYTHROCYTE [DISTWIDTH] IN BLOOD BY AUTOMATED COUNT: 13.7 % (ref 11.5–14.5)
EST. GFR  (AFRICAN AMERICAN): >60 ML/MIN/1.73 M^2
EST. GFR  (AFRICAN AMERICAN): >60 ML/MIN/1.73 M^2
EST. GFR  (NON AFRICAN AMERICAN): >60 ML/MIN/1.73 M^2
EST. GFR  (NON AFRICAN AMERICAN): >60 ML/MIN/1.73 M^2
GLUCOSE SERPL-MCNC: 107 MG/DL (ref 70–110)
GLUCOSE SERPL-MCNC: 107 MG/DL (ref 70–110)
HCT VFR BLD AUTO: 24.3 % (ref 37–48.5)
HCT VFR BLD AUTO: 24.3 % (ref 37–48.5)
HGB BLD-MCNC: 7.9 G/DL (ref 12–16)
HGB BLD-MCNC: 7.9 G/DL (ref 12–16)
IMM GRANULOCYTES # BLD AUTO: 0.04 K/UL (ref 0–0.04)
IMM GRANULOCYTES # BLD AUTO: 0.04 K/UL (ref 0–0.04)
IMM GRANULOCYTES NFR BLD AUTO: 0.7 % (ref 0–0.5)
IMM GRANULOCYTES NFR BLD AUTO: 0.7 % (ref 0–0.5)
LYMPHOCYTES # BLD AUTO: 0.6 K/UL (ref 1–4.8)
LYMPHOCYTES # BLD AUTO: 0.6 K/UL (ref 1–4.8)
LYMPHOCYTES NFR BLD: 10.6 % (ref 18–48)
LYMPHOCYTES NFR BLD: 10.6 % (ref 18–48)
MAGNESIUM SERPL-MCNC: 2.3 MG/DL (ref 1.6–2.6)
MCH RBC QN AUTO: 29.4 PG (ref 27–31)
MCH RBC QN AUTO: 29.4 PG (ref 27–31)
MCHC RBC AUTO-ENTMCNC: 32.5 G/DL (ref 32–36)
MCHC RBC AUTO-ENTMCNC: 32.5 G/DL (ref 32–36)
MCV RBC AUTO: 90 FL (ref 82–98)
MCV RBC AUTO: 90 FL (ref 82–98)
MONOCYTES # BLD AUTO: 0.5 K/UL (ref 0.3–1)
MONOCYTES # BLD AUTO: 0.5 K/UL (ref 0.3–1)
MONOCYTES NFR BLD: 9.7 % (ref 4–15)
MONOCYTES NFR BLD: 9.7 % (ref 4–15)
NEUTROPHILS # BLD AUTO: 4.3 K/UL (ref 1.8–7.7)
NEUTROPHILS # BLD AUTO: 4.3 K/UL (ref 1.8–7.7)
NEUTROPHILS NFR BLD: 78.3 % (ref 38–73)
NEUTROPHILS NFR BLD: 78.3 % (ref 38–73)
NRBC BLD-RTO: 0 /100 WBC
NRBC BLD-RTO: 0 /100 WBC
PHOSPHATE SERPL-MCNC: 3.3 MG/DL (ref 2.7–4.5)
PLATELET # BLD AUTO: 122 K/UL (ref 150–350)
PLATELET # BLD AUTO: 122 K/UL (ref 150–350)
PMV BLD AUTO: 11.5 FL (ref 9.2–12.9)
PMV BLD AUTO: 11.5 FL (ref 9.2–12.9)
POTASSIUM SERPL-SCNC: 4.6 MMOL/L (ref 3.5–5.1)
POTASSIUM SERPL-SCNC: 4.6 MMOL/L (ref 3.5–5.1)
PROT SERPL-MCNC: 5.6 G/DL (ref 6–8.4)
RBC # BLD AUTO: 2.69 M/UL (ref 4–5.4)
RBC # BLD AUTO: 2.69 M/UL (ref 4–5.4)
SODIUM SERPL-SCNC: 136 MMOL/L (ref 136–145)
SODIUM SERPL-SCNC: 136 MMOL/L (ref 136–145)
WBC # BLD AUTO: 5.55 K/UL (ref 3.9–12.7)
WBC # BLD AUTO: 5.55 K/UL (ref 3.9–12.7)

## 2019-06-03 PROCEDURE — 85025 COMPLETE CBC W/AUTO DIFF WBC: CPT

## 2019-06-03 PROCEDURE — 25000003 PHARM REV CODE 250: Performed by: STUDENT IN AN ORGANIZED HEALTH CARE EDUCATION/TRAINING PROGRAM

## 2019-06-03 PROCEDURE — 63600175 PHARM REV CODE 636 W HCPCS: Performed by: STUDENT IN AN ORGANIZED HEALTH CARE EDUCATION/TRAINING PROGRAM

## 2019-06-03 PROCEDURE — 97112 NEUROMUSCULAR REEDUCATION: CPT

## 2019-06-03 PROCEDURE — 20000000 HC ICU ROOM

## 2019-06-03 PROCEDURE — 99233 PR SUBSEQUENT HOSPITAL CARE,LEVL III: ICD-10-PCS | Mod: GC,,, | Performed by: SURGERY

## 2019-06-03 PROCEDURE — 86901 BLOOD TYPING SEROLOGIC RH(D): CPT

## 2019-06-03 PROCEDURE — 27000221 HC OXYGEN, UP TO 24 HOURS

## 2019-06-03 PROCEDURE — 99900035 HC TECH TIME PER 15 MIN (STAT)

## 2019-06-03 PROCEDURE — 97530 THERAPEUTIC ACTIVITIES: CPT

## 2019-06-03 PROCEDURE — 99233 SBSQ HOSP IP/OBS HIGH 50: CPT | Mod: GC,,, | Performed by: SURGERY

## 2019-06-03 PROCEDURE — 94761 N-INVAS EAR/PLS OXIMETRY MLT: CPT

## 2019-06-03 PROCEDURE — 83735 ASSAY OF MAGNESIUM: CPT

## 2019-06-03 PROCEDURE — 80053 COMPREHEN METABOLIC PANEL: CPT

## 2019-06-03 PROCEDURE — 84100 ASSAY OF PHOSPHORUS: CPT

## 2019-06-03 RX ORDER — HYDROMORPHONE HYDROCHLORIDE 1 MG/ML
1 INJECTION, SOLUTION INTRAMUSCULAR; INTRAVENOUS; SUBCUTANEOUS
Status: DISCONTINUED | OUTPATIENT
Start: 2019-06-03 | End: 2019-06-11 | Stop reason: HOSPADM

## 2019-06-03 RX ORDER — PANTOPRAZOLE SODIUM 40 MG/1
40 FOR SUSPENSION ORAL DAILY
Status: DISCONTINUED | OUTPATIENT
Start: 2019-06-03 | End: 2019-06-11 | Stop reason: HOSPADM

## 2019-06-03 RX ORDER — OXYCODONE HCL 5 MG/5 ML
7.5 SOLUTION, ORAL ORAL EVERY 4 HOURS PRN
Status: DISCONTINUED | OUTPATIENT
Start: 2019-06-03 | End: 2019-06-10

## 2019-06-03 RX ADMIN — ACETAMINOPHEN 650 MG: 325 TABLET ORAL at 05:06

## 2019-06-03 RX ADMIN — ENOXAPARIN SODIUM 40 MG: 100 INJECTION SUBCUTANEOUS at 05:06

## 2019-06-03 RX ADMIN — ACETAMINOPHEN 650 MG: 325 TABLET ORAL at 11:06

## 2019-06-03 RX ADMIN — OXYCODONE HYDROCHLORIDE 7.5 MG: 5 SOLUTION ORAL at 06:06

## 2019-06-03 RX ADMIN — HYDROMORPHONE HYDROCHLORIDE 1 MG: 1 INJECTION, SOLUTION INTRAMUSCULAR; INTRAVENOUS; SUBCUTANEOUS at 11:06

## 2019-06-03 RX ADMIN — HYDROMORPHONE HYDROCHLORIDE 1 MG: 1 INJECTION, SOLUTION INTRAMUSCULAR; INTRAVENOUS; SUBCUTANEOUS at 02:06

## 2019-06-03 RX ADMIN — PANTOPRAZOLE SODIUM 40 MG: 40 GRANULE, DELAYED RELEASE ORAL at 11:06

## 2019-06-03 NOTE — SUBJECTIVE & OBJECTIVE
Interval History:  No acute events since surgery.  Flap checks have been stable.       Medications:  Continuous Infusions:    Scheduled Meds:   acetaminophen  650 mg Per G Tube Q6H    enoxaparin  40 mg Subcutaneous Daily     PRN Meds:HYDROmorphone, oxyCODONE     Review of patient's allergies indicates:   Allergen Reactions    Bactrim [sulfamethoxazole-trimethoprim]     Clindamycin     Keflex [cephalexin]      Tolerated Unasyn 05/31/2019    Lortab [hydrocodone-acetaminophen]     Tramadol      DIZZY AND NAUSEA, & VOMITTING     Objective:     Vital Signs (24h Range):  Temp:  [98.9 °F (37.2 °C)-99.9 °F (37.7 °C)] 99.1 °F (37.3 °C)  Pulse:  [59-91] 62  Resp:  [11-27] 19  SpO2:  [93 %-100 %] 93 %  BP: ()/(49-71) 115/53       Lines/Drains/Airways     Drain                 Closed/Suction Drain 05/30/19 2024 Right Leg Bulb 15 Fr. 3 days         Closed/Suction Drain 05/30/19 2024 Right Neck Bulb 15 Fr. 3 days         Gastrostomy/Enterostomy 05/30/19 1140 Percutaneous endoscopic gastrostomy (PEG) LUQ feeding 3 days          Airway                 Surgical Airway 05/30/19 1120 Shiley Cuffed 3 days          Peripheral Intravenous Line                 Peripheral IV - Single Lumen 05/30/19 18 G Right Hand 4 days         Peripheral IV - Single Lumen 06/01/19 1256 20 G;1 3/4 in Right Forearm 1 day              Physical Exam     Awake, appropriate affect, follows commands  Normocephalic  Oral cavity with fibula paddle in place to R buccal mucosa/retromolar trigone.  Strong arterial and venous signal, color approximates leg skin.    Tongue mobile  Neck incision with staple line intact  Neck RENETTA drain with serosanguinous output - 17cc/24h  Tracheostomy in place, 6 DCT, cuff deflated    R leg with boot in place, RENETTA with serosanguinous output, 10-cc       Significant Labs:  BMP:   Recent Labs   Lab 06/03/19  0310     107     107   CO2 24  24   BUN 13  13   CREATININE 0.6  0.6   CALCIUM 8.3*  8.3*  8.3*    MG 2.3     CBC:   Recent Labs   Lab 06/03/19  0310   WBC 5.55  5.55   RBC 2.69*  2.69*   HGB 7.9*  7.9*   HCT 24.3*  24.3*   *  122*   MCV 90  90   MCH 29.4  29.4   MCHC 32.5  32.5       Significant Diagnostics:  I have reviewed all pertinent imaging results/findings within the past 24 hours.

## 2019-06-03 NOTE — PROGRESS NOTES
[facility]  Critical Care - Surgery  Progress Note    Patient Name: Jennifer Andre  MRN: [id number]  Admission Date: 5/30/2019  Hospital Length of Stay: 4 days  Code Status: Full Code  Attending Provider: Wallace Santiago MD  Primary Care Provider: Marcello Benavidez MD   Principal Problem: Squamous cell carcinoma    Subjective:     Hospital/ICU Course:  No notes on file    Interval History/Significant Events: NAEON. Flap remains with good perfusion. Vitals stable. Adequate UOP. TF tolerated at 45.     Follow-up For: Procedure(s) (LRB):  MANDIBULECTOMY (Right)  DISSECTION, NECK (Right)  CREATION, FREE FLAP (Right)  EGD, WITH PEG TUBE INSERTION  TRACHEOTOMY (N/A)    Surgery date: 05/30/19    Objective:     Vital Signs (Most Recent):  Temp: 98.4 °F (36.9 °C) (06/03/19 0700)  Pulse: 65 (06/03/19 0828)  Resp: 18 (06/03/19 0828)  BP: (!) 119/56 (06/03/19 0700)  SpO2: 97 % (06/03/19 0828) Vital Signs (24h Range):  Temp:  [98.4 °F (36.9 °C)-99.9 °F (37.7 °C)] 98.4 °F (36.9 °C)  Pulse:  [59-91] 65  Resp:  [11-27] 18  SpO2:  [93 %-100 %] 97 %  BP: ()/(49-71) 119/56     Weight: 82.3 kg (181 lb 7 oz)  Body mass index is 32.14 kg/m².      Intake/Output Summary (Last 24 hours) at 6/3/2019 0932  Last data filed at 6/3/2019 0700  Gross per 24 hour   Intake 1924.5 ml   Output 1877 ml   Net 47.5 ml       Physical Exam   Constitutional: She is oriented to person, place, and time. She appears well-developed and well-nourished. No distress.   HENT:   Neck incision clean/dry/intact with staples  Flap with good cap refill  R neck RENETTA in place draining serosanguinous   Eyes: No scleral icterus.   Cardiovascular: Normal rate and regular rhythm.   Pulmonary/Chest: Effort normal. No respiratory distress.   Abdominal: Soft. She exhibits no distension.   Musculoskeletal:   R thigh skin graft donor site bloody underneath bandage, clean no signs of infection  R leg RENETTA in place draining serosanguinous  R leg bandage in  place   Neurological: She is alert and oriented to person, place, and time.   Skin: Skin is warm and dry.   Psychiatric: She has a normal mood and affect. Her behavior is normal. Judgment and thought content normal.   Vitals reviewed.      Vents:  Oxygen Concentration (%): 28 (06/03/19 0828)    Lines/Drains/Airways     Drain                 Closed/Suction Drain 05/30/19 2024 Right Leg Bulb 15 Fr. 3 days         Closed/Suction Drain 05/30/19 2024 Right Neck Bulb 15 Fr. 3 days         Gastrostomy/Enterostomy 05/30/19 1140 Percutaneous endoscopic gastrostomy (PEG) LUQ feeding 3 days          Airway                 Surgical Airway 05/30/19 1120 Shiley Cuffed 3 days          Peripheral Intravenous Line                 Peripheral IV - Single Lumen 05/30/19 18 G Right Hand 4 days         Peripheral IV - Single Lumen 06/01/19 1256 20 G;1 3/4 in Right Forearm 1 day                Significant Labs:    CBC/Anemia Profile:  Recent Labs   Lab 06/02/19  0432 06/03/19  0310   WBC 6.97  6.97 5.55  5.55   HGB 9.0*  9.0* 7.9*  7.9*   HCT 28.5*  28.5* 24.3*  24.3*   *  113* 122*  122*   MCV 93  93 90  90   RDW 13.7  13.7 13.7  13.7        Chemistries:  Recent Labs   Lab 06/01/19  1819 06/02/19  0432 06/03/19  0310   NA  --  137  137 136  136   K  --  4.0  4.0 4.6  4.6   CL  --  108  108 107  107   CO2  --  24  24 24  24   BUN  --  9  9 13  13   CREATININE  --  0.7  0.7 0.6  0.6   CALCIUM  --  8.1*  8.1*  8.1* 8.3*  8.3*  8.3*   ALBUMIN  --  2.2* 2.1*   PROT  --  5.4* 5.6*   BILITOT  --  0.3 0.2   ALKPHOS  --  55 53*   ALT  --  14 16   AST  --  37 39   MG 1.9 1.8 2.3   PHOS 3.2 2.8 3.3       All pertinent labs within the past 24 hours have been reviewed.    Significant Imaging:  I have reviewed all pertinent imaging results/findings within the past 24 hours.    Assessment/Plan:     * Squamous cell carcinoma  64 y.o. female w/ a significant PMHx of oropharyngeal SCC s/p segmental mandibulectomy +  chemo/RT who is now s/p Right fibula free flap w/ inset into mouth + right mandibular reconstruction + right condylar reconstruction , trach, PEG on 5/30.    Neuro:   - AAOx4  - pain control with morphine PCA, scheduled tylenol per G tube     Pulmonary:   - trach collar  - cont to monitor respiratory status     Cardiac:   - HDS  - monitor     Renal:    - Andersen removed  - UOP 2550  - monitor UOP  - BUN/Cr 13/0.6, stable     ID:   - Afebrile  - WBC 5.5  - Perioperative unasyn complete     Hem/Onc:   - H/H  7.9/24.3, stable  - monitor H/H  - transfuse PRN     Endocrine:    - monitor BG     Fluids/Electrolytes/Nutrition/GI:   - NPO  - Replace electrolytes PRN  - Tolerating impact peptide TF's at 45 cc/hr  - restart home protonix      DISPO:  - stepdown per primary, awaiting bed  - flap checks q2h  - restart home protonix  - OOB/PT/OT          Critical care was time spent personally by me on the following activities: development of treatment plan with patient or surrogate and bedside caregivers, discussions with consultants, evaluation of patient's response to treatment, examination of patient, ordering and performing treatments and interventions, ordering and review of laboratory studies, ordering and review of radiographic studies, pulse oximetry, re-evaluation of patient's condition.  This critical care time did not overlap with that of any other provider or involve time for any procedures.     Danie De Souza MD  Critical Care - Surgery  [facility]

## 2019-06-03 NOTE — PLAN OF CARE
Problem: Adult Inpatient Plan of Care  Goal: Plan of Care Review  Admit Date: 5/30/19    Admit Dx: Mandiblectomy, PEG exchange     6/1/19: PCA pump    Individualization:   1. Flap checks q 2 hrs  2. R leg neurovascular checks q 2      Past Medical History:  No date: Cancer    Past Surgical History:  No date: APPENDECTOMY  No date: CHOLECYSTECTOMY  5/30/2019: CREATION, FREE FLAP; Right      Comment:  Performed by Navdeep Dangelo MD at [facility] OR [address] [place] [address]  5/30/2019: DISSECTION, NECK; Right      Comment:  Performed by Wallace Santiago MD at [facility] OR [address] [place] [address]  5/30/2019: EGD, WITH PEG TUBE INSERTION      Comment:  Performed by Ottoniel Jimenez MD at [facility] OR [address] [place] [address]  No date: HYSTERECTOMY  No date: MANDIBLE SURGERY  5/30/2019: MANDIBULECTOMY; Right      Comment:  Performed by Wallace Santiago MD at [facility] OR [address] [place] [address]  5/30/2019: TRACHEOTOMY; N/A      Comment:  Performed by Navdeep Dangelo MD at [facility] OR [address] [place] [address]    Individualization:   1. Flap checks q 2 hrs  2. R leg neurovascular checks q 2           Outcome: Ongoing (interventions implemented as appropriate)  Patient free of falls/traumas/injuries. Plan of care discussed with patient and family.  Neuro: AAOx4, follows commands.  Cardiac: SR on telemetry with HR in 60s, SBP <160 and Maps >65.  Respiratory: Trach collar with O2 at 5L and 28% with SpO2 91-94%  GI/: Purewick in place with pt voiding spontaneously   Flap to right intraoral cavity pink, moist, and warm with a strong arterial pulse. Right lower extremity warm and pink with no numbness or tingling noted by patient, Pt able to move toes when prompted. Right thigh graft site covered by tegaderm with scant serosanguinous drainage. Neck incision with staples approximated and C/D/I. Possible stepping down today.

## 2019-06-03 NOTE — PLAN OF CARE
Problem: Adult Inpatient Plan of Care  Goal: Plan of Care Review  Outcome: Ongoing (interventions implemented as appropriate)  Pt O2 sat % on trach collar @ 5L and 28%. HR 60-80s NSR, MAP goal >65 met throughout the shift. AOx4, following commands, and moving all extremities well.  Generalized weakness noted. Worked with physical therapy this morning and sat up at side of bed and took a few steps. Unable to get all the way to the chair due to sever pain to Right lower extremity. No continuous gtts, morphine PCA discontinued this morning. Pain controlled with PRN dilauded. UOP adequate, purewick in place w/ no complications. Small amount of output from RENETTA drains. Repositioned Q2 to prevent skin breakdown. Q2 hr flap and neurovascular checks complete per nursing order w/ no abnormal findings. Plan to transfer patient to [facility] once bed becomes available. Plan of care reviewed with pt/family, all questions and concerns addressed. MD updated on current condition and vitals.  No new orders received, will continue to monitor. See flowsheet for further assessment/details.

## 2019-06-03 NOTE — PROGRESS NOTES
[facility]  Otorhinolaryngology-Head & Neck Surgery  Progress Note    Subjective:     Post-Op Info:  Procedure(s) (LRB):  MANDIBULECTOMY (Right)  DISSECTION, NECK (Right)  CREATION, FREE FLAP (Right)  EGD, WITH PEG TUBE INSERTION  TRACHEOTOMY (N/A)   4 Days Post-Op  Hospital Day: 5     Interval History:  No acute events since surgery.  Flap checks have been stable.       Medications:  Continuous Infusions:    Scheduled Meds:   acetaminophen  650 mg Per G Tube Q6H    enoxaparin  40 mg Subcutaneous Daily     PRN Meds:HYDROmorphone, oxyCODONE     Review of patient's allergies indicates:   Allergen Reactions    Bactrim [sulfamethoxazole-trimethoprim]     Clindamycin     Keflex [cephalexin]      Tolerated Unasyn 05/31/2019    Lortab [hydrocodone-acetaminophen]     Tramadol      DIZZY AND NAUSEA, & VOMITTING     Objective:     Vital Signs (24h Range):  Temp:  [98.9 °F (37.2 °C)-99.9 °F (37.7 °C)] 99.1 °F (37.3 °C)  Pulse:  [59-91] 62  Resp:  [11-27] 19  SpO2:  [93 %-100 %] 93 %  BP: ()/(49-71) 115/53       Lines/Drains/Airways     Drain                 Closed/Suction Drain 05/30/19 2024 Right Leg Bulb 15 Fr. 3 days         Closed/Suction Drain 05/30/19 2024 Right Neck Bulb 15 Fr. 3 days         Gastrostomy/Enterostomy 05/30/19 1140 Percutaneous endoscopic gastrostomy (PEG) LUQ feeding 3 days          Airway                 Surgical Airway 05/30/19 1120 Shiley Cuffed 3 days          Peripheral Intravenous Line                 Peripheral IV - Single Lumen 05/30/19 18 G Right Hand 4 days         Peripheral IV - Single Lumen 06/01/19 1256 20 G;1 3/4 in Right Forearm 1 day              Physical Exam     Awake, appropriate affect, follows commands  Normocephalic  Oral cavity with fibula paddle in place to R buccal mucosa/retromolar trigone.  Strong arterial and venous signal, color approximates leg skin.    Tongue mobile  Neck incision with staple line intact  Neck RENETTA drain with serosanguinous  output - 17cc/24h  Tracheostomy in place, 6 DCT, cuff deflated    R leg with boot in place, RENETTA with serosanguinous output, 10-cc       Significant Labs:  BMP:   Recent Labs   Lab 06/03/19  0310     107     107   CO2 24  24   BUN 13  13   CREATININE 0.6  0.6   CALCIUM 8.3*  8.3*  8.3*   MG 2.3     CBC:   Recent Labs   Lab 06/03/19  0310   WBC 5.55  5.55   RBC 2.69*  2.69*   HGB 7.9*  7.9*   HCT 24.3*  24.3*   *  122*   MCV 90  90   MCH 29.4  29.4   MCHC 32.5  32.5       Significant Diagnostics:  I have reviewed all pertinent imaging results/findings within the past 24 hours.    Assessment/Plan:     Malignant neoplasm of oral cavity  POD 4 - Composite resection right oral cavity and right mandible, right modified radical neck dissection, right fibular free flap reconstruction, split thickness skin graft from right thigh, tracheostomy, PEG placement.       OOB to chair  Routine fresh trach care, humidified O2 via trach collar (not T-piece please)  NPO  Advance TFs as tolerated  Continue Q2H flap checks and RLE neuro checks.    PT/OT/Nutrition consulted  Discontinue PCA, orders placed for enteral pain medication and IV breakthrough  Stepdown to floor today.         Reuben Ledesma MD  Otorhinolaryngology-Head & Neck Surgery  [facility]

## 2019-06-03 NOTE — PLAN OF CARE
Problem: Physical Therapy Goal  Goal: Physical Therapy Goal  Goals to be met by: 6/10/19     Patient will increase functional independence with mobility by performin. Supine to sit with Set-up [place]  2. Sit to supine with Set-up [place]  3. Sit to stand transfer with Minimal Assistance  4. Bed to chair transfer with Minimal Assistance using appropriate AD  5. Gait  x 50 feet with Minimal Assistance using appropriate AD.   6. Lower extremity exercise program x20-30 reps per handout, with independence to improve functional strength and endurance     Outcome: Ongoing (interventions implemented as appropriate)  Patient showed improved strength, but mobility is limited due to pain on the R foot with weight bearing and breathing deficit.

## 2019-06-03 NOTE — PLAN OF CARE
Patient still in ICU and not expected to discharge until next week.  Checked into the [facility] for family to stay while patient in hospital.  Grant at [facility] stated they did not have a room available this week.  Left message for patient's , Jose Alberto, that no room was available and left number for [facility] ([phone]), so that he can call for a room when ever patient comes into town for appointments in the future.  Will continue to follow for needs.       06/03/19 5867   Discharge Reassessment   Assessment Type Discharge Planning Reassessment   Discharge Plan A Home Health

## 2019-06-03 NOTE — PT/OT/SLP PROGRESS
Physical Therapy Treatment    Patient Name:  Jennifer Andre   MRN:  [id number]    Recommendations:     Discharge Recommendations:  nursing facility, skilled   Discharge Equipment Recommendations: (TBD, pending progress)   Barriers to discharge: Inaccessible home and Decreased caregiver support    Assessment:     Jennifer Andre is a 64 y.o. female admitted with a medical diagnosis of Squamous cell carcinoma.  She presents with the following impairments/functional limitations:  weakness, impaired endurance, impaired functional mobilty, impaired self care skills, gait instability, decreased lower extremity function, decreased ROM, impaired cardiopulmonary response to activity, pain, impaired skin, edema . Patient required time with mobility due to multiple medical lines and pain on the R LE. Patient showed fair strength with bed mobility and transfers, but was only able to take 4 steps to HOB due to increased breathing rate and to limit the pain on the R LE.    Rehab Prognosis: Good; patient would benefit from acute skilled PT services to address these deficits and reach maximum level of function.    Recent Surgery: Procedure(s) (LRB):  MANDIBULECTOMY (Right)  DISSECTION, NECK (Right)  CREATION, FREE FLAP (Right)  EGD, WITH PEG TUBE INSERTION  TRACHEOTOMY (N/A) 4 Days Post-Op    Plan:     During this hospitalization, patient to be seen 4 x/week to address the identified rehab impairments via gait training, therapeutic activities, therapeutic exercises, neuromuscular re-education and progress toward the following goals:    · Plan of Care Expires:  07/01/19    Subjective     Chief Complaint: fatigue and pain on the R foot.  Patient/Family Comments/goals: to get stronger.  Pain/Comfort:  · Pain Rating 1: (Did not rate, but pain only noted with weight bearing or pressure.)  · Location - Side 1: Right  · Location - Orientation 1: generalized  · Location 1: foot  · Pain Addressed 1: Pre-medicate for activity, Reposition,  Distraction, Cessation of Activity  · Pain Rating Post-Intervention 1: (Did not rate)      Objective:     Communicated with nsg prior to session.  Patient found HOB elevated with tracheostomy, SCD, telemetry, pulse ox (continuous), peripheral IV, PEG Tube, RENETTA drain, blood pressure cuff, bed alarm, PureWick upon PT entry to room.     General Precautions: Standard, fall   Orthopedic Precautions:N/A   Braces: (Orthopedic boot in room.)     Functional Mobility:  · Bed Mobility:     · Rolling Right: minimum assistance  · Scooting: moderate assistance and maximal assistance  · Supine to Sit: moderate assistance  · Sit to Supine: moderate assistance  · Transfers:     · Sit to Stand:  minimum assistance and moderate assistance with hand-held assist  · Gait: 4 side steps to HOB with min assistance and PTA for support.  · Balance: fair in sitting and standing.      AM-PAC 6 CLICK MOBILITY  Turning over in bed (including adjusting bedclothes, sheets and blankets)?: 3  Sitting down on and standing up from a chair with arms (e.g., wheelchair, bedside commode, etc.): 2  Moving from lying on back to sitting on the side of the bed?: 3  Moving to and from a bed to a chair (including a wheelchair)?: 2  Need to walk in hospital room?: 2  Climbing 3-5 steps with a railing?: 1  Basic Mobility Total Score: 13       Therapeutic Activities and Exercises:   Donned/Doffed gripping socks. Patient assisted to EOB and RN assisted managing multiple medical lines. Static sitting balance at EOB x 20 minutes with min assist, but progressing to SBA. Static standing balance 2x60 secs with min assistance, due to limited stance on the R foot. Patient assisted back to bed with RN and repositioned for comfort, with B heels elevated to prevent Decubitus.     Patient left HOB elevated with all lines intact, call button in reach and RN present..    GOALS:   Multidisciplinary Problems     Physical Therapy Goals        Problem: Physical Therapy Goal    Goal  Priority Disciplines Outcome Goal Variances Interventions   Physical Therapy Goal     PT, PT/OT Ongoing (interventions implemented as appropriate)     Description:  Goals to be met by: 6/10/19     Patient will increase functional independence with mobility by performin. Supine to sit with Set-up [place]  2. Sit to supine with Set-up [place]  3. Sit to stand transfer with Minimal Assistance  4. Bed to chair transfer with Minimal Assistance using appropriate AD  5. Gait  x 50 feet with Minimal Assistance using appropriate AD.   6. Lower extremity exercise program x20-30 reps per handout, with independence to improve functional strength and endurance                      Time Tracking:     PT Received On: 19  PT Start Time: 1044     PT Stop Time: 1112  PT Total Time (min): 28 min     Billable Minutes: Therapeutic Activity 14 and Neuromuscular Re-education 14    Treatment Type: Treatment  PT/PTA: PTA     PTA Visit Number: Fabian     Jj Cummings, RODO  2019

## 2019-06-03 NOTE — ASSESSMENT & PLAN NOTE
POD 4 - Composite resection right oral cavity and right mandible, right modified radical neck dissection, right fibular free flap reconstruction, split thickness skin graft from right thigh, tracheostomy, PEG placement.       OOB to chair  Routine fresh trach care, humidified O2 via trach collar (not T-piece please)  NPO  Advance TFs as tolerated  Continue Q2H flap checks and RLE neuro checks.    PT/OT/Nutrition consulted  Discontinue PCA, orders placed for enteral pain medication and IV breakthrough  Stepdown to floor today.

## 2019-06-03 NOTE — ASSESSMENT & PLAN NOTE
64 y.o. 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    Neuro:   - AAOx4  - pain control with morphine PCA, scheduled tylenol per G tube     Pulmonary:   - trach collar  - cont to monitor respiratory status     Cardiac:   - HDS  - monitor     Renal:    - Andersen removed  - UOP 2550  - monitor UOP  - BUN/Cr 13/0.6, stable     ID:   - Afebrile  - WBC 5.5  - Perioperative unasyn complete     Hem/Onc:   - H/H 7.9/24.3, stable  - monitor H/H  - transfuse PRN     Endocrine:    - monitor BG     Fluids/Electrolytes/Nutrition/GI:   - NPO  - Replace electrolytes PRN  - Tolerating impact peptide TF's at 45 cc/hr  - restart home protonix      DISPO:  - stepdown per primary, awaiting bed  - flap checks q2h  - restart home protonix  - OOB/PT/OT

## 2019-06-03 NOTE — SUBJECTIVE & OBJECTIVE
Interval History/Significant Events: NAEON. Flap remains with good perfusion. Vitals stable. Adequate UOP. TF tolerated at 45.     Follow-up For: Procedure(s) (LRB):  MANDIBULECTOMY (Right)  DISSECTION, NECK (Right)  CREATION, FREE FLAP (Right)  EGD, WITH PEG TUBE INSERTION  TRACHEOTOMY (N/A)    Surgery date: 05/30/19    Objective:     Vital Signs (Most Recent):  Temp: 98.4 °F (36.9 °C) (06/03/19 0700)  Pulse: 65 (06/03/19 0828)  Resp: 18 (06/03/19 0828)  BP: (!) 119/56 (06/03/19 0700)  SpO2: 97 % (06/03/19 0828) Vital Signs (24h Range):  Temp:  [98.4 °F (36.9 °C)-99.9 °F (37.7 °C)] 98.4 °F (36.9 °C)  Pulse:  [59-91] 65  Resp:  [11-27] 18  SpO2:  [93 %-100 %] 97 %  BP: ()/(49-71) 119/56     Weight: 82.3 kg (181 lb 7 oz)  Body mass index is 32.14 kg/m².      Intake/Output Summary (Last 24 hours) at 6/3/2019 0932  Last data filed at 6/3/2019 0700  Gross per 24 hour   Intake 1924.5 ml   Output 1877 ml   Net 47.5 ml       Physical Exam   Constitutional: She is oriented to person, place, and time. She appears well-developed and well-nourished. No distress.   HENT:   Neck incision clean/dry/intact with staples  Flap with good cap refill  R neck RENETTA in place draining serosanguinous   Eyes: No scleral icterus.   Cardiovascular: Normal rate and regular rhythm.   Pulmonary/Chest: Effort normal. No respiratory distress.   Abdominal: Soft. She exhibits no distension.   Musculoskeletal:   R thigh skin graft donor site bloody underneath bandage, clean no signs of infection  R leg RENETTA in place draining serosanguinous  R leg bandage in place   Neurological: She is alert and oriented to person, place, and time.   Skin: Skin is warm and dry.   Psychiatric: She has a normal mood and affect. Her behavior is normal. Judgment and thought content normal.   Vitals reviewed.      Vents:  Oxygen Concentration (%): 28 (06/03/19 0828)    Lines/Drains/Airways     Drain                 Closed/Suction Drain 05/30/19 2024 Right Leg Bulb 15 Fr.  3 days         Closed/Suction Drain 05/30/19 2024 Right Neck Bulb 15 Fr. 3 days         Gastrostomy/Enterostomy 05/30/19 1140 Percutaneous endoscopic gastrostomy (PEG) LUQ feeding 3 days          Airway                 Surgical Airway 05/30/19 1120 Shiley Cuffed 3 days          Peripheral Intravenous Line                 Peripheral IV - Single Lumen 05/30/19 18 G Right Hand 4 days         Peripheral IV - Single Lumen 06/01/19 1256 20 G;1 3/4 in Right Forearm 1 day                Significant Labs:    CBC/Anemia Profile:  Recent Labs   Lab 06/02/19 0432 06/03/19  0310   WBC 6.97  6.97 5.55  5.55   HGB 9.0*  9.0* 7.9*  7.9*   HCT 28.5*  28.5* 24.3*  24.3*   *  113* 122*  122*   MCV 93  93 90  90   RDW 13.7  13.7 13.7  13.7        Chemistries:  Recent Labs   Lab 06/01/19  1819 06/02/19 0432 06/03/19 0310   NA  --  137  137 136  136   K  --  4.0  4.0 4.6  4.6   CL  --  108  108 107  107   CO2  --  24  24 24  24   BUN  --  9  9 13  13   CREATININE  --  0.7  0.7 0.6  0.6   CALCIUM  --  8.1*  8.1*  8.1* 8.3*  8.3*  8.3*   ALBUMIN  --  2.2* 2.1*   PROT  --  5.4* 5.6*   BILITOT  --  0.3 0.2   ALKPHOS  --  55 53*   ALT  --  14 16   AST  --  37 39   MG 1.9 1.8 2.3   PHOS 3.2 2.8 3.3       All pertinent labs within the past 24 hours have been reviewed.    Significant Imaging:  I have reviewed all pertinent imaging results/findings within the past 24 hours.

## 2019-06-04 LAB
ALBUMIN SERPL BCP-MCNC: 2.2 G/DL (ref 3.5–5.2)
ALP SERPL-CCNC: 86 U/L (ref 55–135)
ALT SERPL W/O P-5'-P-CCNC: 30 U/L (ref 10–44)
ANION GAP SERPL CALC-SCNC: 6 MMOL/L (ref 8–16)
AST SERPL-CCNC: 45 U/L (ref 10–40)
BASOPHILS # BLD AUTO: 0.04 K/UL (ref 0–0.2)
BASOPHILS NFR BLD: 0.7 % (ref 0–1.9)
BILIRUB SERPL-MCNC: 0.2 MG/DL (ref 0.1–1)
BUN SERPL-MCNC: 17 MG/DL (ref 8–23)
CALCIUM SERPL-MCNC: 8.9 MG/DL (ref 8.7–10.5)
CALCIUM SERPL-MCNC: 8.9 MG/DL (ref 8.7–10.5)
CHLORIDE SERPL-SCNC: 107 MMOL/L (ref 95–110)
CO2 SERPL-SCNC: 24 MMOL/L (ref 23–29)
CREAT SERPL-MCNC: 0.6 MG/DL (ref 0.5–1.4)
DIFFERENTIAL METHOD: ABNORMAL
EOSINOPHIL # BLD AUTO: 0 K/UL (ref 0–0.5)
EOSINOPHIL NFR BLD: 0.2 % (ref 0–8)
ERYTHROCYTE [DISTWIDTH] IN BLOOD BY AUTOMATED COUNT: 13.9 % (ref 11.5–14.5)
EST. GFR  (AFRICAN AMERICAN): >60 ML/MIN/1.73 M^2
EST. GFR  (NON AFRICAN AMERICAN): >60 ML/MIN/1.73 M^2
GLUCOSE SERPL-MCNC: 109 MG/DL (ref 70–110)
HCT VFR BLD AUTO: 30.1 % (ref 37–48.5)
HGB BLD-MCNC: 9.7 G/DL (ref 12–16)
IMM GRANULOCYTES # BLD AUTO: 0.05 K/UL (ref 0–0.04)
IMM GRANULOCYTES NFR BLD AUTO: 0.8 % (ref 0–0.5)
LYMPHOCYTES # BLD AUTO: 0.8 K/UL (ref 1–4.8)
LYMPHOCYTES NFR BLD: 13.2 % (ref 18–48)
MAGNESIUM SERPL-MCNC: 1.7 MG/DL (ref 1.6–2.6)
MCH RBC QN AUTO: 29.9 PG (ref 27–31)
MCHC RBC AUTO-ENTMCNC: 32.2 G/DL (ref 32–36)
MCV RBC AUTO: 93 FL (ref 82–98)
MONOCYTES # BLD AUTO: 0.6 K/UL (ref 0.3–1)
MONOCYTES NFR BLD: 10.8 % (ref 4–15)
NEUTROPHILS # BLD AUTO: 4.4 K/UL (ref 1.8–7.7)
NEUTROPHILS NFR BLD: 74.3 % (ref 38–73)
NRBC BLD-RTO: 0 /100 WBC
PHOSPHATE SERPL-MCNC: 3.7 MG/DL (ref 2.7–4.5)
PLATELET # BLD AUTO: 169 K/UL (ref 150–350)
PMV BLD AUTO: 11.1 FL (ref 9.2–12.9)
POTASSIUM SERPL-SCNC: 3.6 MMOL/L (ref 3.5–5.1)
PROT SERPL-MCNC: 5.8 G/DL (ref 6–8.4)
RBC # BLD AUTO: 3.24 M/UL (ref 4–5.4)
SODIUM SERPL-SCNC: 137 MMOL/L (ref 136–145)
WBC # BLD AUTO: 5.9 K/UL (ref 3.9–12.7)

## 2019-06-04 PROCEDURE — 99900022

## 2019-06-04 PROCEDURE — 85025 COMPLETE CBC W/AUTO DIFF WBC: CPT

## 2019-06-04 PROCEDURE — 94761 N-INVAS EAR/PLS OXIMETRY MLT: CPT

## 2019-06-04 PROCEDURE — 27000221 HC OXYGEN, UP TO 24 HOURS

## 2019-06-04 PROCEDURE — 99900026 HC AIRWAY MAINTENANCE (STAT)

## 2019-06-04 PROCEDURE — 83735 ASSAY OF MAGNESIUM: CPT

## 2019-06-04 PROCEDURE — 27100108

## 2019-06-04 PROCEDURE — 20000000 HC ICU ROOM

## 2019-06-04 PROCEDURE — 63600175 PHARM REV CODE 636 W HCPCS: Performed by: STUDENT IN AN ORGANIZED HEALTH CARE EDUCATION/TRAINING PROGRAM

## 2019-06-04 PROCEDURE — 97535 SELF CARE MNGMENT TRAINING: CPT

## 2019-06-04 PROCEDURE — 99900035 HC TECH TIME PER 15 MIN (STAT)

## 2019-06-04 PROCEDURE — 25000003 PHARM REV CODE 250: Performed by: STUDENT IN AN ORGANIZED HEALTH CARE EDUCATION/TRAINING PROGRAM

## 2019-06-04 PROCEDURE — 97530 THERAPEUTIC ACTIVITIES: CPT

## 2019-06-04 PROCEDURE — 63600175 PHARM REV CODE 636 W HCPCS

## 2019-06-04 PROCEDURE — 80053 COMPREHEN METABOLIC PANEL: CPT

## 2019-06-04 PROCEDURE — 99233 PR SUBSEQUENT HOSPITAL CARE,LEVL III: ICD-10-PCS | Mod: GC,,, | Performed by: SURGERY

## 2019-06-04 PROCEDURE — 84100 ASSAY OF PHOSPHORUS: CPT

## 2019-06-04 PROCEDURE — 99233 SBSQ HOSP IP/OBS HIGH 50: CPT | Mod: GC,,, | Performed by: SURGERY

## 2019-06-04 RX ORDER — ONDANSETRON 2 MG/ML
INJECTION INTRAMUSCULAR; INTRAVENOUS
Status: COMPLETED
Start: 2019-06-04 | End: 2019-06-04

## 2019-06-04 RX ORDER — ONDANSETRON 2 MG/ML
4 INJECTION INTRAMUSCULAR; INTRAVENOUS EVERY 6 HOURS PRN
Status: DISCONTINUED | OUTPATIENT
Start: 2019-06-04 | End: 2019-06-11 | Stop reason: HOSPADM

## 2019-06-04 RX ORDER — SODIUM CITRATE AND CITRIC ACID MONOHYDRATE 334; 500 MG/5ML; MG/5ML
15 SOLUTION ORAL ONCE
Status: COMPLETED | OUTPATIENT
Start: 2019-06-04 | End: 2019-06-04

## 2019-06-04 RX ADMIN — OXYCODONE HYDROCHLORIDE 7.5 MG: 5 SOLUTION ORAL at 05:06

## 2019-06-04 RX ADMIN — ENOXAPARIN SODIUM 40 MG: 100 INJECTION SUBCUTANEOUS at 05:06

## 2019-06-04 RX ADMIN — OXYCODONE HYDROCHLORIDE 7.5 MG: 5 SOLUTION ORAL at 10:06

## 2019-06-04 RX ADMIN — ACETAMINOPHEN 650 MG: 325 TABLET ORAL at 05:06

## 2019-06-04 RX ADMIN — SODIUM CITRATE AND CITRIC ACID MONOHYDRATE 15 ML: 500; 334 SOLUTION ORAL at 02:06

## 2019-06-04 RX ADMIN — ONDANSETRON 4 MG: 2 INJECTION INTRAMUSCULAR; INTRAVENOUS at 09:06

## 2019-06-04 RX ADMIN — OXYCODONE HYDROCHLORIDE 7.5 MG: 5 SOLUTION ORAL at 09:06

## 2019-06-04 RX ADMIN — PANTOPRAZOLE SODIUM 40 MG: 40 GRANULE, DELAYED RELEASE ORAL at 09:06

## 2019-06-04 RX ADMIN — ACETAMINOPHEN 650 MG: 325 TABLET ORAL at 12:06

## 2019-06-04 RX ADMIN — ACETAMINOPHEN 650 MG: 325 TABLET ORAL at 11:06

## 2019-06-04 NOTE — ASSESSMENT & PLAN NOTE
64 y.o. 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    Neuro:   - AAOx4  - pain control with morphine PCA, scheduled tylenol per G tube     Pulmonary:   - trach collar  - cont to monitor respiratory status     Cardiac:   - HDS  - monitor     Renal:    - Andersen removed  - UOP 1500  - monitor UOP  - BUN/Cr 17/0.6, stable     ID:   - Afebrile  - WBC 5.9  - Perioperative unasyn complete     Hem/Onc:   - H/H 9.7/30.1, stable  - monitor H/H  - transfuse PRN     Endocrine:    - monitor BG     Fluids/Electrolytes/Nutrition/GI:   - NPO  - Replace electrolytes PRN  - Tolerating impact peptide TF's at 45 cc/hr  - restarted home protonix      DISPO:  - stepdown per primary, awaiting bed  - flap checks q2h  - OOB/PT/OT

## 2019-06-04 NOTE — PROGRESS NOTES
[facility]  Critical Care - Surgery  Progress Note    Patient Name: Jennifer Andre  MRN: [id number]  Admission Date: 5/30/2019  Hospital Length of Stay: 5 days  Code Status: Full Code  Attending Provider: Wallace Santiago MD  Primary Care Provider: Marcello Benavidez MD   Principal Problem: Squamous cell carcinoma    Subjective:     Hospital/ICU Course:  No notes on file    Interval History/Significant Events: NAEON. Vitals stable. Tolerated tube feeds.     Follow-up For: Procedure(s) (LRB):  MANDIBULECTOMY (Right)  DISSECTION, NECK (Right)  CREATION, FREE FLAP (Right)  EGD, WITH PEG TUBE INSERTION  TRACHEOTOMY (N/A)    Surgery date: 05/30/19    Objective:     Vital Signs (Most Recent):  Temp: 97.8 °F (36.6 °C) (06/04/19 0700)  Pulse: 69 (06/04/19 0815)  Resp: 20 (06/04/19 0815)  BP: (!) 110/56 (06/04/19 0800)  SpO2: (!) 94 % (06/04/19 0815) Vital Signs (24h Range):  Temp:  [97.8 °F (36.6 °C)-99.4 °F (37.4 °C)] 97.8 °F (36.6 °C)  Pulse:  [61-96] 69  Resp:  [10-43] 20  SpO2:  [91 %-100 %] 94 %  BP: ()/(53-61) 110/56     Weight: 82.3 kg (181 lb 7 oz)  Body mass index is 32.14 kg/m².      Intake/Output Summary (Last 24 hours) at 6/4/2019 1015  Last data filed at 6/4/2019 0800  Gross per 24 hour   Intake 995 ml   Output 1520 ml   Net -525 ml       Physical Exam   Constitutional: She is oriented to person, place, and time. She appears well-developed and well-nourished. No distress.   HENT:   Neck incision clean/dry/intact with staples  Flap with good cap refill  R neck RENETTA in place draining serosanguinous   Eyes: No scleral icterus.   Cardiovascular: Normal rate and regular rhythm.   Pulmonary/Chest: Effort normal. No respiratory distress.   Abdominal: Soft. She exhibits no distension.   Musculoskeletal:   R thigh skin graft donor site bloody underneath bandage, clean no signs of infection  R leg RENETTA in place draining serosanguinous  R leg bandage in place   Neurological: She is alert and oriented  to person, place, and time.   Skin: Skin is warm and dry.   Psychiatric: She has a normal mood and affect. Thought content normal.   Vitals reviewed.      Vents:  Oxygen Concentration (%): 28 (06/04/19 0741)    Lines/Drains/Airways     Drain                 Closed/Suction Drain 05/30/19 2024 Right Leg Bulb 15 Fr. 4 days         Closed/Suction Drain 05/30/19 2024 Right Neck Bulb 15 Fr. 4 days         Gastrostomy/Enterostomy 05/30/19 1140 Percutaneous endoscopic gastrostomy (PEG) LUQ feeding 4 days    Female External Urinary Catheter 06/03/19 1100 less than 1 day          Airway                 Surgical Airway 05/30/19 1120 Shiley Cuffed 4 days          Peripheral Intravenous Line                 Peripheral IV - Single Lumen 05/30/19 18 G Right Hand 5 days         Peripheral IV - Single Lumen 06/01/19 1256 20 G;1 3/4 in Right Forearm 2 days                Significant Labs:    CBC/Anemia Profile:  Recent Labs   Lab 06/03/19 0310 06/04/19 0317   WBC 5.55  5.55 5.90   HGB 7.9*  7.9* 9.7*   HCT 24.3*  24.3* 30.1*   *  122* 169   MCV 90  90 93   RDW 13.7  13.7 13.9        Chemistries:  Recent Labs   Lab 06/03/19 0310 06/04/19 0317     136 137   K 4.6  4.6 3.6     107 107   CO2 24  24 24   BUN 13  13 17   CREATININE 0.6  0.6 0.6   CALCIUM 8.3*  8.3*  8.3* 8.9  8.9   ALBUMIN 2.1* 2.2*   PROT 5.6* 5.8*   BILITOT 0.2 0.2   ALKPHOS 53* 86   ALT 16 30   AST 39 45*   MG 2.3 1.7   PHOS 3.3 3.7       All pertinent labs within the past 24 hours have been reviewed.    Significant Imaging:  I have reviewed all pertinent imaging results/findings within the past 24 hours.    Assessment/Plan:     * Squamous cell carcinoma  64 y.o. 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    Neuro:   - AAOx4  - pain control  with morphine PCA, scheduled tylenol per G tube     Pulmonary:   - trach collar  - cont to monitor respiratory status     Cardiac:   - HDS  - monitor     Renal:    - Andersen removed  - UOP 1500  - monitor UOP  - BUN/Cr 17/0.6, stable     ID:   - Afebrile  - WBC 5.9  - Perioperative unasyn complete     Hem/Onc:   - H/H  9.7/30.1, stable  - monitor H/H  - transfuse PRN     Endocrine:    - monitor BG     Fluids/Electrolytes/Nutrition/GI:   - NPO  - Replace electrolytes PRN  - Tolerating impact peptide TF's at 45 cc/hr  - restarted home protonix      DISPO:  - stepdown per primary, awaiting bed  - flap checks q2h  - OOB/PT/OT          Critical care was time spent personally by me on the following activities: development of treatment plan with patient or surrogate and bedside caregivers, discussions with consultants, evaluation of patient's response to treatment, examination of patient, ordering and performing treatments and interventions, ordering and review of laboratory studies, ordering and review of radiographic studies, pulse oximetry, re-evaluation of patient's condition.  This critical care time did not overlap with that of any other provider or involve time for any procedures.     Danie De Souza MD  Critical Care - Surgery  [facility]

## 2019-06-04 NOTE — PLAN OF CARE
Problem: Occupational Therapy Goal  Goal: Occupational Therapy Goal  Goals to be met by: 6/10/19    Patient will increase functional independence with ADLs by performing:    UE Dressing with Stand-by Assistance.  LE Dressing with Moderate Assistance.  Grooming while standing at sink with Minimal Assistance.  Toileting from bedside commode with Moderate Assistance for hygiene and clothing management.   Supine to sit with Minimal Assistance.  Stand pivot transfers with Moderate Assistance in prep for performance of functional activity.  Toilet transfer to bedside commodper handout, with independence.     Outcome: Ongoing (interventions implemented as appropriate)  The above goals remain appropriate. ALVIN Campbell  6/4/2019

## 2019-06-04 NOTE — PT/OT/SLP PROGRESS
Occupational Therapy   Treatment    Name: Jennifer Andre  MRN: [id number]  Admitting Diagnosis:  Squamous cell carcinoma s/p resection, trach, PEG, mandibulectomy, R fibula free flap 5 Days Post-Op    Recommendations:     Discharge Recommendations: (SS-SNF )  Discharge Equipment Recommendations:  (TBD closer to d/c pending progress)  Barriers to discharge:  None    Assessment:     Jennifer Andre is a 64 y.o. female with a medical diagnosis of Squamous cell carcinoma.  Performance deficits affecting function are weakness, impaired self care skills, impaired balance, impaired functional mobilty, impaired endurance, gait instability, decreased lower extremity function, decreased safety awareness, impaired cardiopulmonary response to activity.     Rehab Prognosis:  Good; patient would benefit from acute skilled OT services to address these deficits and reach maximum level of function.       Plan:     Patient to be seen 4 x/week to address the above listed problems via therapeutic activities, self-care/home management, therapeutic exercises  · Plan of Care Expires: 06/30/19  · Plan of Care Reviewed with: patient, spouse    Subjective     Pain/Comfort:  · Pain Rating 1: 0/10  · Pain Rating Post-Intervention 1: 0/10    Objective:     Communicated with: RN prior to session.  Patient found up in chair with blood pressure cuff, tracheostomy, pulse ox (continuous), telemetry, PEG Tube, RENETTA drain upon OT entry to room.    General Precautions: Standard, fall   Orthopedic Precautions:N/A   Braces: (R Darco boot per pt)     Occupational Performance:     Bed Mobility:    · NT     Functional Mobility/Transfers:  · Patient completed Sit <> Stand Transfer with contact guard assistance  with  rolling walker x 2 trials from b/s chair and Minimal A x 1 trial from b/s chair  · Functional Mobility: CGA around room during ADL using RW x ~20 feet    Activities of Daily Living:  · Grooming: minimum assistance seated for combing knotted  hair  · Upper Body Dressing: minimum assistance donning back gown  · Lower Body Dressing: maximal assistance donning R Darco boot and L sock  · Toileting: minimum assistance for pericare in standing      [facility] 6 Click ADL: 14    Treatment & Education:  Pt ed on OT POC  Pt ed on safe transfers using RW during transfers and standing ADL    Patient left up in chair with all lines intactEducation:  , call button within reach and nurse notified    GOALS:   Multidisciplinary Problems     Occupational Therapy Goals        Problem: Occupational Therapy Goal    Goal Priority Disciplines Outcome Interventions   Occupational Therapy Goal     OT, PT/OT Ongoing (interventions implemented as appropriate)    Description:  Goals to be met by: 6/10/19    Patient will increase functional independence with ADLs by performing:    UE Dressing with Stand-by Assistance.  LE Dressing with Moderate Assistance.  Grooming while standing at sink with Minimal Assistance.  Toileting from bedside commode with Moderate Assistance for hygiene and clothing management.   Supine to sit with Minimal Assistance.  Stand pivot transfers with Moderate Assistance in prep for performance of functional activity.  Toilet transfer to bedside commodper handout, with independence.                      Time Tracking:     OT Date of Treatment: 06/04/19  OT Start Time: 0949  OT Stop Time: 1027  OT Total Time (min): 38 min    Billable Minutes:Self Care/Home Management 23  Therapeutic Activity 15    ALVIN Campbell  6/4/2019

## 2019-06-04 NOTE — PLAN OF CARE
"Problem: Adult Inpatient Plan of Care  Goal: Plan of Care Review  Outcome: Ongoing (interventions implemented as appropriate)  Dx: Squamous cell carcinoma    Shift Events: OOB in chair x 11 hrs. Tolerating trach collar 5L, 28%. TFs stopped. Transitioning to bolus feeds. Flap checks q2h. Neurovascular checks q2h. Transfer orders in place. Waiting for a bed to be available.      Neuro: AAO x4    Vital Signs: /60 (BP Location: Right arm, Patient Position: Lying)   Pulse 71   Temp 99.6 °F (37.6 °C) (Axillary)   Resp 19   Ht 5' 3" (1.6 m)   Wt 82.3 kg (181 lb 7 oz)   SpO2 (!) 94%   Breastfeeding? No   BMI 32.14 kg/m²     Diet: Tube Feeds    Urine Output: voids spontaneously via commode.     Drains: RENETTA drains x 2. Minimal output.             "

## 2019-06-04 NOTE — SUBJECTIVE & OBJECTIVE
Interval History/Significant Events: NAEON. Vitals stable. Tolerated tube feeds.     Follow-up For: Procedure(s) (LRB):  MANDIBULECTOMY (Right)  DISSECTION, NECK (Right)  CREATION, FREE FLAP (Right)  EGD, WITH PEG TUBE INSERTION  TRACHEOTOMY (N/A)    Surgery date: 05/30/19    Objective:     Vital Signs (Most Recent):  Temp: 97.8 °F (36.6 °C) (06/04/19 0700)  Pulse: 69 (06/04/19 0815)  Resp: 20 (06/04/19 0815)  BP: (!) 110/56 (06/04/19 0800)  SpO2: (!) 94 % (06/04/19 0815) Vital Signs (24h Range):  Temp:  [97.8 °F (36.6 °C)-99.4 °F (37.4 °C)] 97.8 °F (36.6 °C)  Pulse:  [61-96] 69  Resp:  [10-43] 20  SpO2:  [91 %-100 %] 94 %  BP: ()/(53-61) 110/56     Weight: 82.3 kg (181 lb 7 oz)  Body mass index is 32.14 kg/m².      Intake/Output Summary (Last 24 hours) at 6/4/2019 1015  Last data filed at 6/4/2019 0800  Gross per 24 hour   Intake 995 ml   Output 1520 ml   Net -525 ml       Physical Exam   Constitutional: She is oriented to person, place, and time. She appears well-developed and well-nourished. No distress.   HENT:   Neck incision clean/dry/intact with staples  Flap with good cap refill  R neck RENETTA in place draining serosanguinous   Eyes: No scleral icterus.   Cardiovascular: Normal rate and regular rhythm.   Pulmonary/Chest: Effort normal. No respiratory distress.   Abdominal: Soft. She exhibits no distension.   Musculoskeletal:   R thigh skin graft donor site bloody underneath bandage, clean no signs of infection  R leg RENTETA in place draining serosanguinous  R leg bandage in place   Neurological: She is alert and oriented to person, place, and time.   Skin: Skin is warm and dry.   Psychiatric: She has a normal mood and affect. Thought content normal.   Vitals reviewed.      Vents:  Oxygen Concentration (%): 28 (06/04/19 0741)    Lines/Drains/Airways     Drain                 Closed/Suction Drain 05/30/19 2024 Right Leg Bulb 15 Fr. 4 days         Closed/Suction Drain 05/30/19 2024 Right Neck Bulb 15 Fr. 4 days          Gastrostomy/Enterostomy 05/30/19 1140 Percutaneous endoscopic gastrostomy (PEG) LUQ feeding 4 days    Female External Urinary Catheter 06/03/19 1100 less than 1 day          Airway                 Surgical Airway 05/30/19 1120 Shiley Cuffed 4 days          Peripheral Intravenous Line                 Peripheral IV - Single Lumen 05/30/19 18 G Right Hand 5 days         Peripheral IV - Single Lumen 06/01/19 1256 20 G;1 3/4 in Right Forearm 2 days                Significant Labs:    CBC/Anemia Profile:  Recent Labs   Lab 06/03/19 0310 06/04/19  0317   WBC 5.55  5.55 5.90   HGB 7.9*  7.9* 9.7*   HCT 24.3*  24.3* 30.1*   *  122* 169   MCV 90  90 93   RDW 13.7  13.7 13.9        Chemistries:  Recent Labs   Lab 06/03/19 0310 06/04/19 0317     136 137   K 4.6  4.6 3.6     107 107   CO2 24  24 24   BUN 13  13 17   CREATININE 0.6  0.6 0.6   CALCIUM 8.3*  8.3*  8.3* 8.9  8.9   ALBUMIN 2.1* 2.2*   PROT 5.6* 5.8*   BILITOT 0.2 0.2   ALKPHOS 53* 86   ALT 16 30   AST 39 45*   MG 2.3 1.7   PHOS 3.3 3.7       All pertinent labs within the past 24 hours have been reviewed.    Significant Imaging:  I have reviewed all pertinent imaging results/findings within the past 24 hours.

## 2019-06-04 NOTE — PLAN OF CARE
Problem: Adult Inpatient Plan of Care  Goal: Plan of Care Review  Outcome: Ongoing (interventions implemented as appropriate)  No acute events overnight. Patient alert and oriented, nod, gestures and mouths words appropriately. SpO2 > 95% on 5L 28% trach collar. Neck incision open to air with staples. Trach care performed per respiratory therapist. NSR on bedside monitor. BP stable. PEG tube intact, TF running at goal of 45 mL/hour. No residuals noted. Urine output adequate per Pure Wick. Up in chair this morning with 2 person assist, transferred well. Encouraged to keep moving/working with therapy during daytime hours. Pain controlled with scheduled tylenol, PRN oxycodone and PRN dilaudid.  RLE dressing intact with some dried drainage. Flap and neurovascular checks performed every 2 hours, both stable. Chair wheels locked, call light in reach. Updated on plan of care. See flowsheets for detailed assessment. Continuing to monitor.

## 2019-06-04 NOTE — CONSULTS
"RD received consult for "bolus tf recommendations."  RD services currently following pt. Please see note dated 5/31 for details.    Bolus recs:  Impact Peptide 1.5 bolus 1 can (250mL) 4 times daily and 1.5 cans (375mL) 1 time daily for a total of 5.5 cans/day.  - Consider scheduled 1 can at 8a, 11a, 2p, 5p and 1.5 cans at 8p.     If Isosource 1.5 warranted, recommend same regimen of 5.5 total cans/day.    RD to follow up as previously scheduled.      Thanks,  Jaimie, JOSE C, LDN    "

## 2019-06-05 PROCEDURE — 31720 CLEARANCE OF AIRWAYS: CPT

## 2019-06-05 PROCEDURE — 94761 N-INVAS EAR/PLS OXIMETRY MLT: CPT

## 2019-06-05 PROCEDURE — 94640 AIRWAY INHALATION TREATMENT: CPT

## 2019-06-05 PROCEDURE — 63600175 PHARM REV CODE 636 W HCPCS: Performed by: STUDENT IN AN ORGANIZED HEALTH CARE EDUCATION/TRAINING PROGRAM

## 2019-06-05 PROCEDURE — 99232 PR SUBSEQUENT HOSPITAL CARE,LEVL II: ICD-10-PCS | Mod: GC,,, | Performed by: SURGERY

## 2019-06-05 PROCEDURE — 99232 SBSQ HOSP IP/OBS MODERATE 35: CPT | Mod: GC,,, | Performed by: SURGERY

## 2019-06-05 PROCEDURE — 25000242 PHARM REV CODE 250 ALT 637 W/ HCPCS: Performed by: STUDENT IN AN ORGANIZED HEALTH CARE EDUCATION/TRAINING PROGRAM

## 2019-06-05 PROCEDURE — 99900035 HC TECH TIME PER 15 MIN (STAT)

## 2019-06-05 PROCEDURE — 25000003 PHARM REV CODE 250: Performed by: STUDENT IN AN ORGANIZED HEALTH CARE EDUCATION/TRAINING PROGRAM

## 2019-06-05 PROCEDURE — 99900026 HC AIRWAY MAINTENANCE (STAT)

## 2019-06-05 PROCEDURE — 27000221 HC OXYGEN, UP TO 24 HOURS

## 2019-06-05 PROCEDURE — 20000000 HC ICU ROOM

## 2019-06-05 RX ORDER — SODIUM CHLORIDE FOR INHALATION 3 %
4 VIAL, NEBULIZER (ML) INHALATION ONCE
Status: COMPLETED | OUTPATIENT
Start: 2019-06-05 | End: 2019-06-05

## 2019-06-05 RX ORDER — IPRATROPIUM BROMIDE AND ALBUTEROL SULFATE 2.5; .5 MG/3ML; MG/3ML
3 SOLUTION RESPIRATORY (INHALATION) EVERY 6 HOURS PRN
Status: DISCONTINUED | OUTPATIENT
Start: 2019-06-05 | End: 2019-06-11 | Stop reason: HOSPADM

## 2019-06-05 RX ADMIN — ACETAMINOPHEN 650 MG: 325 TABLET ORAL at 11:06

## 2019-06-05 RX ADMIN — IPRATROPIUM BROMIDE AND ALBUTEROL SULFATE 3 ML: .5; 3 SOLUTION RESPIRATORY (INHALATION) at 08:06

## 2019-06-05 RX ADMIN — ACETAMINOPHEN 650 MG: 325 TABLET ORAL at 12:06

## 2019-06-05 RX ADMIN — OXYCODONE HYDROCHLORIDE 7.5 MG: 5 SOLUTION ORAL at 08:06

## 2019-06-05 RX ADMIN — ACETAMINOPHEN 650 MG: 325 TABLET ORAL at 06:06

## 2019-06-05 RX ADMIN — PANTOPRAZOLE SODIUM 40 MG: 40 GRANULE, DELAYED RELEASE ORAL at 09:06

## 2019-06-05 RX ADMIN — SODIUM CHLORIDE SOLN NEBU 3% 4 ML: 3 NEBU SOLN at 08:06

## 2019-06-05 RX ADMIN — PROMETHAZINE HYDROCHLORIDE 6.25 MG: 25 INJECTION INTRAMUSCULAR; INTRAVENOUS at 06:06

## 2019-06-05 RX ADMIN — ENOXAPARIN SODIUM 40 MG: 100 INJECTION SUBCUTANEOUS at 04:06

## 2019-06-05 NOTE — SUBJECTIVE & OBJECTIVE
Interval History:  NAEON.  In good spirits this AM.      Medications:  Continuous Infusions:    Scheduled Meds:   enoxaparin  40 mg Subcutaneous Daily    pantoprazole  40 mg Per G Tube Daily     PRN Meds:acetaminophen, albuterol-ipratropium, HYDROmorphone, ondansetron, oxyCODONE, promethazine (PHENERGAN) IVPB     Review of patient's allergies indicates:   Allergen Reactions    Bactrim [sulfamethoxazole-trimethoprim]     Clindamycin     Keflex [cephalexin]      Tolerated Unasyn 05/31/2019    Lortab [hydrocodone-acetaminophen]     Tramadol      DIZZY AND NAUSEA, & VOMITTING     Objective:     Vital Signs (24h Range):  Temp:  [97.4 °F (36.3 °C)-99.3 °F (37.4 °C)] 97.6 °F (36.4 °C)  Pulse:  [] 80  Resp:  [10-45] 21  SpO2:  [91 %-100 %] 95 %  BP: (110-161)/(54-93) 133/63     Date 06/06/19 0700 - 06/07/19 0659   Shift 1649-3712 4820-6248 1512-2829 24 Hour Total   INTAKE   NG/   235   Shift Total(mL/kg) 235(2.9)   235(2.9)   OUTPUT   Urine(mL/kg/hr) 350   350   Drains 3   3   Shift Total(mL/kg) 353(4.3)   353(4.3)   Weight (kg) 82.3 82.3 82.3 82.3     Lines/Drains/Airways     Drain                 Closed/Suction Drain 05/30/19 2024 Right Leg Bulb 15 Fr. 6 days         Closed/Suction Drain 05/30/19 2024 Right Neck Bulb 15 Fr. 6 days         Gastrostomy/Enterostomy 05/30/19 1140 Percutaneous endoscopic gastrostomy (PEG) LUQ feeding 6 days    Female External Urinary Catheter 06/04/19 1730 1 day          Airway                 Surgical Airway 06/06/19 0800 Shiley Uncuffed less than 1 day          Peripheral Intravenous Line                 Peripheral IV - Single Lumen 05/30/19 18 G Right Hand 7 days         Peripheral IV - Single Lumen 06/01/19 1256 20 G;1 3/4 in Right Forearm 4 days              Physical Exam     Awake, appropriate affect, follows commands  Normocephalic  Oral cavity with fibula paddle in place to R buccal mucosa/retromolar trigone.  Strong arterial and venous signal, color approximates leg  skin.    Tongue mobile  Neck incision with staple line intact  Neck RENETTA drain with serosanguinous output - 17cc/24h  Tracheostomy in place, changed to 6 DCFS   R leg boot removed and dressing changed.  Skin graft appears to be healing well.  RENETTA with serosanguinous output, 10-cc       Significant Labs:  BMP:   Recent Labs   Lab 06/06/19  0416         CO2 23   BUN 20   CREATININE 0.6   CALCIUM 8.7   MG 1.8     CBC:   Recent Labs   Lab 06/06/19 0416   WBC 8.89  8.89   RBC 3.33*  3.33*   HGB 9.8*  9.8*   HCT 30.6*  30.6*     237   MCV 92  92   MCH 29.4  29.4   MCHC 32.0  32.0       Significant Diagnostics:  I have reviewed all pertinent imaging results/findings within the past 24 hours.

## 2019-06-05 NOTE — PT/OT/SLP PROGRESS
Physical Therapy      Patient Name:  Jennifer Andre   MRN:  [id number]    Patient not seen today secondary to (AM RN was not available to walk with pt and PT was not able to make 2nd attempt for treatment. ). Will follow-up at a later date..    Maylin Holloway, PT   6/5/2019

## 2019-06-05 NOTE — PROGRESS NOTES
"[facility]  Adult Nutrition  Progress Note    SUMMARY       Recommendations  Recommendation/Intervention:   Recommend advancing TF to Impact Peptide 1.5 at a goal rate of 55 mL/hr - to provide 1980 kcal/day, 124g protein/day, and 1016mL free fluid/day.   RD to monitor.    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 Continuous TF restarted, currently at [facility]. Patient with age appropriate muscle wasting with no other physical signs of malnutriton.  Nutrition Discharge Planning: Unable to determine at this time.    Nutrition Risk Screen  Nutrition Risk Screen: dysphagia or difficulty swallowing, tube feeding or parenteral nutrition    Nutrition/Diet History  Spiritual, Cultural Beliefs, Yazidi Practices, Values that Affect Care: no  Factors Affecting Nutritional Intake: NPO, difficulty/impaired swallowing    Anthropometrics  Temp: 98.5 °F (36.9 °C)  Height Method: Stated  Height: 5' 3" (160 cm)  Height (inches): 63 in  Weight Method: Bed Scale  Weight: 82.3 kg (181 lb 7 oz)  Weight (lb): 181.44 lb  Ideal Body Weight (IBW), Female: 115 lb  % Ideal Body Weight, Female (lb): 150.68 lb  BMI (Calculated): 30.8  BMI Grade: 30 - 34.9- obesity - grade I    Lab/Procedures/Meds  Pertinent Labs Reviewed: reviewed  Pertinent Labs Comments: Alb 2.2  Pertinent Medications Reviewed: reviewed  Pertinent Medications Comments: pantoprazole    Estimated/Assessed Needs  Weight Used For Calorie Calculations: 81.2 kg (179 lb 0.2 oz)  Energy Calorie Requirements (kcal): 2030 " kcal/day  Energy Need Method: Kcal/kg(25)  Protein Requirements:  g/day(1.2-1.4 g/kg)  Weight Used For Protein Calculations: 81.2 kg (179 lb 0.2 oz)  Fluid Requirements (mL): 1 mL/kcal or per MD  Estimated Fluid Requirement Method: RDA Method  RDA Method (mL): 2030     Nutrition Prescription Ordered  Current Diet Order: NPO    Evaluation of Received Nutrient/Fluid Intake  I/O: +3.7L since admit  Comments: LBM 5/30  % Intake of Estimated Energy Needs: 0 - 25 %  % Meal Intake: NPO    Nutrition Risk  Level of Risk/Frequency of Follow-up: high(2x/week)     Assessment and Plan  Nutrition Problem  Inadequate energy intake     Related to (etiology):   Decreased ability to consume sufficient energy     Signs and Symptoms (as evidenced by):   NPO with no alternative means of nutrition at this time     Interventions/Recommendations (treatment strategy):  Collaboration and referral of nutrition care     Nutrition Diagnosis Status:   Improving    Monitor and Evaluation  Food and Nutrient Intake: energy intake, enteral nutrition intake  Food and Nutrient Adminstration: enteral and parenteral nutrition administration  Anthropometric Measurements: weight, weight change  Biochemical Data, Medical Tests and Procedures: electrolyte and renal panel, gastrointestinal profile, inflammatory profile  Nutrition-Focused Physical Findings: overall appearance     Nutrition Follow-Up  RD Follow-up?: Yes

## 2019-06-05 NOTE — PLAN OF CARE
Problem: Adult Inpatient Plan of Care  Goal: Plan of Care Review  Outcome: Ongoing (interventions implemented as appropriate)  Pt kept free from falls and injuries during shift. VSS on trach collar, 5L 28% sats >95%. Pt AAO x 4, CHI. NSR- ST on monitor. SBP maintained <160. Pure Wic in place with 100cc total output during shift. Flap checks and RLE NV assessment performed Q2h, see flowsheets. RENETTA drains with no output during shift. PEG tube in place bolus feeds started , 150 cc of Peptide 1.5 given, pt did not tolerate. Emesis x 1 while being suctioned. Zofran x 1 adminstered. Weight shift assistance provided. Pressure points protected. Transfer orders in place. Will continue to monitor.

## 2019-06-05 NOTE — SUBJECTIVE & OBJECTIVE
Interval History/Significant Events: No issues with flap. Did not tolerate bolus TF's. Vomited overnight. Still nauseated this morning. Pain controlled. Refused to have labs drawn this morning.    Follow-up For: Procedure(s) (LRB):  MANDIBULECTOMY (Right)  DISSECTION, NECK (Right)  CREATION, FREE FLAP (Right)  EGD, WITH PEG TUBE INSERTION  TRACHEOTOMY (N/A)    Surgery date: 05/30/19    Objective:     Vital Signs (Most Recent):  Temp: 97.9 °F (36.6 °C) (06/05/19 0700)  Pulse: 62 (06/05/19 0759)  Resp: 14 (06/05/19 0759)  BP: 127/60 (06/05/19 0700)  SpO2: 96 % (06/05/19 0759) Vital Signs (24h Range):  Temp:  [97.7 °F (36.5 °C)-99.6 °F (37.6 °C)] 97.9 °F (36.6 °C)  Pulse:  [] 62  Resp:  [11-40] 14  SpO2:  [90 %-99 %] 96 %  BP: (107-159)/(51-72) 127/60     Weight: 82.3 kg (181 lb 7 oz)  Body mass index is 32.14 kg/m².      Intake/Output Summary (Last 24 hours) at 6/5/2019 0925  Last data filed at 6/5/2019 0700  Gross per 24 hour   Intake 505 ml   Output 1291 ml   Net -786 ml       Physical Exam   Constitutional: She is oriented to person, place, and time. She appears well-developed and well-nourished. No distress.   HENT:   Neck incision clean/dry/intact with staples  Flap with good cap refill  R neck RENETTA in place draining serosanguinous   Eyes: No scleral icterus.   Cardiovascular: Normal rate and regular rhythm.   Pulmonary/Chest: Effort normal. No respiratory distress.   Abdominal: Soft. She exhibits no distension.   Musculoskeletal:   R thigh skin graft donor site bloody underneath bandage, clean no signs of infection  R leg RENETTA in place draining serosanguinous  R leg bandage in place   Neurological: She is alert and oriented to person, place, and time.   Skin: Skin is warm and dry.   Psychiatric: She has a normal mood and affect. Thought content normal.   Vitals reviewed.      Vents:  Oxygen Concentration (%): 28 (06/05/19 0759)    Lines/Drains/Airways     Drain                 Closed/Suction Drain 05/30/19  2024 Right Leg Bulb 15 Fr. 5 days         Closed/Suction Drain 05/30/19 2024 Right Neck Bulb 15 Fr. 5 days         Gastrostomy/Enterostomy 05/30/19 1140 Percutaneous endoscopic gastrostomy (PEG) LUQ feeding 5 days    Female External Urinary Catheter 06/04/19 1730 less than 1 day          Airway                 Surgical Airway 05/30/19 1120 Shiley Cuffed 5 days          Peripheral Intravenous Line                 Peripheral IV - Single Lumen 05/30/19 18 G Right Hand 6 days         Peripheral IV - Single Lumen 06/01/19 1256 20 G;1 3/4 in Right Forearm 3 days                Significant Labs:    CBC/Anemia Profile:  Recent Labs   Lab 06/04/19 0317   WBC 5.90   HGB 9.7*   HCT 30.1*      MCV 93   RDW 13.9        Chemistries:  Recent Labs   Lab 06/04/19 0317      K 3.6      CO2 24   BUN 17   CREATININE 0.6   CALCIUM 8.9  8.9   ALBUMIN 2.2*   PROT 5.8*   BILITOT 0.2   ALKPHOS 86   ALT 30   AST 45*   MG 1.7   PHOS 3.7       All pertinent labs within the past 24 hours have been reviewed.    Significant Imaging:  I have reviewed all pertinent imaging results/findings within the past 24 hours.

## 2019-06-05 NOTE — ASSESSMENT & PLAN NOTE
64 y.o. 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    Neuro:   - AAOx4  - pain: scheduled tylenol per G tube, PRN dilaudid     Pulmonary:   - trach collar  - wean supplemental O2 requirements as tolerated  - cont to monitor respiratory status     Cardiac:   - HDS  - continue to monitor     Renal:    - Andersen removed  -   - monitor UOP  - Monitor BUN/Cr     ID:   - Afebrile  - Monitor WBC  - Perioperative unasyn complete     Hem/Onc:   - monitor H/H  - transfuse PRN     Endocrine:    - monitor BG     Fluids/Electrolytes/Nutrition/GI:   - NPO  - Replace electrolytes PRN  - Did not tolerate bolus TF's  - Restart TF's (Impact Peptide) at trickle rate this afternoon, then advance as tolerated to goal of 45 cc/hr  - Home protonix    F: impact peptide 10 cc/hr, advance as tolerated  A: scheduled tylenol per G tube, PRN dilaudid  S: N/A  T: Lovenox  H: 30 degrees, OOBTC  U: home protonix  G: monitor BG     DISPO:  - stepdown per primary, awaiting bed  - flap checks q2h  - restart trickle feeds  - OOB/PT/OT

## 2019-06-05 NOTE — PROGRESS NOTES
[facility]  Critical Care - Surgery  Progress Note    Patient Name: Jennifer Andre  MRN: [id number]  Admission Date: 5/30/2019  Hospital Length of Stay: 6 days  Code Status: Full Code  Attending Provider: Wallace Santiago MD  Primary Care Provider: Marcello Benavidez MD   Principal Problem: Squamous cell carcinoma    Subjective:     Hospital/ICU Course:  No notes on file    Interval History/Significant Events: No issues with flap. Did not tolerate bolus TF's. Vomited overnight. Still nauseated this morning. Pain controlled. Refused to have labs drawn this morning.    Follow-up For: Procedure(s) (LRB):  MANDIBULECTOMY (Right)  DISSECTION, NECK (Right)  CREATION, FREE FLAP (Right)  EGD, WITH PEG TUBE INSERTION  TRACHEOTOMY (N/A)    Surgery date: 05/30/19    Objective:     Vital Signs (Most Recent):  Temp: 97.9 °F (36.6 °C) (06/05/19 0700)  Pulse: 62 (06/05/19 0759)  Resp: 14 (06/05/19 0759)  BP: 127/60 (06/05/19 0700)  SpO2: 96 % (06/05/19 0759) Vital Signs (24h Range):  Temp:  [97.7 °F (36.5 °C)-99.6 °F (37.6 °C)] 97.9 °F (36.6 °C)  Pulse:  [] 62  Resp:  [11-40] 14  SpO2:  [90 %-99 %] 96 %  BP: (107-159)/(51-72) 127/60     Weight: 82.3 kg (181 lb 7 oz)  Body mass index is 32.14 kg/m².      Intake/Output Summary (Last 24 hours) at 6/5/2019 0925  Last data filed at 6/5/2019 0700  Gross per 24 hour   Intake 505 ml   Output 1291 ml   Net -786 ml       Physical Exam   Constitutional: She is oriented to person, place, and time. She appears well-developed and well-nourished. No distress.   HENT:   Neck incision clean/dry/intact with staples  Flap with good cap refill  R neck RENETTA in place draining serosanguinous   Eyes: No scleral icterus.   Cardiovascular: Normal rate and regular rhythm.   Pulmonary/Chest: Effort normal. No respiratory distress.   Abdominal: Soft. She exhibits no distension.   Musculoskeletal:   R thigh skin graft donor site bloody underneath bandage, clean no signs of infection  R  leg RENETTA in place draining serosanguinous  R leg bandage in place   Neurological: She is alert and oriented to person, place, and time.   Skin: Skin is warm and dry.   Psychiatric: She has a normal mood and affect. Thought content normal.   Vitals reviewed.      Vents:  Oxygen Concentration (%): 28 (06/05/19 0759)    Lines/Drains/Airways     Drain                 Closed/Suction Drain 05/30/19 2024 Right Leg Bulb 15 Fr. 5 days         Closed/Suction Drain 05/30/19 2024 Right Neck Bulb 15 Fr. 5 days         Gastrostomy/Enterostomy 05/30/19 1140 Percutaneous endoscopic gastrostomy (PEG) LUQ feeding 5 days    Female External Urinary Catheter 06/04/19 1730 less than 1 day          Airway                 Surgical Airway 05/30/19 1120 Shiley Cuffed 5 days          Peripheral Intravenous Line                 Peripheral IV - Single Lumen 05/30/19 18 G Right Hand 6 days         Peripheral IV - Single Lumen 06/01/19 1256 20 G;1 3/4 in Right Forearm 3 days                Significant Labs:    CBC/Anemia Profile:  Recent Labs   Lab 06/04/19  0317   WBC 5.90   HGB 9.7*   HCT 30.1*      MCV 93   RDW 13.9        Chemistries:  Recent Labs   Lab 06/04/19  0317      K 3.6      CO2 24   BUN 17   CREATININE 0.6   CALCIUM 8.9  8.9   ALBUMIN 2.2*   PROT 5.8*   BILITOT 0.2   ALKPHOS 86   ALT 30   AST 45*   MG 1.7   PHOS 3.7       All pertinent labs within the past 24 hours have been reviewed.    Significant Imaging:  I have reviewed all pertinent imaging results/findings within the past 24 hours.    Assessment/Plan:     * Squamous cell carcinoma  64 y.o. 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    Neuro:   - AAOx4  - pain: scheduled tylenol per G tube, PRN dilaudid     Pulmonary:   - trach collar  - wean supplemental O2  requirements as tolerated  - cont to monitor respiratory status     Cardiac:   - HDS  - continue to monitor     Renal:    - Andersen removed  -   - monitor UOP  - Monitor BUN/Cr     ID:   - Afebrile  - Monitor WBC  - Perioperative unasyn complete     Hem/Onc:   - monitor H/H  - transfuse PRN     Endocrine:    - monitor BG     Fluids/Electrolytes/Nutrition/GI:   - NPO  - Replace electrolytes PRN  - Did not tolerate bolus TF's  - Restart TF's (Impact Peptide) at trickle rate this afternoon, then advance as tolerated to goal of 45 cc/hr  - Home protonix    F: impact peptide 10 cc/hr, advance as tolerated  A: scheduled tylenol per G tube, PRN dilaudid  S: N/A  T: Lovenox  H: 30 degrees, OOBTC  U: home protonix  G: monitor BG     DISPO:  - stepdown per primary, awaiting bed  - flap checks q2h  - restart trickle feeds  - OOB/PT/OT         Critical care was time spent personally by me on the following activities: development of treatment plan with patient or surrogate and bedside caregivers, discussions with consultants, evaluation of patient's response to treatment, examination of patient, ordering and performing treatments and interventions, ordering and review of laboratory studies, ordering and review of radiographic studies, pulse oximetry, re-evaluation of patient's condition.  This critical care time did not overlap with that of any other provider or involve time for any procedures.     Chintan Jose MD  Critical Care - Surgery  [facility]

## 2019-06-06 LAB
ALBUMIN SERPL BCP-MCNC: 2.4 G/DL (ref 3.5–5.2)
ALP SERPL-CCNC: 74 U/L (ref 55–135)
ALT SERPL W/O P-5'-P-CCNC: 67 U/L (ref 10–44)
ANION GAP SERPL CALC-SCNC: 10 MMOL/L (ref 8–16)
AST SERPL-CCNC: 56 U/L (ref 10–40)
BASOPHILS # BLD AUTO: 0.06 K/UL (ref 0–0.2)
BASOPHILS # BLD AUTO: 0.06 K/UL (ref 0–0.2)
BASOPHILS NFR BLD: 0.7 % (ref 0–1.9)
BASOPHILS NFR BLD: 0.7 % (ref 0–1.9)
BILIRUB SERPL-MCNC: 0.3 MG/DL (ref 0.1–1)
BUN SERPL-MCNC: 20 MG/DL (ref 8–23)
CALCIUM SERPL-MCNC: 8.7 MG/DL (ref 8.7–10.5)
CHLORIDE SERPL-SCNC: 104 MMOL/L (ref 95–110)
CO2 SERPL-SCNC: 23 MMOL/L (ref 23–29)
CREAT SERPL-MCNC: 0.6 MG/DL (ref 0.5–1.4)
DIFFERENTIAL METHOD: ABNORMAL
DIFFERENTIAL METHOD: ABNORMAL
EOSINOPHIL # BLD AUTO: 0 K/UL (ref 0–0.5)
EOSINOPHIL # BLD AUTO: 0 K/UL (ref 0–0.5)
EOSINOPHIL NFR BLD: 0 % (ref 0–8)
EOSINOPHIL NFR BLD: 0 % (ref 0–8)
ERYTHROCYTE [DISTWIDTH] IN BLOOD BY AUTOMATED COUNT: 14.1 % (ref 11.5–14.5)
ERYTHROCYTE [DISTWIDTH] IN BLOOD BY AUTOMATED COUNT: 14.1 % (ref 11.5–14.5)
EST. GFR  (AFRICAN AMERICAN): >60 ML/MIN/1.73 M^2
EST. GFR  (NON AFRICAN AMERICAN): >60 ML/MIN/1.73 M^2
GLUCOSE SERPL-MCNC: 110 MG/DL (ref 70–110)
HCT VFR BLD AUTO: 30.6 % (ref 37–48.5)
HCT VFR BLD AUTO: 30.6 % (ref 37–48.5)
HGB BLD-MCNC: 9.8 G/DL (ref 12–16)
HGB BLD-MCNC: 9.8 G/DL (ref 12–16)
IMM GRANULOCYTES # BLD AUTO: 0.06 K/UL (ref 0–0.04)
IMM GRANULOCYTES # BLD AUTO: 0.06 K/UL (ref 0–0.04)
IMM GRANULOCYTES NFR BLD AUTO: 0.7 % (ref 0–0.5)
IMM GRANULOCYTES NFR BLD AUTO: 0.7 % (ref 0–0.5)
LYMPHOCYTES # BLD AUTO: 0.9 K/UL (ref 1–4.8)
LYMPHOCYTES # BLD AUTO: 0.9 K/UL (ref 1–4.8)
LYMPHOCYTES NFR BLD: 9.6 % (ref 18–48)
LYMPHOCYTES NFR BLD: 9.6 % (ref 18–48)
MAGNESIUM SERPL-MCNC: 1.8 MG/DL (ref 1.6–2.6)
MCH RBC QN AUTO: 29.4 PG (ref 27–31)
MCH RBC QN AUTO: 29.4 PG (ref 27–31)
MCHC RBC AUTO-ENTMCNC: 32 G/DL (ref 32–36)
MCHC RBC AUTO-ENTMCNC: 32 G/DL (ref 32–36)
MCV RBC AUTO: 92 FL (ref 82–98)
MCV RBC AUTO: 92 FL (ref 82–98)
MONOCYTES # BLD AUTO: 0.7 K/UL (ref 0.3–1)
MONOCYTES # BLD AUTO: 0.7 K/UL (ref 0.3–1)
MONOCYTES NFR BLD: 7.3 % (ref 4–15)
MONOCYTES NFR BLD: 7.3 % (ref 4–15)
NEUTROPHILS # BLD AUTO: 7.3 K/UL (ref 1.8–7.7)
NEUTROPHILS # BLD AUTO: 7.3 K/UL (ref 1.8–7.7)
NEUTROPHILS NFR BLD: 81.7 % (ref 38–73)
NEUTROPHILS NFR BLD: 81.7 % (ref 38–73)
NRBC BLD-RTO: 0 /100 WBC
NRBC BLD-RTO: 0 /100 WBC
PHOSPHATE SERPL-MCNC: 4 MG/DL (ref 2.7–4.5)
PLATELET # BLD AUTO: 237 K/UL (ref 150–350)
PLATELET # BLD AUTO: 237 K/UL (ref 150–350)
PMV BLD AUTO: 10.6 FL (ref 9.2–12.9)
PMV BLD AUTO: 10.6 FL (ref 9.2–12.9)
POTASSIUM SERPL-SCNC: 3.8 MMOL/L (ref 3.5–5.1)
PROT SERPL-MCNC: 6.2 G/DL (ref 6–8.4)
RBC # BLD AUTO: 3.33 M/UL (ref 4–5.4)
RBC # BLD AUTO: 3.33 M/UL (ref 4–5.4)
SODIUM SERPL-SCNC: 137 MMOL/L (ref 136–145)
WBC # BLD AUTO: 8.89 K/UL (ref 3.9–12.7)
WBC # BLD AUTO: 8.89 K/UL (ref 3.9–12.7)

## 2019-06-06 PROCEDURE — 37799 UNLISTED PX VASCULAR SURGERY: CPT

## 2019-06-06 PROCEDURE — 25000003 PHARM REV CODE 250: Performed by: STUDENT IN AN ORGANIZED HEALTH CARE EDUCATION/TRAINING PROGRAM

## 2019-06-06 PROCEDURE — 27200966 HC CLOSED SUCTION SYSTEM

## 2019-06-06 PROCEDURE — 27201112

## 2019-06-06 PROCEDURE — 85025 COMPLETE CBC W/AUTO DIFF WBC: CPT

## 2019-06-06 PROCEDURE — 84100 ASSAY OF PHOSPHORUS: CPT

## 2019-06-06 PROCEDURE — 97530 THERAPEUTIC ACTIVITIES: CPT

## 2019-06-06 PROCEDURE — 83735 ASSAY OF MAGNESIUM: CPT

## 2019-06-06 PROCEDURE — 97116 GAIT TRAINING THERAPY: CPT

## 2019-06-06 PROCEDURE — 80053 COMPREHEN METABOLIC PANEL: CPT

## 2019-06-06 PROCEDURE — 27000221 HC OXYGEN, UP TO 24 HOURS

## 2019-06-06 PROCEDURE — 99900026 HC AIRWAY MAINTENANCE (STAT)

## 2019-06-06 PROCEDURE — 99232 PR SUBSEQUENT HOSPITAL CARE,LEVL II: ICD-10-PCS | Mod: GC,,, | Performed by: SURGERY

## 2019-06-06 PROCEDURE — 89220 SPUTUM SPECIMEN COLLECTION: CPT

## 2019-06-06 PROCEDURE — 99232 SBSQ HOSP IP/OBS MODERATE 35: CPT | Mod: GC,,, | Performed by: SURGERY

## 2019-06-06 PROCEDURE — 20000000 HC ICU ROOM

## 2019-06-06 PROCEDURE — 99900022

## 2019-06-06 PROCEDURE — 63600175 PHARM REV CODE 636 W HCPCS: Performed by: STUDENT IN AN ORGANIZED HEALTH CARE EDUCATION/TRAINING PROGRAM

## 2019-06-06 PROCEDURE — 99900035 HC TECH TIME PER 15 MIN (STAT)

## 2019-06-06 PROCEDURE — 82803 BLOOD GASES ANY COMBINATION: CPT

## 2019-06-06 PROCEDURE — 94761 N-INVAS EAR/PLS OXIMETRY MLT: CPT

## 2019-06-06 RX ORDER — LANOLIN ALCOHOL/MO/W.PET/CERES
400 CREAM (GRAM) TOPICAL ONCE
Status: COMPLETED | OUTPATIENT
Start: 2019-06-06 | End: 2019-06-06

## 2019-06-06 RX ORDER — ACETAMINOPHEN 325 MG/1
650 TABLET ORAL EVERY 8 HOURS PRN
Status: DISCONTINUED | OUTPATIENT
Start: 2019-06-06 | End: 2019-06-11 | Stop reason: HOSPADM

## 2019-06-06 RX ADMIN — HYDROMORPHONE HYDROCHLORIDE 1 MG: 1 INJECTION, SOLUTION INTRAMUSCULAR; INTRAVENOUS; SUBCUTANEOUS at 04:06

## 2019-06-06 RX ADMIN — PANTOPRAZOLE SODIUM 40 MG: 40 GRANULE, DELAYED RELEASE ORAL at 09:06

## 2019-06-06 RX ADMIN — OXYCODONE HYDROCHLORIDE 7.5 MG: 5 SOLUTION ORAL at 06:06

## 2019-06-06 RX ADMIN — MAGNESIUM OXIDE TAB 400 MG (241.3 MG ELEMENTAL MG) 400 MG: 400 (241.3 MG) TAB at 07:06

## 2019-06-06 RX ADMIN — ACETAMINOPHEN 325 MG: 325 TABLET ORAL at 01:06

## 2019-06-06 RX ADMIN — ACETAMINOPHEN 650 MG: 325 TABLET ORAL at 05:06

## 2019-06-06 NOTE — PLAN OF CARE
Problem: Physical Therapy Goal  Goal: Physical Therapy Goal  Goals to be met by: 19     Patient will increase functional independence with mobility by performin. Supine to sit with Set-up [place]- not met  2. Sit to supine with Set-up [place] -not met  3. Sit to stand transfer with Minimal Assistance- met   4. Bed to chair transfer with Minimal Assistance using appropriate AD -met   5. Gait  x 50 feet with Minimal Assistance using appropriate AD. - met   6. Lower extremity exercise program x20-30 reps per handout, with independence to improve functional strength and endurance  7. Pt supervision sit to/from stand. - not met  8. Pt receive gait training ~ 250 ft with RW and supervision- not met   Goals updated for content and for time frame and are appropriate. Maylin Holloway, PT 2019

## 2019-06-06 NOTE — PLAN OF CARE
[facility]    HOME HEALTH ORDERS  FACE TO FACE ENCOUNTER    Patient Name: Jennifer Andre  YOB: 1955    Physician: Dr. 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       Follow up Appointment: 1 week    I have seen and examined this patient face to face today. My clinical findings that support the need for the home health skilled services and home bound status are the following:  Weakness/numbness causing balance and gait disturbance due to malignancy/cancer and surgery making it taxing to leave home.  Medical restrictions requiring assistance of another human to leave home due to  unstable ambulation, post surgery monitoring and newly placed g-tube/ostomy.    Allergies:  Review of patient's allergies indicates:   Allergen Reactions    Bactrim [sulfamethoxazole-trimethoprim]     Clindamycin     Keflex [cephalexin]      Tolerated Unasyn 05/31/2019    Lortab [hydrocodone-acetaminophen]     Tramadol      DIZZY AND NAUSEA, & VOMITTING       Diet: Impact 1.5 (or equivalent) 55 mL/hr - to provide 1980 kcal/day, 90g protein/day, and 1008mL free fluid/day.     If patient desires, consider nocturnal TF of Isosource 1.5 at a goal rate of 80 mL/hr x 16 hours - to provide 1920 kcal/day, 87g protein/day, and 978mL free fluid/day.        Activities: Light activity only. No heavy lifting, straining, stooping, exercising.       Nursing:   SN to complete comprehensive assessment  including routine vital signs. Instruct on disease process and s/s of complications to report to MD. Review/verify medication list sent home with the patient at time of discharge  and instruct patient/caregiver as needed. Frequency may be adjusted depending on start of care date.    Notify MD if SBP > 160 or < 90; DBP > 90 or < 50; HR > 120 or < 50; Temp > 101      CONSULTS:      Physical Therapy to evaluate and treat. Evaluate for home safety and equipment needs; Establish/upgrade home exercise program. Perform / instruct on therapeutic exercises, gait training, transfer training, and Range of Motion.  Work on shoulder abduction and trapezius strength.    Occupational Therapy to evaluate and treat. Evaluate home environment for safety and equipment needs. Perform/Instruct on transfers, ADL training, ROM, and therapeutic exercises.  Work on shoulder abduction and trapezius strength.    Speech Therapy  to evaluate and treat for language and swallowing.      MISCELLANEOUS CARE:  PEG Care:  Instruct patient/caregiver to clean site.  Monitor skin integrity.       WOUND CARE ORDERS  No heavy lifting > 10 lbs x 2 weeks. Keep incision clean and dry. Keep open to air .Okay to get wet. DO NOT scrub, soak, or submerge incision. Pat to dry. Apply bacitracin ointment to incision BID prn.       Medications: Review discharge medications with patient and family and provide education.       enoxaparin  40 mg Subcutaneous Daily    pantoprazole  40 mg Per G Tube Daily     acetaminophen, albuterol-ipratropium, HYDROmorphone, ondansetron, oxyCODONE, promethazine (PHENERGAN) IVPB   Jennifer Andre   Home Medication Instructions TREY:[id number]    Printed on:06/06/19 1012   Medication Information                      clobetasol (TEMOVATE) 0.05 % cream  Apply topically 2 (two) times daily as needed.             pantoprazole (PROTONIX) 40 MG tablet  Take 40 mg by mouth once daily.                 I certify that this patient is confined  to home and needs nursing care, physical therapy, speech therapy and occupational therapy.    JAVED Shaffer, FNP-C  [facility]- Division of Head and Neck Surgical Oncology  [phone]

## 2019-06-06 NOTE — ASSESSMENT & PLAN NOTE
POD 7 - Composite resection right oral cavity and right mandible, right modified radical neck dissection, right fibular free flap reconstruction, split thickness skin graft from right thigh, tracheostomy, PEG placement.     Consult SLP for PMSV trial  Routine fresh trach care, humidified O2 via trach collar  Bolus TFs as tolerated  Continue Q4H flap checks and RLE neuro checks.    PT/OT/Nutrition consulted  Awaiting floor bed

## 2019-06-06 NOTE — PLAN OF CARE
Problem: Adult Inpatient Plan of Care  Goal: Plan of Care Review  Outcome: Ongoing (interventions implemented as appropriate)  POC reviewed with Jennifer Andre and Jose Alberto, at 0500. Pt communicated understanding. Questions and concerns addressed. No acute events overnight. Pt progressing toward goals. Will continue to monitor. See flowsheets for full assessment and VS info

## 2019-06-06 NOTE — PROGRESS NOTES
[facility]  Critical Care - Surgery  Progress Note    Patient Name: Jennifer Andre  MRN: [id number]  Admission Date: 5/30/2019  Hospital Length of Stay: 7 days  Code Status: Full Code  Attending Provider: Wallace Santiago MD  Primary Care Provider: Marcello Benavidez MD   Principal Problem: Squamous cell carcinoma    Subjective:     Hospital/ICU Course:  No notes on file    Interval History/Significant Events: No issues with flap. Tolerating tube feeds at 40 cc/hr with no N/V. Pain controlled.    Follow-up For: Procedure(s) (LRB):  MANDIBULECTOMY (Right)  DISSECTION, NECK (Right)  CREATION, FREE FLAP (Right)  EGD, WITH PEG TUBE INSERTION  TRACHEOTOMY (N/A)    Surgery date: 05/30/19    Objective:     Vital Signs (Most Recent):  Temp: 98.1 °F (36.7 °C) (06/06/19 1100)  Pulse: 81 (06/06/19 1315)  Resp: (!) 23 (06/06/19 1315)  BP: (!) 110/55 (06/06/19 1300)  SpO2: 98 % (06/06/19 1315) Vital Signs (24h Range):  Temp:  [97.4 °F (36.3 °C)-99.3 °F (37.4 °C)] 98.1 °F (36.7 °C)  Pulse:  [] 81  Resp:  [13-37] 23  SpO2:  [93 %-100 %] 98 %  BP: (100-161)/(48-93) 110/55     Weight: 82.3 kg (181 lb 7 oz)  Body mass index is 32.14 kg/m².      Intake/Output Summary (Last 24 hours) at 6/6/2019 1504  Last data filed at 6/6/2019 1300  Gross per 24 hour   Intake 1122.96 ml   Output 918 ml   Net 204.96 ml       Physical Exam   Constitutional: She is oriented to person, place, and time. She appears well-developed and well-nourished. No distress.   HENT:   Neck incision clean/dry/intact with staples  Flap with good cap refill  R neck RENETTA in place draining serosanguinous   Eyes: No scleral icterus.   Cardiovascular: Normal rate and regular rhythm.   Pulmonary/Chest: Effort normal. No respiratory distress.   Abdominal: Soft. She exhibits no distension.   Musculoskeletal:   R thigh skin graft donor site clean with no signs of infection  R leg RENETTA in place draining serosanguinous  R leg bandage in place   Neurological:  She is alert and oriented to person, place, and time.   Skin: Skin is warm and dry.   Psychiatric: She has a normal mood and affect. Thought content normal.   Vitals reviewed.      Vents:  Oxygen Concentration (%): 28 (06/06/19 1300)    Lines/Drains/Airways     Drain                 Gastrostomy/Enterostomy 05/30/19 1140 Percutaneous endoscopic gastrostomy (PEG) LUQ feeding 7 days         Closed/Suction Drain 05/30/19 2024 Right Leg Bulb 15 Fr. 6 days         Closed/Suction Drain 05/30/19 2024 Right Neck Bulb 15 Fr. 6 days    Female External Urinary Catheter 06/04/19 1730 1 day          Airway                 Surgical Airway 06/06/19 0800 Shiley Uncuffed less than 1 day          Peripheral Intravenous Line                 Peripheral IV - Single Lumen 05/30/19 18 G Right Hand 7 days         Peripheral IV - Single Lumen 06/01/19 1256 20 G;1 3/4 in Right Forearm 5 days                Significant Labs:    CBC/Anemia Profile:  Recent Labs   Lab 06/06/19  0416   WBC 8.89  8.89   HGB 9.8*  9.8*   HCT 30.6*  30.6*     237   MCV 92  92   RDW 14.1  14.1        Chemistries:  Recent Labs   Lab 06/06/19  0416      K 3.8      CO2 23   BUN 20   CREATININE 0.6   CALCIUM 8.7   ALBUMIN 2.4*   PROT 6.2   BILITOT 0.3   ALKPHOS 74   ALT 67*   AST 56*   MG 1.8   PHOS 4.0       All pertinent labs within the past 24 hours have been reviewed.    Significant Imaging:  I have reviewed all pertinent imaging results/findings within the past 24 hours.    Assessment/Plan:     * Squamous cell carcinoma  64 y.o. 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    Neuro:   - AAOx4  - PRN dilaudid, oxy, and tylenol     Pulmonary:   - trach collar  - wean supplemental O2 requirements as tolerated  - cont to monitor respiratory  status     Cardiac:   - HDS  - continue to monitor     Renal:    - Andersen removed  - monitor UOP  - Monitor BUN/Cr     ID:   - Afebrile  - Monitor WBC  - Perioperative unasyn complete     Hem/Onc:   - monitor H/H  - transfuse PRN     Endocrine:    - monitor BG     Fluids/Electrolytes/Nutrition/GI:   - NPO  - Replace electrolytes PRN  - Did not tolerate bolus TF's  - TF's (Impact Peptide) at goal rate of 45 cc/hr  - Home protonix  - Trend LFT's    F: impact peptide 45 cc/hr  A: PRN dilaudid, tylenol, oxy  S: N/A  T: Lovenox  H: 30 degrees, OOBTC  U: home protonix  G: monitor BG     DISPO:  - stepdown per primary, awaiting bed  - flap checks q2h  - restart trickle feeds  - OOB/PT/OT         Critical care was time spent personally by me on the following activities: development of treatment plan with patient or surrogate and bedside caregivers, discussions with consultants, evaluation of patient's response to treatment, examination of patient, ordering and performing treatments and interventions, ordering and review of laboratory studies, ordering and review of radiographic studies, pulse oximetry, re-evaluation of patient's condition.  This critical care time did not overlap with that of any other provider or involve time for any procedures.     Chintan Jose MD  Critical Care - Surgery  [facility]

## 2019-06-06 NOTE — PLAN OF CARE
Problem: Adult Inpatient Plan of Care  Goal: Plan of Care Review  Outcome: Ongoing (interventions implemented as appropriate)  Pt VSS at this time. HR 60-70s, SBP<160, O2>94% on trach collar 5L 28%, afebrile. AAOx4, following commands and moving all extremities well. Family member at bedside, updated on current condition and POC for am shift. All questions answered and emotional support provided. OOBTC 14hrs tolerated very well. Pt assisted with weight shift, and encouraged to cough/deep breathe. Remains free of falls and injury for am shift. MD updated on current condition, vitals, labs, and gtts. No new orders received, will continue to monitor.

## 2019-06-06 NOTE — PLAN OF CARE
06/06/19 1140   Post-Acute Status   Post-Acute Authorization Home Health/Hospice;HME   HME Status Referrals Sent   Home Health/Hospice Status Referrals Sent       SW met with Pt and Pt's  at bedside. Discussed therapy recs for HH. Pt and Pt's  reported that they had used [facility] for HH needs in the past and would like to have referrals sent to this facility. SW discussed tube feed needs with Pt and Pt's . Pt and Pt's  stated that SW could send referral to [facility] for tube feed needs.   SW sent referrals to [facility] for HH needs, and [facility] for tube feed needs.  SW send additional referral to [facility] for Pt's infusion needs due to [facility] not being in network with Pt's insurance.      SW will continue to follow patient for DC needs. GELY in communication with LEONILA Specner   LMSW [facility]  [phone]

## 2019-06-06 NOTE — PLAN OF CARE
06/06/19 1400   Post-Acute Status   Post-Acute Authorization HME   HME Status   (TF-referral accepted, pending insurance )     Patient accepted by [facility] for tube feed needs.   GELY will continue to follow patient for DC needs. GELY in communication with LEONILA Spencer   LMSW [facility]  [phone]

## 2019-06-06 NOTE — PT/OT/SLP PROGRESS
Physical Therapy Treatment    Patient Name:  Jennifer Andre   MRN:  [id number]    Recommendations:     Discharge Recommendations:  nursing facility, skilled   Discharge Equipment Recommendations: (will determine DME needs closer to discharge)   Barriers to discharge: Decreased caregiver support  Family may not be able to assist at current functional level.     Assessment:     Jennifer Andre is a 64 y.o. female admitted with a medical diagnosis of Squamous cell carcinoma.  She presents with the following impairments/functional limitations:  gait instability, impaired balance, impaired endurance, pain, decreased lower extremity function, impaired functional mobilty pt fredy treatment better being able to gait train farther. Pt will benefit from cont skilled PT 4x/wk to progress physically. Pt may need SNF placement to maximize rehab potential. Pt is s/p R mandibulectomy, R neck dissection, PEG tube, trach etc 5/30/19.    Rehab Prognosis: Good; patient would benefit from acute skilled PT services to address these deficits and reach maximum level of function.    Recent Surgery: Procedure(s) (LRB):  MANDIBULECTOMY (Right)  DISSECTION, NECK (Right)  CREATION, FREE FLAP (Right)  EGD, WITH PEG TUBE INSERTION  TRACHEOTOMY (N/A) 7 Days Post-Op    Plan:     During this hospitalization, patient to be seen 4 x/week to address the identified rehab impairments via gait training, therapeutic activities, therapeutic exercises and progress toward the following goals:    · Plan of Care Expires:  07/01/19    Subjective     Chief Complaint: pt c/o pain in RLE with treatment.   Patient/Family Comments/goals:  To get better and go home.   Pain/Comfort:  · Pain Rating 1: 5/10  · Location - Side 1: Right  · Location 1: (thigh, skin graft)  · Pain Rating Post-Intervention 1: 5/10      Objective:     Communicated with nurse prior to session.  Patient found supine with telemetry, pulse ox (continuous), tracheostomy, oxygen, blood pressure  cuff, RENETTA drain, PEG Tube, PureWick, bed alarm(hep lock IV, ) upon PT entry to room.     General Precautions: Standard, fall   Orthopedic Precautions:N/A   Braces: (walking boot RLE)     Functional Mobility:  · Bed Mobility:   Pt needed verbal cues for hand placement and sequencing for functional mobility.  · Rolling Right: contact guard assistance  · Supine to Sit: contact guard assistance  ·   · Transfers:     · Sit to Stand:  contact guard assistance with rolling walker. Pt stood x 3 reps.  ·   · Gait: pt received gait training ~ 130 ft with RW, O2 and CGA. pt had 1 sitting rest period with gait training. RN was present with portable monitor and assist of 1 needed to follow with chair.       AM-PAC 6 CLICK MOBILITY  Turning over in bed (including adjusting bedclothes, sheets and blankets)?: 3  Sitting down on and standing up from a chair with arms (e.g., wheelchair, bedside commode, etc.): 3  Moving from lying on back to sitting on the side of the bed?: 3  Moving to and from a bed to a chair (including a wheelchair)?: 3  Need to walk in hospital room?: 3  Climbing 3-5 steps with a railing?: 1  Basic Mobility Total Score: 16         Patient left up in chair with all lines intact and call button in reach..    GOALS:   Multidisciplinary Problems     Physical Therapy Goals        Problem: Physical Therapy Goal    Goal Priority Disciplines Outcome Goal Variances Interventions   Physical Therapy Goal     PT, PT/OT Ongoing (interventions implemented as appropriate)     Description:  Goals to be met by: 19     Patient will increase functional independence with mobility by performin. Supine to sit with Set-up [place]- not met  2. Sit to supine with Set-up [place] -not met  3. Sit to stand transfer with Minimal Assistance- met   4. Bed to chair transfer with Minimal Assistance using appropriate AD -met   5. Gait  x 50 feet with Minimal Assistance using appropriate AD. - met   6. Lower  extremity exercise program x20-30 reps per handout, with independence to improve functional strength and endurance  7. Pt supervision sit to/from stand. - not met  8. Pt receive gait training ~ 250 ft with RW and supervision- not met                     Time Tracking:     PT Received On: 06/06/19  PT Start Time: 0906     PT Stop Time: 0936  PT Total Time (min): 30 min     Billable Minutes: Gait Training 13 min and Therapeutic Activity 17 min    Treatment Type: Treatment  PT/PTA: PT     PTA Visit Number: 0     Maylin Holloway, PT  06/06/2019

## 2019-06-06 NOTE — CONSULTS
Unable to find suitable veins for IV access at this time.  Patient has 2 working PIVs at this time.

## 2019-06-06 NOTE — PROGRESS NOTES
[facility]  Otorhinolaryngology-Head & Neck Surgery  Progress Note    Subjective:     Post-Op Info:  Procedure(s) (LRB):  MANDIBULECTOMY (Right)  DISSECTION, NECK (Right)  CREATION, FREE FLAP (Right)  EGD, WITH PEG TUBE INSERTION  TRACHEOTOMY (N/A)   7 Days Post-Op  Hospital Day: 8     Interval History:  NAEON.  In good spirits this AM.      Medications:  Continuous Infusions:    Scheduled Meds:   enoxaparin  40 mg Subcutaneous Daily    pantoprazole  40 mg Per G Tube Daily     PRN Meds:acetaminophen, albuterol-ipratropium, HYDROmorphone, ondansetron, oxyCODONE, promethazine (PHENERGAN) IVPB     Review of patient's allergies indicates:   Allergen Reactions    Bactrim [sulfamethoxazole-trimethoprim]     Clindamycin     Keflex [cephalexin]      Tolerated Unasyn 05/31/2019    Lortab [hydrocodone-acetaminophen]     Tramadol      DIZZY AND NAUSEA, & VOMITTING     Objective:     Vital Signs (24h Range):  Temp:  [97.4 °F (36.3 °C)-99.3 °F (37.4 °C)] 97.6 °F (36.4 °C)  Pulse:  [] 80  Resp:  [10-45] 21  SpO2:  [91 %-100 %] 95 %  BP: (110-161)/(54-93) 133/63     Date 06/06/19 0700 - 06/07/19 0659   Shift 6850-3474 8403-3939 0519-0450 24 Hour Total   INTAKE   NG/   235   Shift Total(mL/kg) 235(2.9)   235(2.9)   OUTPUT   Urine(mL/kg/hr) 350   350   Drains 3   3   Shift Total(mL/kg) 353(4.3)   353(4.3)   Weight (kg) 82.3 82.3 82.3 82.3     Lines/Drains/Airways     Drain                 Closed/Suction Drain 05/30/19 2024 Right Leg Bulb 15 Fr. 6 days         Closed/Suction Drain 05/30/19 2024 Right Neck Bulb 15 Fr. 6 days         Gastrostomy/Enterostomy 05/30/19 1140 Percutaneous endoscopic gastrostomy (PEG) LUQ feeding 6 days    Female External Urinary Catheter 06/04/19 1730 1 day          Airway                 Surgical Airway 06/06/19 0800 Subha Uncuffed less than 1 day          Peripheral Intravenous Line                 Peripheral IV - Single Lumen 05/30/19 18 G Right Hand 7 days          Peripheral IV - Single Lumen 06/01/19 1256 20 G;1 3/4 in Right Forearm 4 days              Physical Exam     Awake, appropriate affect, follows commands  Normocephalic  Oral cavity with fibula paddle in place to R buccal mucosa/retromolar trigone.  Strong arterial and venous signal, color approximates leg skin.    Tongue mobile  Neck incision with staple line intact  Neck RENETTA drain with serosanguinous output - 17cc/24h  Tracheostomy in place, changed to 6 DCFS   R leg boot removed and dressing changed.  Skin graft appears to be healing well.  RENETTA with serosanguinous output, 10-cc       Significant Labs:  BMP:   Recent Labs   Lab 06/06/19  0416         CO2 23   BUN 20   CREATININE 0.6   CALCIUM 8.7   MG 1.8     CBC:   Recent Labs   Lab 06/06/19 0416   WBC 8.89  8.89   RBC 3.33*  3.33*   HGB 9.8*  9.8*   HCT 30.6*  30.6*     237   MCV 92  92   MCH 29.4  29.4   MCHC 32.0  32.0       Significant Diagnostics:  I have reviewed all pertinent imaging results/findings within the past 24 hours.    Assessment/Plan:     Malignant neoplasm of oral cavity  POD 7 - Composite resection right oral cavity and right mandible, right modified radical neck dissection, right fibular free flap reconstruction, split thickness skin graft from right thigh, tracheostomy, PEG placement.     Consult SLP for PMSV trial  Routine fresh trach care, humidified O2 via trach collar  Bolus TFs as tolerated  Continue Q4H flap checks and RLE neuro checks.    PT/OT/Nutrition consulted  Awaiting floor bed        Reuben Ledesma MD  Otorhinolaryngology-Head & Neck Surgery  [facility]

## 2019-06-06 NOTE — SUBJECTIVE & OBJECTIVE
Interval History/Significant Events: No issues with flap. Tolerating tube feeds at 40 cc/hr with no N/V. Pain controlled.    Follow-up For: Procedure(s) (LRB):  MANDIBULECTOMY (Right)  DISSECTION, NECK (Right)  CREATION, FREE FLAP (Right)  EGD, WITH PEG TUBE INSERTION  TRACHEOTOMY (N/A)    Surgery date: 05/30/19    Objective:     Vital Signs (Most Recent):  Temp: 98.1 °F (36.7 °C) (06/06/19 1100)  Pulse: 81 (06/06/19 1315)  Resp: (!) 23 (06/06/19 1315)  BP: (!) 110/55 (06/06/19 1300)  SpO2: 98 % (06/06/19 1315) Vital Signs (24h Range):  Temp:  [97.4 °F (36.3 °C)-99.3 °F (37.4 °C)] 98.1 °F (36.7 °C)  Pulse:  [] 81  Resp:  [13-37] 23  SpO2:  [93 %-100 %] 98 %  BP: (100-161)/(48-93) 110/55     Weight: 82.3 kg (181 lb 7 oz)  Body mass index is 32.14 kg/m².      Intake/Output Summary (Last 24 hours) at 6/6/2019 1504  Last data filed at 6/6/2019 1300  Gross per 24 hour   Intake 1122.96 ml   Output 918 ml   Net 204.96 ml       Physical Exam   Constitutional: She is oriented to person, place, and time. She appears well-developed and well-nourished. No distress.   HENT:   Neck incision clean/dry/intact with staples  Flap with good cap refill  R neck RENETTA in place draining serosanguinous   Eyes: No scleral icterus.   Cardiovascular: Normal rate and regular rhythm.   Pulmonary/Chest: Effort normal. No respiratory distress.   Abdominal: Soft. She exhibits no distension.   Musculoskeletal:   R thigh skin graft donor site clean with no signs of infection  R leg RENETTA in place draining serosanguinous  R leg bandage in place   Neurological: She is alert and oriented to person, place, and time.   Skin: Skin is warm and dry.   Psychiatric: She has a normal mood and affect. Thought content normal.   Vitals reviewed.      Vents:  Oxygen Concentration (%): 28 (06/06/19 1300)    Lines/Drains/Airways     Drain                 Gastrostomy/Enterostomy 05/30/19 1140 Percutaneous endoscopic gastrostomy (PEG) LUQ feeding 7 days          Closed/Suction Drain 05/30/19 2024 Right Leg Bulb 15 Fr. 6 days         Closed/Suction Drain 05/30/19 2024 Right Neck Bulb 15 Fr. 6 days    Female External Urinary Catheter 06/04/19 1730 1 day          Airway                 Surgical Airway 06/06/19 0800 Shiley Uncuffed less than 1 day          Peripheral Intravenous Line                 Peripheral IV - Single Lumen 05/30/19 18 G Right Hand 7 days         Peripheral IV - Single Lumen 06/01/19 1256 20 G;1 3/4 in Right Forearm 5 days                Significant Labs:    CBC/Anemia Profile:  Recent Labs   Lab 06/06/19  0416   WBC 8.89  8.89   HGB 9.8*  9.8*   HCT 30.6*  30.6*     237   MCV 92  92   RDW 14.1  14.1        Chemistries:  Recent Labs   Lab 06/06/19  0416      K 3.8      CO2 23   BUN 20   CREATININE 0.6   CALCIUM 8.7   ALBUMIN 2.4*   PROT 6.2   BILITOT 0.3   ALKPHOS 74   ALT 67*   AST 56*   MG 1.8   PHOS 4.0       All pertinent labs within the past 24 hours have been reviewed.    Significant Imaging:  I have reviewed all pertinent imaging results/findings within the past 24 hours.

## 2019-06-06 NOTE — PLAN OF CARE
Problem: Occupational Therapy Goal  Goal: Occupational Therapy Goal  Goals to be met by: 6/10/19    Patient will increase functional independence with ADLs by performing:    UE Dressing with Stand-by Assistance.  LE Dressing with Moderate Assistance.  Grooming while standing at sink with Minimal Assistance.  Toileting from bedside commode with Moderate Assistance for hygiene and clothing management.   Supine to sit with Minimal Assistance.  Stand pivot transfers with Moderate Assistance in prep for performance of functional activity.  Toilet transfer to bedside commodper handout, with independence.     Outcome: Ongoing (interventions implemented as appropriate)  The above goals remain appropriate. ALVIN Campbell  6/6/2019

## 2019-06-06 NOTE — PT/OT/SLP PROGRESS
Occupational Therapy   Treatment    Name: Jennifer Andre  MRN: [id number]  Admitting Diagnosis:  Squamous cell carcinoma s/p MANDIBULECTOMY (Right)  DISSECTION, NECK (Right)  CREATION, FREE FLAP (Right)  EGD, WITH PEG TUBE INSERTION  TRACHEOTOMY (N/A) 7 Days Post-Op    Recommendations:     Discharge Recommendations: nursing facility, skilled  Discharge Equipment Recommendations:  walker, rolling, commode  Barriers to discharge:  None    Assessment:     Jennifer Andre is a 64 y.o. female with a medical diagnosis of Squamous cell carcinoma.  Performance deficits affecting function are weakness, impaired self care skills, impaired balance, impaired functional mobilty, impaired endurance, gait instability, decreased lower extremity function, pain, impaired skin.     Rehab Prognosis:  Good; patient would benefit from acute skilled OT services to address these deficits and reach maximum level of function.       Plan:     Patient to be seen 4 x/week to address the above listed problems via self-care/home management, therapeutic activities, therapeutic exercises  · Plan of Care Expires: 06/30/19  · Plan of Care Reviewed with: patient    Subjective     Pain/Comfort:  · Pain Rating 1: 5/10  · Location - Side 1: Left  · Location - Orientation 1: generalized  · Location 1: leg  · Pain Addressed 1: Reposition, Distraction  · Pain Rating Post-Intervention 1: 5/10    Objective:     Communicated with: RN prior to session.  Patient found up in chair with blood pressure cuff, telemetry, pulse ox (continuous), peripheral IV, PureWick, PEG Tube, RENETTA drain upon OT entry to room.    General Precautions: Standard, fall   Orthopedic Precautions:N/A   Braces:       Occupational Performance:     Bed Mobility:    · NT     [facility] 6 Click ADL: 14    Treatment & Education:  Pt ed on OT POC  Pt instructed in RROM ex's using red Theraband for increased UB strength with use of walker    Patient left up in chair with all lines intact, call button in  reach and RN notifiedEducation:      GOALS:   Multidisciplinary Problems     Occupational Therapy Goals        Problem: Occupational Therapy Goal    Goal Priority Disciplines Outcome Interventions   Occupational Therapy Goal     OT, PT/OT Ongoing (interventions implemented as appropriate)    Description:  Goals to be met by: 6/10/19    Patient will increase functional independence with ADLs by performing:    UE Dressing with Stand-by Assistance.  LE Dressing with Moderate Assistance.  Grooming while standing at sink with Minimal Assistance.  Toileting from bedside commode with Moderate Assistance for hygiene and clothing management.   Supine to sit with Minimal Assistance.  Stand pivot transfers with Moderate Assistance in prep for performance of functional activity.  Toilet transfer to bedside commodper handout, with independence.                      Time Tracking:     OT Date of Treatment: 06/06/19  OT Start Time: 1116  OT Stop Time: 1127  OT Total Time (min): 11 min    Billable Minutes:Therapeutic Activity 11    ALVIN Campbell  6/6/2019

## 2019-06-07 LAB
ALBUMIN SERPL BCP-MCNC: 2.6 G/DL (ref 3.5–5.2)
ALBUMIN SERPL BCP-MCNC: 2.6 G/DL (ref 3.5–5.2)
ALP SERPL-CCNC: 67 U/L (ref 55–135)
ALP SERPL-CCNC: 67 U/L (ref 55–135)
ALT SERPL W/O P-5'-P-CCNC: 51 U/L (ref 10–44)
ALT SERPL W/O P-5'-P-CCNC: 51 U/L (ref 10–44)
ANION GAP SERPL CALC-SCNC: 7 MMOL/L (ref 8–16)
ANION GAP SERPL CALC-SCNC: 7 MMOL/L (ref 8–16)
AST SERPL-CCNC: 30 U/L (ref 10–40)
AST SERPL-CCNC: 30 U/L (ref 10–40)
BASOPHILS # BLD AUTO: 0.07 K/UL (ref 0–0.2)
BASOPHILS NFR BLD: 0.8 % (ref 0–1.9)
BILIRUB SERPL-MCNC: 0.4 MG/DL (ref 0.1–1)
BILIRUB SERPL-MCNC: 0.4 MG/DL (ref 0.1–1)
BUN SERPL-MCNC: 18 MG/DL (ref 8–23)
BUN SERPL-MCNC: 18 MG/DL (ref 8–23)
CALCIUM SERPL-MCNC: 9.1 MG/DL (ref 8.7–10.5)
CALCIUM SERPL-MCNC: 9.1 MG/DL (ref 8.7–10.5)
CHLORIDE SERPL-SCNC: 104 MMOL/L (ref 95–110)
CHLORIDE SERPL-SCNC: 104 MMOL/L (ref 95–110)
CO2 SERPL-SCNC: 25 MMOL/L (ref 23–29)
CO2 SERPL-SCNC: 25 MMOL/L (ref 23–29)
CREAT SERPL-MCNC: 0.7 MG/DL (ref 0.5–1.4)
CREAT SERPL-MCNC: 0.7 MG/DL (ref 0.5–1.4)
DIFFERENTIAL METHOD: ABNORMAL
EOSINOPHIL # BLD AUTO: 0 K/UL (ref 0–0.5)
EOSINOPHIL NFR BLD: 0 % (ref 0–8)
ERYTHROCYTE [DISTWIDTH] IN BLOOD BY AUTOMATED COUNT: 14.1 % (ref 11.5–14.5)
EST. GFR  (AFRICAN AMERICAN): >60 ML/MIN/1.73 M^2
EST. GFR  (AFRICAN AMERICAN): >60 ML/MIN/1.73 M^2
EST. GFR  (NON AFRICAN AMERICAN): >60 ML/MIN/1.73 M^2
EST. GFR  (NON AFRICAN AMERICAN): >60 ML/MIN/1.73 M^2
GLUCOSE SERPL-MCNC: 107 MG/DL (ref 70–110)
GLUCOSE SERPL-MCNC: 107 MG/DL (ref 70–110)
HCT VFR BLD AUTO: 30.8 % (ref 37–48.5)
HGB BLD-MCNC: 9.7 G/DL (ref 12–16)
IMM GRANULOCYTES # BLD AUTO: 0.04 K/UL (ref 0–0.04)
IMM GRANULOCYTES NFR BLD AUTO: 0.5 % (ref 0–0.5)
LYMPHOCYTES # BLD AUTO: 0.8 K/UL (ref 1–4.8)
LYMPHOCYTES NFR BLD: 9.8 % (ref 18–48)
MAGNESIUM SERPL-MCNC: 1.9 MG/DL (ref 1.6–2.6)
MAGNESIUM SERPL-MCNC: 1.9 MG/DL (ref 1.6–2.6)
MCH RBC QN AUTO: 29.3 PG (ref 27–31)
MCHC RBC AUTO-ENTMCNC: 31.5 G/DL (ref 32–36)
MCV RBC AUTO: 93 FL (ref 82–98)
MONOCYTES # BLD AUTO: 0.7 K/UL (ref 0.3–1)
MONOCYTES NFR BLD: 8.2 % (ref 4–15)
NEUTROPHILS # BLD AUTO: 6.9 K/UL (ref 1.8–7.7)
NEUTROPHILS NFR BLD: 80.7 % (ref 38–73)
NRBC BLD-RTO: 0 /100 WBC
PHOSPHATE SERPL-MCNC: 3.5 MG/DL (ref 2.7–4.5)
PHOSPHATE SERPL-MCNC: 3.5 MG/DL (ref 2.7–4.5)
PLATELET # BLD AUTO: 242 K/UL (ref 150–350)
PMV BLD AUTO: 10.2 FL (ref 9.2–12.9)
POTASSIUM SERPL-SCNC: 3.6 MMOL/L (ref 3.5–5.1)
POTASSIUM SERPL-SCNC: 3.6 MMOL/L (ref 3.5–5.1)
PROT SERPL-MCNC: 6.4 G/DL (ref 6–8.4)
PROT SERPL-MCNC: 6.4 G/DL (ref 6–8.4)
RBC # BLD AUTO: 3.31 M/UL (ref 4–5.4)
SODIUM SERPL-SCNC: 136 MMOL/L (ref 136–145)
SODIUM SERPL-SCNC: 136 MMOL/L (ref 136–145)
WBC # BLD AUTO: 8.58 K/UL (ref 3.9–12.7)

## 2019-06-07 PROCEDURE — 25000003 PHARM REV CODE 250: Performed by: STUDENT IN AN ORGANIZED HEALTH CARE EDUCATION/TRAINING PROGRAM

## 2019-06-07 PROCEDURE — 80053 COMPREHEN METABOLIC PANEL: CPT

## 2019-06-07 PROCEDURE — 99232 SBSQ HOSP IP/OBS MODERATE 35: CPT | Mod: GC,,, | Performed by: SURGERY

## 2019-06-07 PROCEDURE — 97116 GAIT TRAINING THERAPY: CPT

## 2019-06-07 PROCEDURE — 20600001 HC STEP DOWN PRIVATE ROOM

## 2019-06-07 PROCEDURE — 94761 N-INVAS EAR/PLS OXIMETRY MLT: CPT

## 2019-06-07 PROCEDURE — 27000221 HC OXYGEN, UP TO 24 HOURS

## 2019-06-07 PROCEDURE — 94640 AIRWAY INHALATION TREATMENT: CPT

## 2019-06-07 PROCEDURE — 83735 ASSAY OF MAGNESIUM: CPT

## 2019-06-07 PROCEDURE — 99900035 HC TECH TIME PER 15 MIN (STAT)

## 2019-06-07 PROCEDURE — 85025 COMPLETE CBC W/AUTO DIFF WBC: CPT

## 2019-06-07 PROCEDURE — 63600175 PHARM REV CODE 636 W HCPCS: Performed by: STUDENT IN AN ORGANIZED HEALTH CARE EDUCATION/TRAINING PROGRAM

## 2019-06-07 PROCEDURE — 25000242 PHARM REV CODE 250 ALT 637 W/ HCPCS: Performed by: STUDENT IN AN ORGANIZED HEALTH CARE EDUCATION/TRAINING PROGRAM

## 2019-06-07 PROCEDURE — 99232 PR SUBSEQUENT HOSPITAL CARE,LEVL II: ICD-10-PCS | Mod: GC,,, | Performed by: SURGERY

## 2019-06-07 PROCEDURE — 92597 ORAL SPEECH DEVICE EVAL: CPT

## 2019-06-07 PROCEDURE — 84100 ASSAY OF PHOSPHORUS: CPT

## 2019-06-07 PROCEDURE — 99900026 HC AIRWAY MAINTENANCE (STAT)

## 2019-06-07 RX ADMIN — ENOXAPARIN SODIUM 40 MG: 100 INJECTION SUBCUTANEOUS at 05:06

## 2019-06-07 RX ADMIN — OXYCODONE HYDROCHLORIDE 7.5 MG: 5 SOLUTION ORAL at 04:06

## 2019-06-07 RX ADMIN — OXYCODONE HYDROCHLORIDE 7.5 MG: 5 SOLUTION ORAL at 06:06

## 2019-06-07 RX ADMIN — IPRATROPIUM BROMIDE AND ALBUTEROL SULFATE 3 ML: .5; 3 SOLUTION RESPIRATORY (INHALATION) at 05:06

## 2019-06-07 RX ADMIN — ACETAMINOPHEN 650 MG: 325 TABLET ORAL at 05:06

## 2019-06-07 RX ADMIN — PANTOPRAZOLE SODIUM 40 MG: 40 GRANULE, DELAYED RELEASE ORAL at 08:06

## 2019-06-07 RX ADMIN — IPRATROPIUM BROMIDE AND ALBUTEROL SULFATE 3 ML: .5; 3 SOLUTION RESPIRATORY (INHALATION) at 07:06

## 2019-06-07 NOTE — PLAN OF CARE
Problem: SLP Goal  Goal: SLP Goal  Pt seen for speech-language assessment this date. Pt tolerating PMSV without difficulty and able to achieve adequate phonation. Speech to continue to follow.     Holly Bailey MS, CCC-SLP  Speech Language Pathologist  Pager: [phone]  Date 6/7/2019

## 2019-06-07 NOTE — SUBJECTIVE & OBJECTIVE
Interval History:  NAEON.  Did not get PMV trial yesterday.    Medications:  Continuous Infusions:    Scheduled Meds:   enoxaparin  40 mg Subcutaneous Daily    pantoprazole  40 mg Per G Tube Daily     PRN Meds:acetaminophen, albuterol-ipratropium, HYDROmorphone, ondansetron, oxyCODONE, promethazine (PHENERGAN) IVPB     Review of patient's allergies indicates:   Allergen Reactions    Bactrim [sulfamethoxazole-trimethoprim]     Clindamycin     Keflex [cephalexin]      Tolerated Unasyn 05/31/2019    Lortab [hydrocodone-acetaminophen]     Tramadol      DIZZY AND NAUSEA, & VOMITTING     Objective:     Vital Signs (24h Range):  Temp:  [96.8 °F (36 °C)-98.2 °F (36.8 °C)] 97.4 °F (36.3 °C)  Pulse:  [] 76  Resp:  [15-40] 25  SpO2:  [96 %-100 %] 100 %  BP: ()/(45-67) 103/51     Date 06/07/19 0700 - 06/08/19 0659   Shift 0269-3748 9571-0164 3920-5863 24 Hour Total   INTAKE   NG/   110   Shift Total(mL/kg) 110(1.3)   110(1.3)   OUTPUT   Urine(mL/kg/hr) 150   150   Drains 6   6   Shift Total(mL/kg) 156(1.9)   156(1.9)   Weight (kg) 82.3 82.3 82.3 82.3     Lines/Drains/Airways     Drain                 Gastrostomy/Enterostomy 05/30/19 1140 Percutaneous endoscopic gastrostomy (PEG) LUQ feeding 8 days         Closed/Suction Drain 05/30/19 2024 Right Leg Bulb 15 Fr. 7 days         Closed/Suction Drain 05/30/19 2024 Right Neck Bulb 15 Fr. 7 days    Female External Urinary Catheter 06/04/19 1730 2 days          Airway                 Surgical Airway 06/06/19 0800 Shiley Uncuffed 1 day          Peripheral Intravenous Line                 Peripheral IV - Single Lumen 06/01/19 1256 20 G;1 3/4 in Right Forearm 5 days              Physical Exam     Awake, appropriate affect, follows commands  Normocephalic  Oral cavity with fibula paddle in place to R buccal mucosa/retromolar trigone.  Strong arterial and venous signal, color approximates leg skin.    Tongue mobile  Neck incision with staple line intact  Neck RENETTA  drain with serosanguinous output - 17cc/24h  Tracheostomy in place 6 DCFS   R leg skin graft appears to be healing well.  RENETTA with serosanguinous output, 10-cc       Significant Labs:  BMP:   Recent Labs   Lab 06/07/19  0725     107     104   CO2 25  25   BUN 18  18   CREATININE 0.7  0.7   CALCIUM 9.1  9.1   MG 1.9  1.9     CBC:   Recent Labs   Lab 06/07/19  0725   WBC 8.58   RBC 3.31*   HGB 9.7*   HCT 30.8*      MCV 93   MCH 29.3   MCHC 31.5*       Significant Diagnostics:  I have reviewed all pertinent imaging results/findings within the past 24 hours.

## 2019-06-07 NOTE — SUBJECTIVE & OBJECTIVE
Interval History/Significant Events: NAEON. Vitals remain stable in normal range. On 4.5-5L trach collar. Sats high 90s. .  TF at 20. Reported nausea with TF yesterday.    Follow-up For: Procedure(s) (LRB):  MANDIBULECTOMY (Right)  DISSECTION, NECK (Right)  CREATION, FREE FLAP (Right)  EGD, WITH PEG TUBE INSERTION  TRACHEOTOMY (N/A)    Surgery date: 05/30/19    Objective:     Vital Signs (Most Recent):  Temp: 97.4 °F (36.3 °C) (06/07/19 0700)  Pulse: 76 (06/07/19 0800)  Resp: (!) 25 (06/07/19 0800)  BP: (!) 103/51 (06/07/19 0800)  SpO2: 100 % (06/07/19 0800) Vital Signs (24h Range):  Temp:  [96.8 °F (36 °C)-98.2 °F (36.8 °C)] 97.4 °F (36.3 °C)  Pulse:  [] 76  Resp:  [15-40] 25  SpO2:  [95 %-100 %] 100 %  BP: ()/(45-67) 103/51     Weight: 82.3 kg (181 lb 7 oz)  Body mass index is 32.14 kg/m².      Intake/Output Summary (Last 24 hours) at 6/7/2019 0851  Last data filed at 6/7/2019 0800  Gross per 24 hour   Intake 855 ml   Output 975 ml   Net -120 ml       Physical Exam   Constitutional: She is oriented to person, place, and time. She appears well-developed and well-nourished. No distress.   HENT:   Neck incision clean/dry/intact   Flap with good cap refill  R neck RENETTA in place draining serosanguinous   Eyes: No scleral icterus.   Cardiovascular: Normal rate and regular rhythm.   Pulmonary/Chest: Effort normal. No respiratory distress.   Abdominal: Soft. She exhibits no distension.   Musculoskeletal:   R thigh skin graft donor site clean with no signs of infection  R leg RENETTA in place draining serosanguinous  R leg bandage in place   Neurological: She is alert and oriented to person, place, and time.   Skin: Skin is warm and dry.   Psychiatric: She has a normal mood and affect. Thought content normal.   Vitals reviewed.      Vents:  Oxygen Concentration (%): 28 (06/07/19 0800)    Lines/Drains/Airways     Drain                 Closed/Suction Drain 05/30/19 2024 Right Leg Bulb 15 Fr. 7 days          Closed/Suction Drain 05/30/19 2024 Right Neck Bulb 15 Fr. 7 days         Gastrostomy/Enterostomy 05/30/19 1140 Percutaneous endoscopic gastrostomy (PEG) LUQ feeding 7 days    Female External Urinary Catheter 06/04/19 1730 2 days          Airway                 Surgical Airway 06/06/19 0800 Shiley Uncuffed 1 day          Peripheral Intravenous Line                 Peripheral IV - Single Lumen 06/01/19 1256 20 G;1 3/4 in Right Forearm 5 days                Significant Labs:    CBC/Anemia Profile:  Recent Labs   Lab 06/06/19  0416 06/07/19  0725   WBC 8.89  8.89 8.58   HGB 9.8*  9.8* 9.7*   HCT 30.6*  30.6* 30.8*     237 242   MCV 92  92 93   RDW 14.1  14.1 14.1        Chemistries:  Recent Labs   Lab 06/06/19  0416 06/07/19  0725    136  136   K 3.8 3.6  3.6    104  104   CO2 23 25  25   BUN 20 18  18   CREATININE 0.6 0.7  0.7   CALCIUM 8.7 9.1  9.1   ALBUMIN 2.4* 2.6*  2.6*   PROT 6.2 6.4  6.4   BILITOT 0.3 0.4  0.4   ALKPHOS 74 67  67   ALT 67* 51*  51*   AST 56* 30  30   MG 1.8 1.9  1.9   PHOS 4.0 3.5  3.5       All pertinent labs within the past 24 hours have been reviewed.    Significant Imaging:  I have reviewed all pertinent imaging results/findings within the past 24 hours.

## 2019-06-07 NOTE — PROGRESS NOTES
Pt arrived to floor AAOx4.  Assisted from wheelchair to bed.  R leg with graft site WIN.  Lower leg wrapped with ACE bandage.  Flap check done with ICU RN along with bedside handoff.  #6 shiley in place 5L 28% with tele and cont pulse ox placed on pt.  Vitals stable.  Daughter at bedside.  PEG clamped.  House supervisor called for overnight tube feed delivery, no answer. Will attempt call back.  Pt and daughter oriented to room and call light.  Call light within reach of pt.  Bed locked and lowest position.  Suction at bedside.  WCTM.

## 2019-06-07 NOTE — PLAN OF CARE
Problem: Physical Therapy Goal  Goal: Physical Therapy Goal  Goals to be met by: 19     Patient will increase functional independence with mobility by performin. Supine to sit with Set-up [place]- not met  2. Sit to supine with Set-up [place] -not met  3. Sit to stand transfer with Minimal Assistance- met   4. Bed to chair transfer with Minimal Assistance using appropriate AD -met   5. Gait  x 50 feet with Minimal Assistance using appropriate AD. - met   6. Lower extremity exercise program x20-30 reps per handout, with independence to improve functional strength and endurance  7. Pt supervision sit to/from stand. - not met  8. Pt receive gait training ~ 250 ft with RW and supervision- not met    Goals remain appropriate. Maylin Holloway, PT 2019

## 2019-06-07 NOTE — PLAN OF CARE
Problem: Adult Inpatient Plan of Care  Goal: Plan of Care Review  Outcome: Ongoing (interventions implemented as appropriate)  POC reviewed with Jennifer Andre and family at 0500. Pt verbalized understanding. Questions and concerns addressed. No acute events today. Pt progressing toward goals. Transfer orders remain. Flap checks and neurovascular checks q 4hrs. Pt refused AM labs by RN but agreed to venipuncture from phlebotomist. Phlebotomy unavailable at this time but will come to bedside. Will continue to monitor. See flowsheets for full assessment and VS info.

## 2019-06-07 NOTE — PLAN OF CARE
Patient waiting for stepdown bed from ICU, q 4 hour flap checks, will try PMV today and may de-cannulate over the weekend.       06/07/19 3887   Discharge Reassessment   Assessment Type Discharge Planning Reassessment   Discharge Plan A Home with family   Discharge Plan B Home with family;Home Health

## 2019-06-07 NOTE — PT/OT/SLP PROGRESS
Physical Therapy Treatment    Patient Name:  Jennifer Andre   MRN:  [id number]    Recommendations:     Discharge Recommendations:  home health PT   Discharge Equipment Recommendations: walker, rolling, commode   Barriers to discharge: None    Assessment:     Jennifer Andre is a 64 y.o. female admitted with a medical diagnosis of Squamous cell carcinoma.  She presents with the following impairments/functional limitations:  impaired balance, impaired endurance, gait instability, impaired functional mobilty pt fredy treatment better being able to gait train farther. Pt will benefit from cont skilled PT 4x/wk to progress physically. Pt will be able to discharge home with HHPT and RW when medically stable.     Rehab Prognosis: Good; patient would benefit from acute skilled PT services to address these deficits and reach maximum level of function.    Recent Surgery: Procedure(s) (LRB):  MANDIBULECTOMY (Right)  DISSECTION, NECK (Right)  CREATION, FREE FLAP (Right)  EGD, WITH PEG TUBE INSERTION  TRACHEOTOMY (N/A) 8 Days Post-Op    Plan:     During this hospitalization, patient to be seen 4 x/week to address the identified rehab impairments via gait training, therapeutic activities, therapeutic exercises and progress toward the following goals:    · Plan of Care Expires:  07/01/19    Subjective     Chief Complaint: pt had no complaints during treatment.   Patient/Family Comments/goals:  To get better and go home.   Pain/Comfort:  · Pain Rating 1: 0/10  · Pain Rating Post-Intervention 1: 0/10      Objective:     Communicated with nurse prior to session.  Patient found up in chair with telemetry, blood pressure cuff, pulse ox (continuous), oxygen, RENETTA drain, PEG Tube(hep lock IV) upon PT entry to room.     General Precautions: Standard, fall   Orthopedic Precautions:N/A   Braces:       Functional Mobility:  · Transfers:  Pt needed verbal cues for hand placement with RW usage.   ·    · Sit to Stand:  contact guard assistance  with rolling walker. Pt stood x 3 reps.   ·   · Gait: pt received gait training ~ 90 ft, 80 ft ,170 ft (340 ft total) with RW CGA, O2. RN present with portable monitor and pt had sitting rest periods between distance ambulated.       AM-PAC 6 CLICK MOBILITY  Turning over in bed (including adjusting bedclothes, sheets and blankets)?: 3  Sitting down on and standing up from a chair with arms (e.g., wheelchair, bedside commode, etc.): 3  Moving from lying on back to sitting on the side of the bed?: 3  Moving to and from a bed to a chair (including a wheelchair)?: 3  Need to walk in hospital room?: 3  Climbing 3-5 steps with a railing?: 3  Basic Mobility Total Score: 18         Patient left up in chair with all lines intact, call button in reach and daughter present..    GOALS:   Multidisciplinary Problems     Physical Therapy Goals        Problem: Physical Therapy Goal    Goal Priority Disciplines Outcome Goal Variances Interventions   Physical Therapy Goal     PT, PT/OT Ongoing (interventions implemented as appropriate)     Description:  Goals to be met by: 19     Patient will increase functional independence with mobility by performin. Supine to sit with Set-up [place]- not met  2. Sit to supine with Set-up [place] -not met  3. Sit to stand transfer with Minimal Assistance- met   4. Bed to chair transfer with Minimal Assistance using appropriate AD -met   5. Gait  x 50 feet with Minimal Assistance using appropriate AD. - met   6. Lower extremity exercise program x20-30 reps per handout, with independence to improve functional strength and endurance  7. Pt supervision sit to/from stand. - not met  8. Pt receive gait training ~ 250 ft with RW and supervision- not met                     Time Tracking:     PT Received On: 19  PT Start Time: 1025     PT Stop Time: 1100  PT Total Time (min): 35 min     Billable Minutes: Gait Training 35 min    Treatment Type: Treatment  PT/PTA: PT      PTA Visit Number: 0     Maylin Holloway, PT  06/07/2019

## 2019-06-07 NOTE — PLAN OF CARE
SW learned from ENT NP, Catalina Perea, that Pt would not be discharging over the weekend as previously thought. Pt may discharge home on Monday. Pt has been referred and accepted by [facility] out of [place], [place] and [facility] for tube feeds. Both agencies are aware of Pt's pending discharge for Monday. SW to continue to follow as needed.     Katie Murcia, CHAYITO        06/07/19 1217   Post-Acute Status   Post-Acute Authorization Home Health/Hospice;HME   HME Status Pending Delivery Hospital   Home Health/Hospice Status Set-up Complete

## 2019-06-07 NOTE — ASSESSMENT & PLAN NOTE
POD 8 - Composite resection right oral cavity and right mandible, right modified radical neck dissection, right fibular free flap reconstruction, split thickness skin graft from right thigh, tracheostomy, PEG placement.     PMSV - will do capping trial if she tolerates this  Routine fresh trach care, humidified O2 via trach collar  Bolus TFs as tolerated   Continue Q4H flap checks and RLE neuro checks.    PT/OT/Nutrition consulted  Awaiting floor bed

## 2019-06-07 NOTE — PT/OT/SLP EVAL
Speech Language Pathology Evaluation  One Way Speaking Valve Evaluation    Patient Name:  Jennifer Andre   MRN:  [id number]  Admitting Diagnosis: Squamous cell carcinoma    IMPORTANT  CAUTION: CUFF MUST ALWAYS BE DEFLATED WHEN VALVE IN USE    Recommendations:                 General Recommendations:  One Way Speaking Valve  Diet recommendations:  NPO(per medical team ), NPO(per medical team )   Aspiration Precautions: Alternate means of nutrition/hydration ; swallow assessment to be completed in future therapy sessions once cleared by medical team    General Precautions: Standard,    Communication strategies:  none    History:     Past Medical History:   Diagnosis Date    Cancer        Past Surgical History:   Procedure Laterality Date    APPENDECTOMY      CHOLECYSTECTOMY      CREATION, FREE FLAP Right 5/30/2019    Performed by Navdeep Dangelo MD at [facility] OR [address] [place] [address]    DISSECTION, NECK Right 5/30/2019    Performed by Wallace Santiago MD at [facility] OR [address] [place] [address]    EGD, WITH PEG TUBE INSERTION  5/30/2019    Performed by Ottoniel Jimenez MD at [facility] OR [address] [place] [address]    HYSTERECTOMY      MANDIBLE SURGERY      MANDIBULECTOMY Right 5/30/2019    Performed by Wallace Santiago MD at [facility] OR [address] [place] [address]    TRACHEOTOMY N/A 5/30/2019    Performed by Navdeep Dangelo MD at [facility] OR [address] [place] [address]       Social History: Patient lives with       Occupation/hobbies/homemaking: independent with ADLS at baseline     Composite resection right oral cavity and right mandible, right modified radical neck dissection, right fibular free flap reconstruction, split thickness skin graft from right thigh, tracheostomy, PEG placement.     Subjective     Pt awake alert and appropriate ; daughter at the bedside     Objective:     Level of Alertness:  · Alert  · Cooperative    Secretion Management:  ·  Patient was suctioned prior to placement of [place] Valve (PMV)   · Pt with thick secretions     Pain/Comfort:  Pain Rating 1: 2/10  Pain Rating  Post-Intervention 1: 2/10    Oral Musculature Evaluation  Oral Musculature: gross asymmetry present(pt s/p right sided hemimandibulectomy w/ Right neck discetion)  Lingual Strength and Mobility: WFL    Trach/Vent:  · Tracheostomy Type  · Shiley  · Uncuffed  · Tracheostomy Size: 6.0    One Way Speaking Valve Placement:  Type of speaking valve: One Way Speaking Valve  [place]     [place]y [place] Speaking Valve  Vocal quality with digital finger occlusion:  O2 sats at rest: 100%  O2 sats with PMSV in place: 100%  Vocal quality with valve in place: strong and clear   Back pressure upon removal: none appreciated   Minutes PMSV worn: 30+  Education topics addressed: cleaning valve, one-way technology, anatomy of trach, removal of valve prior to sleeping, daughter demonstrated ability to independently don/doff PMSV     Education provided to Pt re: SLP role in acute care setting, overall impressions and therapeutic goals. Whiteboard updated.      Assessment:     Jennifer Andre is a 64 y.o. female with an SLP diagnosis of S/P Trach and One Way Speaking Valve Training.    Goals:   Multidisciplinary Problems     SLP Goals        Problem: SLP Goal    Goal Priority Disciplines Outcome   SLP Goal     SLP    Description:  Speech Language Pathology Goals  Goals expected to be met by 6/14    1. Pt will tolerate PMSV w/ VSS and O2 sats greater than 95% during all waking hours   2. Pt and family members will demonstrated independence with PMSV use and care                       Plan:     · Patient to be seen:  4 x/week   · Plan of Care expires:  07/06/19  · Plan of Care reviewed with:      · SLP Follow-Up:          Discharge recommendations:  outpatient speech therapy   Barriers to Discharge:  Level of Skilled Assistance Needed      Time Tracking:     SLP Treatment Date:   06/07/19  Speech Start Time:  0924  Speech Stop Time:  0948     Speech Total Time (min):  24 min    Billable Minutes: Eval 24     Holly Bailey CCC-SLP  06/07/2019

## 2019-06-07 NOTE — ASSESSMENT & PLAN NOTE
64 y.o. 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    Neuro:   - AAOx4  - PRN dilaudid, oxy, and tylenol     Pulmonary:   Tracheostomy   - trach collar with SLP c/s for passy anya valve  - inhalation treatment q6hr prn  - wean supplemental O2 requirements as tolerated  - cont to monitor respiratory status     Cardiac:   - HDS  - continue to monitor     Renal:    - Andersen removed, now with Coude cath  - Good UOP, monitor  - Monitor BUN/Cr     ID:   - Afebrile  - Monitor WBC  - Perioperative unasyn complete     Hem/Onc:   - monitor H/H  - transfuse PRN     Endocrine:    - monitor BG     Fluids/Electrolytes/Nutrition/GI:   Moderate Malnutrition  - NPO  - Replace electrolytes PRN  - On continuous TF at 20, adv 10 cc q4hr to goal 45 cc/hr  - TF's (Impact Peptide)   - Home protonix suspension   - Trend LFT's    F: impact peptide 45 cc/hr  A: PRN dilaudid, tylenol, oxy  S: N/A  T: Lovenox  H: 30 degrees, OOBTC  U: home protonix  G: monitor BG    PT/OT recommend SNF for discharge     DISPO:  - stepdown per primary, awaiting bed for days  - flap checks q4h  - advance tube feeds to goal 45  - OOB/PT/OT

## 2019-06-07 NOTE — PROGRESS NOTES
[facility]  Otorhinolaryngology-Head & Neck Surgery  Progress Note    Subjective:     Post-Op Info:  Procedure(s) (LRB):  MANDIBULECTOMY (Right)  DISSECTION, NECK (Right)  CREATION, FREE FLAP (Right)  EGD, WITH PEG TUBE INSERTION  TRACHEOTOMY (N/A)   8 Days Post-Op  Hospital Day: 9     Interval History:  NAEON.  Did not get PMV trial yesterday.    Medications:  Continuous Infusions:    Scheduled Meds:   enoxaparin  40 mg Subcutaneous Daily    pantoprazole  40 mg Per G Tube Daily     PRN Meds:acetaminophen, albuterol-ipratropium, HYDROmorphone, ondansetron, oxyCODONE, promethazine (PHENERGAN) IVPB     Review of patient's allergies indicates:   Allergen Reactions    Bactrim [sulfamethoxazole-trimethoprim]     Clindamycin     Keflex [cephalexin]      Tolerated Unasyn 05/31/2019    Lortab [hydrocodone-acetaminophen]     Tramadol      DIZZY AND NAUSEA, & VOMITTING     Objective:     Vital Signs (24h Range):  Temp:  [96.8 °F (36 °C)-98.2 °F (36.8 °C)] 97.4 °F (36.3 °C)  Pulse:  [] 76  Resp:  [15-40] 25  SpO2:  [96 %-100 %] 100 %  BP: ()/(45-67) 103/51     Date 06/07/19 0700 - 06/08/19 0659   Shift 8586-4665 1579-4323 7966-9714 24 Hour Total   INTAKE   NG/   110   Shift Total(mL/kg) 110(1.3)   110(1.3)   OUTPUT   Urine(mL/kg/hr) 150   150   Drains 6   6   Shift Total(mL/kg) 156(1.9)   156(1.9)   Weight (kg) 82.3 82.3 82.3 82.3     Lines/Drains/Airways     Drain                 Gastrostomy/Enterostomy 05/30/19 1140 Percutaneous endoscopic gastrostomy (PEG) LUQ feeding 8 days         Closed/Suction Drain 05/30/19 2024 Right Leg Bulb 15 Fr. 7 days         Closed/Suction Drain 05/30/19 2024 Right Neck Bulb 15 Fr. 7 days    Female External Urinary Catheter 06/04/19 1730 2 days          Airway                 Surgical Airway 06/06/19 0800 Shiley Uncuffed 1 day          Peripheral Intravenous Line                 Peripheral IV - Single Lumen 06/01/19 1256 20 G;1 3/4 in Right Forearm 5  days              Physical Exam     Awake, appropriate affect, follows commands  Normocephalic  Oral cavity with fibula paddle in place to R buccal mucosa/retromolar trigone.  Strong arterial and venous signal, color approximates leg skin.    Tongue mobile  Neck incision with staple line intact  Neck RENETTA drain with serosanguinous output - 17cc/24h  Tracheostomy in place 6 DCFS   R leg skin graft appears to be healing well.  RENETTA with serosanguinous output, 10-cc       Significant Labs:  BMP:   Recent Labs   Lab 06/07/19  0725     107     104   CO2 25  25   BUN 18  18   CREATININE 0.7  0.7   CALCIUM 9.1  9.1   MG 1.9  1.9     CBC:   Recent Labs   Lab 06/07/19  0725   WBC 8.58   RBC 3.31*   HGB 9.7*   HCT 30.8*      MCV 93   MCH 29.3   MCHC 31.5*       Significant Diagnostics:  I have reviewed all pertinent imaging results/findings within the past 24 hours.    Assessment/Plan:     Malignant neoplasm of oral cavity  POD 8 - Composite resection right oral cavity and right mandible, right modified radical neck dissection, right fibular free flap reconstruction, split thickness skin graft from right thigh, tracheostomy, PEG placement.     PMSV - will do capping trial if she tolerates this  Routine fresh trach care, humidified O2 via trach collar  Bolus TFs as tolerated   Continue Q4H flap checks and RLE neuro checks.    PT/OT/Nutrition consulted  Awaiting floor bed        Reuben Ledesma MD  Otorhinolaryngology-Head & Neck Surgery  [facility]

## 2019-06-07 NOTE — PROGRESS NOTES
[facility]  Critical Care - Surgery  Progress Note    Patient Name: Jennifer Andre  MRN: [id number]  Admission Date: 5/30/2019  Hospital Length of Stay: 8 days  Code Status: Full Code  Attending Provider: Wallace Santiago MD  Primary Care Provider: Marcello Benavidez MD   Principal Problem: Squamous cell carcinoma    Subjective:     Hospital/ICU Course:  No notes on file    Interval History/Significant Events: NAEON. Vitals remain stable in normal range. On 4.5-5L trach collar. Sats high 90s. .  TF at 20. Reported nausea with TF yesterday.    Follow-up For: Procedure(s) (LRB):  MANDIBULECTOMY (Right)  DISSECTION, NECK (Right)  CREATION, FREE FLAP (Right)  EGD, WITH PEG TUBE INSERTION  TRACHEOTOMY (N/A)    Surgery date: 05/30/19    Objective:     Vital Signs (Most Recent):  Temp: 97.4 °F (36.3 °C) (06/07/19 0700)  Pulse: 76 (06/07/19 0800)  Resp: (!) 25 (06/07/19 0800)  BP: (!) 103/51 (06/07/19 0800)  SpO2: 100 % (06/07/19 0800) Vital Signs (24h Range):  Temp:  [96.8 °F (36 °C)-98.2 °F (36.8 °C)] 97.4 °F (36.3 °C)  Pulse:  [] 76  Resp:  [15-40] 25  SpO2:  [95 %-100 %] 100 %  BP: ()/(45-67) 103/51     Weight: 82.3 kg (181 lb 7 oz)  Body mass index is 32.14 kg/m².      Intake/Output Summary (Last 24 hours) at 6/7/2019 0851  Last data filed at 6/7/2019 0800  Gross per 24 hour   Intake 855 ml   Output 975 ml   Net -120 ml       Physical Exam   Constitutional: She is oriented to person, place, and time. She appears well-developed and well-nourished. No distress.   HENT:   Neck incision clean/dry/intact   Flap with good cap refill  R neck RENETTA in place draining serosanguinous   Eyes: No scleral icterus.   Cardiovascular: Normal rate and regular rhythm.   Pulmonary/Chest: Effort normal. No respiratory distress.   Abdominal: Soft. She exhibits no distension.   Musculoskeletal:   R thigh skin graft donor site clean with no signs of infection  R leg RENETTA in place draining serosanguinous  R  leg bandage in place   Neurological: She is alert and oriented to person, place, and time.   Skin: Skin is warm and dry.   Psychiatric: She has a normal mood and affect. Thought content normal.   Vitals reviewed.      Vents:  Oxygen Concentration (%): 28 (06/07/19 0800)    Lines/Drains/Airways     Drain                 Closed/Suction Drain 05/30/19 2024 Right Leg Bulb 15 Fr. 7 days         Closed/Suction Drain 05/30/19 2024 Right Neck Bulb 15 Fr. 7 days         Gastrostomy/Enterostomy 05/30/19 1140 Percutaneous endoscopic gastrostomy (PEG) LUQ feeding 7 days    Female External Urinary Catheter 06/04/19 1730 2 days          Airway                 Surgical Airway 06/06/19 0800 Shiley Uncuffed 1 day          Peripheral Intravenous Line                 Peripheral IV - Single Lumen 06/01/19 1256 20 G;1 3/4 in Right Forearm 5 days                Significant Labs:    CBC/Anemia Profile:  Recent Labs   Lab 06/06/19  0416 06/07/19  0725   WBC 8.89  8.89 8.58   HGB 9.8*  9.8* 9.7*   HCT 30.6*  30.6* 30.8*     237 242   MCV 92  92 93   RDW 14.1  14.1 14.1        Chemistries:  Recent Labs   Lab 06/06/19  0416 06/07/19  0725    136  136   K 3.8 3.6  3.6    104  104   CO2 23 25  25   BUN 20 18  18   CREATININE 0.6 0.7  0.7   CALCIUM 8.7 9.1  9.1   ALBUMIN 2.4* 2.6*  2.6*   PROT 6.2 6.4  6.4   BILITOT 0.3 0.4  0.4   ALKPHOS 74 67  67   ALT 67* 51*  51*   AST 56* 30  30   MG 1.8 1.9  1.9   PHOS 4.0 3.5  3.5       All pertinent labs within the past 24 hours have been reviewed.    Significant Imaging:  I have reviewed all pertinent imaging results/findings within the past 24 hours.    Assessment/Plan:     * Squamous cell carcinoma  64 y.o. 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    Neuro:   - AAOx4  -  PRN dilaudid, oxy, and tylenol     Pulmonary:   Tracheostomy   - trach collar with SLP c/s for passy anya valve  - inhalation treatment q6hr prn  - wean supplemental O2 requirements as tolerated  - cont to monitor respiratory status     Cardiac:   - HDS  - continue to monitor     Renal:    - Andersen removed, now with Coude cath  - Good UOP, monitor  - Monitor BUN/Cr     ID:   - Afebrile  - Monitor WBC  - Perioperative unasyn complete     Hem/Onc:   - monitor H/H  - transfuse PRN     Endocrine:    - monitor BG     Fluids/Electrolytes/Nutrition/GI:   Moderate Malnutrition  - NPO  - Replace electrolytes PRN  - On continuous TF at 20, adv 10 cc q4hr to goal 45 cc/hr  - TF's (Impact Peptide)   - Home protonix suspension   - Trend LFT's    F: impact peptide 45 cc/hr  A: PRN dilaudid, tylenol, oxy  S: N/A  T: Lovenox  H: 30 degrees, OOBTC  U: home protonix  G: monitor BG    PT/OT recommend SNF for discharge     DISPO:  - stepdown per primary, awaiting bed for days  - flap checks q4h  - advance tube feeds to goal 45  - OOB/PT/OT              Critical care was time spent personally by me on the following activities: development of treatment plan with patient or surrogate and bedside caregivers, discussions with consultants, evaluation of patient's response to treatment, examination of patient, ordering and performing treatments and interventions, ordering and review of laboratory studies, ordering and review of radiographic studies, pulse oximetry, re-evaluation of patient's condition.  This critical care time did not overlap with that of any other provider or involve time for any procedures.     Danie De Souza MD  Critical Care - Surgery  [facility]

## 2019-06-08 LAB
ALBUMIN SERPL BCP-MCNC: 2.4 G/DL (ref 3.5–5.2)
ALP SERPL-CCNC: 67 U/L (ref 55–135)
ALT SERPL W/O P-5'-P-CCNC: 34 U/L (ref 10–44)
ANION GAP SERPL CALC-SCNC: 9 MMOL/L (ref 8–16)
AST SERPL-CCNC: 18 U/L (ref 10–40)
BASOPHILS # BLD AUTO: 0.07 K/UL (ref 0–0.2)
BASOPHILS NFR BLD: 0.9 % (ref 0–1.9)
BILIRUB SERPL-MCNC: 0.3 MG/DL (ref 0.1–1)
BUN SERPL-MCNC: 22 MG/DL (ref 8–23)
CALCIUM SERPL-MCNC: 8.8 MG/DL (ref 8.7–10.5)
CHLORIDE SERPL-SCNC: 105 MMOL/L (ref 95–110)
CO2 SERPL-SCNC: 23 MMOL/L (ref 23–29)
CREAT SERPL-MCNC: 0.7 MG/DL (ref 0.5–1.4)
DIFFERENTIAL METHOD: ABNORMAL
EOSINOPHIL # BLD AUTO: 0 K/UL (ref 0–0.5)
EOSINOPHIL NFR BLD: 0 % (ref 0–8)
ERYTHROCYTE [DISTWIDTH] IN BLOOD BY AUTOMATED COUNT: 14.2 % (ref 11.5–14.5)
EST. GFR  (AFRICAN AMERICAN): >60 ML/MIN/1.73 M^2
EST. GFR  (NON AFRICAN AMERICAN): >60 ML/MIN/1.73 M^2
GLUCOSE SERPL-MCNC: 115 MG/DL (ref 70–110)
HCT VFR BLD AUTO: 28.9 % (ref 37–48.5)
HGB BLD-MCNC: 9 G/DL (ref 12–16)
IMM GRANULOCYTES # BLD AUTO: 0.05 K/UL (ref 0–0.04)
IMM GRANULOCYTES NFR BLD AUTO: 0.7 % (ref 0–0.5)
LYMPHOCYTES # BLD AUTO: 1 K/UL (ref 1–4.8)
LYMPHOCYTES NFR BLD: 12.5 % (ref 18–48)
MAGNESIUM SERPL-MCNC: 1.9 MG/DL (ref 1.6–2.6)
MCH RBC QN AUTO: 29.7 PG (ref 27–31)
MCHC RBC AUTO-ENTMCNC: 31.1 G/DL (ref 32–36)
MCV RBC AUTO: 95 FL (ref 82–98)
MONOCYTES # BLD AUTO: 0.6 K/UL (ref 0.3–1)
MONOCYTES NFR BLD: 8.4 % (ref 4–15)
NEUTROPHILS # BLD AUTO: 5.9 K/UL (ref 1.8–7.7)
NEUTROPHILS NFR BLD: 77.5 % (ref 38–73)
NRBC BLD-RTO: 0 /100 WBC
PHOSPHATE SERPL-MCNC: 4 MG/DL (ref 2.7–4.5)
PLATELET # BLD AUTO: 246 K/UL (ref 150–350)
PMV BLD AUTO: 10.6 FL (ref 9.2–12.9)
POTASSIUM SERPL-SCNC: 3.9 MMOL/L (ref 3.5–5.1)
PROT SERPL-MCNC: 6.2 G/DL (ref 6–8.4)
RBC # BLD AUTO: 3.03 M/UL (ref 4–5.4)
SODIUM SERPL-SCNC: 137 MMOL/L (ref 136–145)
WBC # BLD AUTO: 7.62 K/UL (ref 3.9–12.7)

## 2019-06-08 PROCEDURE — 25000003 PHARM REV CODE 250: Performed by: STUDENT IN AN ORGANIZED HEALTH CARE EDUCATION/TRAINING PROGRAM

## 2019-06-08 PROCEDURE — 63600175 PHARM REV CODE 636 W HCPCS: Performed by: STUDENT IN AN ORGANIZED HEALTH CARE EDUCATION/TRAINING PROGRAM

## 2019-06-08 PROCEDURE — 20600001 HC STEP DOWN PRIVATE ROOM

## 2019-06-08 PROCEDURE — 31720 CLEARANCE OF AIRWAYS: CPT

## 2019-06-08 PROCEDURE — 25000242 PHARM REV CODE 250 ALT 637 W/ HCPCS: Performed by: STUDENT IN AN ORGANIZED HEALTH CARE EDUCATION/TRAINING PROGRAM

## 2019-06-08 PROCEDURE — 83735 ASSAY OF MAGNESIUM: CPT

## 2019-06-08 PROCEDURE — 99900035 HC TECH TIME PER 15 MIN (STAT)

## 2019-06-08 PROCEDURE — 36415 COLL VENOUS BLD VENIPUNCTURE: CPT

## 2019-06-08 PROCEDURE — 94761 N-INVAS EAR/PLS OXIMETRY MLT: CPT

## 2019-06-08 PROCEDURE — 84100 ASSAY OF PHOSPHORUS: CPT

## 2019-06-08 PROCEDURE — 27000221 HC OXYGEN, UP TO 24 HOURS

## 2019-06-08 PROCEDURE — 85025 COMPLETE CBC W/AUTO DIFF WBC: CPT

## 2019-06-08 PROCEDURE — 94640 AIRWAY INHALATION TREATMENT: CPT

## 2019-06-08 PROCEDURE — 80053 COMPREHEN METABOLIC PANEL: CPT

## 2019-06-08 RX ORDER — ONDANSETRON 4 MG/1
8 TABLET, FILM COATED ORAL EVERY 8 HOURS PRN
Status: DISCONTINUED | OUTPATIENT
Start: 2019-06-08 | End: 2019-06-11 | Stop reason: HOSPADM

## 2019-06-08 RX ADMIN — IPRATROPIUM BROMIDE AND ALBUTEROL SULFATE 3 ML: .5; 3 SOLUTION RESPIRATORY (INHALATION) at 08:06

## 2019-06-08 RX ADMIN — OXYCODONE HYDROCHLORIDE 7.5 MG: 5 SOLUTION ORAL at 02:06

## 2019-06-08 RX ADMIN — PANTOPRAZOLE SODIUM 40 MG: 40 GRANULE, DELAYED RELEASE ORAL at 10:06

## 2019-06-08 RX ADMIN — ENOXAPARIN SODIUM 40 MG: 100 INJECTION SUBCUTANEOUS at 04:06

## 2019-06-08 RX ADMIN — IPRATROPIUM BROMIDE AND ALBUTEROL SULFATE 3 ML: .5; 3 SOLUTION RESPIRATORY (INHALATION) at 03:06

## 2019-06-08 RX ADMIN — ONDANSETRON 8 MG: 4 TABLET, FILM COATED ORAL at 03:06

## 2019-06-08 NOTE — NURSING TRANSFER
Nursing Transfer Note      6/7/2019     Transfer To: [facility]    Transfer via wheelchair    Transfer with O2, cardiac monitoring    Transported by RN    Medicines sent: none    Chart send with patient: Yes    Notified: daughter    Patient reassessed at: 06/07/19 1700    Upon arrival to floor: cardiac monitor applied, patient oriented to room, call bell in reach and bed in lowest position

## 2019-06-09 PROCEDURE — 25000003 PHARM REV CODE 250: Performed by: STUDENT IN AN ORGANIZED HEALTH CARE EDUCATION/TRAINING PROGRAM

## 2019-06-09 PROCEDURE — 20600001 HC STEP DOWN PRIVATE ROOM

## 2019-06-09 PROCEDURE — 63600175 PHARM REV CODE 636 W HCPCS: Performed by: STUDENT IN AN ORGANIZED HEALTH CARE EDUCATION/TRAINING PROGRAM

## 2019-06-09 PROCEDURE — 99900035 HC TECH TIME PER 15 MIN (STAT)

## 2019-06-09 PROCEDURE — 94761 N-INVAS EAR/PLS OXIMETRY MLT: CPT

## 2019-06-09 PROCEDURE — 27000221 HC OXYGEN, UP TO 24 HOURS

## 2019-06-09 PROCEDURE — 31720 CLEARANCE OF AIRWAYS: CPT

## 2019-06-09 RX ORDER — LIDOCAINE HYDROCHLORIDE 20 MG/ML
2 INJECTION, SOLUTION INFILTRATION; PERINEURAL ONCE
Status: DISCONTINUED | OUTPATIENT
Start: 2019-06-09 | End: 2019-06-11 | Stop reason: HOSPADM

## 2019-06-09 RX ADMIN — ONDANSETRON 8 MG: 4 TABLET, FILM COATED ORAL at 10:06

## 2019-06-09 RX ADMIN — OXYCODONE HYDROCHLORIDE 7.5 MG: 5 SOLUTION ORAL at 06:06

## 2019-06-09 RX ADMIN — PANTOPRAZOLE SODIUM 40 MG: 40 GRANULE, DELAYED RELEASE ORAL at 10:06

## 2019-06-09 RX ADMIN — ENOXAPARIN SODIUM 40 MG: 100 INJECTION SUBCUTANEOUS at 04:06

## 2019-06-09 RX ADMIN — ONDANSETRON 8 MG: 4 TABLET, FILM COATED ORAL at 12:06

## 2019-06-09 RX ADMIN — ONDANSETRON 8 MG: 4 TABLET, FILM COATED ORAL at 08:06

## 2019-06-09 NOTE — PLAN OF CARE
Problem: Adult Inpatient Plan of Care  Goal: Plan of Care Review  Outcome: Ongoing (interventions implemented as appropriate)  POC reviewed with Pt and family, verbalized understanding. Pt AAOx4. VSS on 5L and 28%. Pt is 1 assist, gets up to bedside commode with adequate UOP. Pt has R neck with staples, RENETTA drain. R thigh and leg, with RENETTA drain. Pt NPO with tube feedings peptide 1.5. Flap checks q4h, neuro vascular q6h. Pain and nausea managed with PRN meds. In early morning pt complained of having problems breathing, changed inner-cannula twice during shift d/t thick secretions, pt felt relief after changing inner cannula. Will continue to monitor.

## 2019-06-09 NOTE — PROGRESS NOTES
[facility]  Otorhinolaryngology-Head & Neck Surgery  Progress Note    Subjective:     Post-Op Info:  Procedure(s) (LRB):  MANDIBULECTOMY (Right)  DISSECTION, NECK (Right)  CREATION, FREE FLAP (Right)  EGD, WITH PEG TUBE INSERTION  TRACHEOTOMY (N/A)   9 Days Post-Op  Hospital Day: 10     Interval History: NAEON. Pain controlled and tolerating TF's. Some thick secretions per trach noted, but tolerating PSMV    Medications:  Continuous Infusions:  Scheduled Meds:   enoxaparin  40 mg Subcutaneous Daily    pantoprazole  40 mg Per G Tube Daily     PRN Meds:acetaminophen, albuterol-ipratropium, HYDROmorphone, ondansetron, ondansetron, oxyCODONE, promethazine (PHENERGAN) IVPB     Review of patient's allergies indicates:   Allergen Reactions    Bactrim [sulfamethoxazole-trimethoprim]     Clindamycin     Keflex [cephalexin]      Tolerated Unasyn 05/31/2019    Lortab [hydrocodone-acetaminophen]     Tramadol      DIZZY AND NAUSEA, & VOMITTING     Objective:     Vital Signs (24h Range):  Temp:  [97.6 °F (36.4 °C)-99 °F (37.2 °C)] 97.6 °F (36.4 °C)  Pulse:  [70-93] 78  Resp:  [16-22] 16  SpO2:  [94 %-100 %] 97 %  BP: (103-142)/(49-62) 103/49     Date 06/08/19 0700 - 06/09/19 0659   Shift 4767-8411 0311-0361 6969-5961 24 Hour Total   INTAKE   NG/ 488  918   Shift Total(mL/kg) 430(5.2) 488(5.9)  918(11.2)   OUTPUT   Urine(mL/kg/hr) 850(1.3)   850   Drains 45 20  65   Shift Total(mL/kg) 895(10.9) 20(0.2)  915(11.1)   Weight (kg) 82.3 82.3 82.3 82.3     Lines/Drains/Airways     Drain                 Closed/Suction Drain 05/30/19 2024 Right Leg Bulb 15 Fr. 9 days         Closed/Suction Drain 05/30/19 2024 Right Neck Bulb 15 Fr. 9 days         Gastrostomy/Enterostomy 05/30/19 1140 Percutaneous endoscopic gastrostomy (PEG) LUQ feeding 9 days    Female External Urinary Catheter 06/04/19 1730 4 days          Airway                 Surgical Airway 06/06/19 0800 Subha Uncuffed 2 days          Peripheral  Intravenous Line                 Peripheral IV - Single Lumen 06/01/19 1256 20 G;1 3/4 in Right Forearm 7 days                Physical Exam  Awake, appropriate affect, follows commands  Normocephalic  Oral cavity with fibula paddle in place to R buccal mucosa/retromolar trigone.  Strong arterial and venous signal, color approximates leg skin.    Tongue mobile  Neck incision with staple line intact  Neck RENETTA drain with serosanguinous output - 5cc/24h  Tracheostomy in place 6 DCFS   R leg skin graft appears to be healing well.  RENETTA with serosanguinous output, 15-cc         Significant Labs:  CBC:   Recent Labs   Lab 06/08/19  0325   WBC 7.62   RBC 3.03*   HGB 9.0*   HCT 28.9*      MCV 95   MCH 29.7   MCHC 31.1*     CMP:   Recent Labs   Lab 06/08/19  0325   *   CALCIUM 8.8   ALBUMIN 2.4*   PROT 6.2      K 3.9   CO2 23      BUN 22   CREATININE 0.7   ALKPHOS 67   ALT 34   AST 18   BILITOT 0.3       Significant Diagnostics:  None    Assessment/Plan:     Malignant neoplasm of oral cavity  POD 9 - Composite resection right oral cavity and right mandible, right modified radical neck dissection, right fibular free flap reconstruction, split thickness skin graft from right thigh, tracheostomy, PEG placement.     Tolerating PMSV - will do capping trial tmrw  Routine fresh trach care, humidified O2 via trach collar  Cont TFs as tolerated   Continue Q4H flap checks and RLE neuro checks.    PT/OT/Nutrition consulted  Anticipate d/c Monday        Danie Bowie MD  Otorhinolaryngology-Head & Neck Surgery  [facility]

## 2019-06-09 NOTE — ASSESSMENT & PLAN NOTE
POD 10 - Composite resection right oral cavity and right mandible, right modified radical neck dissection, right fibular free flap reconstruction, split thickness skin graft from right thigh, tracheostomy, PEG placement.     Tolerating PMSV - will do capping trial  today and tonight  Routine fresh trach care, humidified O2 via trach collar  Cont TFs as tolerated   Continue Q4H flap checks and RLE neuro checks.    [place] drains before discharge  PT/OT/Nutrition consulted  Anticipate d/c Monday or Tuesday

## 2019-06-09 NOTE — PROGRESS NOTES
Trach capped by MD.  Patient placed on 3L NC.  No SOB noted at this time.  Patient will continued to be monitored.

## 2019-06-09 NOTE — PROGRESS NOTES
Pt vomited out of mouth during trach sx. Nurse was called to assist. ENT contacted for consult and CXR ordered to rule out aspiration

## 2019-06-09 NOTE — ASSESSMENT & PLAN NOTE
POD 9 - Composite resection right oral cavity and right mandible, right modified radical neck dissection, right fibular free flap reconstruction, split thickness skin graft from right thigh, tracheostomy, PEG placement.     Tolerating PMSV - will do capping trial tmrw  Routine fresh trach care, humidified O2 via trach collar  Cont TFs as tolerated   Continue Q4H flap checks and RLE neuro checks.    PT/OT/Nutrition consulted  Anticipate d/c Monday

## 2019-06-09 NOTE — NURSING
"Pt refused morning labs. Pt states, "I don't want to be stuck anymore. My veins blow and it hurts too bad." Pt still has no IV assess. Pt refuses a PIV. Pt is okay with midline or PICC team. Midline consult placed, 6/8. MD notified and aware of IV and lab refusal.  "

## 2019-06-09 NOTE — PLAN OF CARE
Problem: Adult Inpatient Plan of Care  Goal: Plan of Care Review  Outcome: Ongoing (interventions implemented as appropriate)  Pt is A&Ox4 injury free. Pt did throw up once during the night shift at midnight while the raspatory therapist was suctioning the pt.  Tube feeding residual was checked and the nurse pulled back 30 cc and then reinserted it. The tube feeding was stopped until 0200 and then restarted. The on call Dr Bowie was notified and a order for a xray to check for aspiration was made.  The was coughing several different times during the night thick yellow mucase. Pt's family member stayed with the pt during the night shift. Flap checks were performed during the night shift and a strong pulse was detected, the pt was also able to here the pulse. The pt called the night nurse into her room at 0030 to shoe then that the right neck RENETTA drain was out and sitting in the chair, the night nurse called the on call  for ENT.

## 2019-06-09 NOTE — SUBJECTIVE & OBJECTIVE
Interval History: NAEON. Pain controlled and tolerating TF's. Some thick secretions per trach noted, but tolerating PSMV    Medications:  Continuous Infusions:  Scheduled Meds:   enoxaparin  40 mg Subcutaneous Daily    pantoprazole  40 mg Per G Tube Daily     PRN Meds:acetaminophen, albuterol-ipratropium, HYDROmorphone, ondansetron, ondansetron, oxyCODONE, promethazine (PHENERGAN) IVPB     Review of patient's allergies indicates:   Allergen Reactions    Bactrim [sulfamethoxazole-trimethoprim]     Clindamycin     Keflex [cephalexin]      Tolerated Unasyn 05/31/2019    Lortab [hydrocodone-acetaminophen]     Tramadol      DIZZY AND NAUSEA, & VOMITTING     Objective:     Vital Signs (24h Range):  Temp:  [97.6 °F (36.4 °C)-99 °F (37.2 °C)] 97.6 °F (36.4 °C)  Pulse:  [70-93] 78  Resp:  [16-22] 16  SpO2:  [94 %-100 %] 97 %  BP: (103-142)/(49-62) 103/49     Date 06/08/19 0700 - 06/09/19 0659   Shift 1889-7089 4993-9340 2552-7106 24 Hour Total   INTAKE   NG/ 488  918   Shift Total(mL/kg) 430(5.2) 488(5.9)  918(11.2)   OUTPUT   Urine(mL/kg/hr) 850(1.3)   850   Drains 45 20  65   Shift Total(mL/kg) 895(10.9) 20(0.2)  915(11.1)   Weight (kg) 82.3 82.3 82.3 82.3     Lines/Drains/Airways     Drain                 Closed/Suction Drain 05/30/19 2024 Right Leg Bulb 15 Fr. 9 days         Closed/Suction Drain 05/30/19 2024 Right Neck Bulb 15 Fr. 9 days         Gastrostomy/Enterostomy 05/30/19 1140 Percutaneous endoscopic gastrostomy (PEG) LUQ feeding 9 days    Female External Urinary Catheter 06/04/19 1730 4 days          Airway                 Surgical Airway 06/06/19 0800 Shiley Uncuffed 2 days          Peripheral Intravenous Line                 Peripheral IV - Single Lumen 06/01/19 1256 20 G;1 3/4 in Right Forearm 7 days                Physical Exam  Awake, appropriate affect, follows commands  Normocephalic  Oral cavity with fibula paddle in place to R buccal mucosa/retromolar trigone.  Strong arterial and venous  signal, color approximates leg skin.    Tongue mobile  Neck incision with staple line intact  Neck RENETTA drain with serosanguinous output - 5cc/24h  Tracheostomy in place 6 DCFS   R leg skin graft appears to be healing well.  RENETTA with serosanguinous output, 15-cc         Significant Labs:  CBC:   Recent Labs   Lab 06/08/19  0325   WBC 7.62   RBC 3.03*   HGB 9.0*   HCT 28.9*      MCV 95   MCH 29.7   MCHC 31.1*     CMP:   Recent Labs   Lab 06/08/19  0325   *   CALCIUM 8.8   ALBUMIN 2.4*   PROT 6.2      K 3.9   CO2 23      BUN 22   CREATININE 0.7   ALKPHOS 67   ALT 34   AST 18   BILITOT 0.3       Significant Diagnostics:  None

## 2019-06-09 NOTE — PROGRESS NOTES
[facility]  Otorhinolaryngology-Head & Neck Surgery  Progress Note    Subjective:     Post-Op Info:  Procedure(s) (LRB):  MANDIBULECTOMY (Right)  DISSECTION, NECK (Right)  CREATION, FREE FLAP (Right)  EGD, WITH PEG TUBE INSERTION  TRACHEOTOMY (N/A)   10 Days Post-Op  Hospital Day: 11     Interval History: Nausea and emesis x 1 overnight. Breathing well and good with trach cap this AM.    Medications:  Continuous Infusions:  Scheduled Meds:   enoxaparin  40 mg Subcutaneous Daily    lidocaine HCL 20 mg/ml (2%)  2 mL Intradermal Once    pantoprazole  40 mg Per G Tube Daily     PRN Meds:acetaminophen, albuterol-ipratropium, HYDROmorphone, ondansetron, ondansetron, oxyCODONE, promethazine (PHENERGAN) IVPB     Review of patient's allergies indicates:   Allergen Reactions    Bactrim [sulfamethoxazole-trimethoprim]     Clindamycin     Keflex [cephalexin]      Tolerated Unasyn 05/31/2019    Lortab [hydrocodone-acetaminophen]     Tramadol      DIZZY AND NAUSEA, & VOMITTING     Objective:     Vital Signs (24h Range):  Temp:  [97.6 °F (36.4 °C)-99.3 °F (37.4 °C)] 98.2 °F (36.8 °C)  Pulse:  [68-93] 80  Resp:  [16-27] 16  SpO2:  [94 %-100 %] 100 %  BP: (103-129)/(49-58) 129/58       Lines/Drains/Airways     Drain                 Gastrostomy/Enterostomy 05/30/19 1140 Percutaneous endoscopic gastrostomy (PEG) LUQ feeding 10 days         Closed/Suction Drain 05/30/19 2024 Right Leg Bulb 15 Fr. 9 days    Female External Urinary Catheter 06/04/19 1730 4 days          Airway                 Surgical Airway 06/06/19 0800 Shiley Uncuffed 3 days                Physical Exam  Awake, appropriate affect, follows commands  Normocephalic  Oral cavity with fibula paddle in place to R buccal mucosa/retromolar trigone.  Strong arterial and venous signal, color approximates leg skin.    Tongue mobile  Neck incision with staple line intact  Neck RENETTA drain with serosanguinous output - 5cc/24h  Tracheostomy in place 6 DCFS  - capped   R leg skin graft appears to be healing well.  RENETTA with serosanguinous output, 15-cc       Significant Labs:  CBC:   Recent Labs   Lab 06/08/19  0325   WBC 7.62   RBC 3.03*   HGB 9.0*   HCT 28.9*      MCV 95   MCH 29.7   MCHC 31.1*     CMP:   Recent Labs   Lab 06/08/19  0325   *   CALCIUM 8.8   ALBUMIN 2.4*   PROT 6.2      K 3.9   CO2 23      BUN 22   CREATININE 0.7   ALKPHOS 67   ALT 34   AST 18   BILITOT 0.3       Significant Diagnostics:  None    Assessment/Plan:     Malignant neoplasm of oral cavity  POD 10 - Composite resection right oral cavity and right mandible, right modified radical neck dissection, right fibular free flap reconstruction, split thickness skin graft from right thigh, tracheostomy, PEG placement.     Tolerating PMSV - will do capping trial  today and tonight  Routine fresh trach care, humidified O2 via trach collar  Cont TFs as tolerated   Continue Q4H flap checks and RLE neuro checks.    [place] drains before discharge  PT/OT/Nutrition consulted  Anticipate d/c Monday or Tuesday        Danie Bowie MD  Otorhinolaryngology-Head & Neck Surgery  [facility]

## 2019-06-09 NOTE — SUBJECTIVE & OBJECTIVE
Interval History: Nausea and emesis x 1 overnight. Breathing well and good with trach cap this AM.    Medications:  Continuous Infusions:  Scheduled Meds:   enoxaparin  40 mg Subcutaneous Daily    lidocaine HCL 20 mg/ml (2%)  2 mL Intradermal Once    pantoprazole  40 mg Per G Tube Daily     PRN Meds:acetaminophen, albuterol-ipratropium, HYDROmorphone, ondansetron, ondansetron, oxyCODONE, promethazine (PHENERGAN) IVPB     Review of patient's allergies indicates:   Allergen Reactions    Bactrim [sulfamethoxazole-trimethoprim]     Clindamycin     Keflex [cephalexin]      Tolerated Unasyn 05/31/2019    Lortab [hydrocodone-acetaminophen]     Tramadol      DIZZY AND NAUSEA, & VOMITTING     Objective:     Vital Signs (24h Range):  Temp:  [97.6 °F (36.4 °C)-99.3 °F (37.4 °C)] 98.2 °F (36.8 °C)  Pulse:  [68-93] 80  Resp:  [16-27] 16  SpO2:  [94 %-100 %] 100 %  BP: (103-129)/(49-58) 129/58       Lines/Drains/Airways     Drain                 Gastrostomy/Enterostomy 05/30/19 1140 Percutaneous endoscopic gastrostomy (PEG) LUQ feeding 10 days         Closed/Suction Drain 05/30/19 2024 Right Leg Bulb 15 Fr. 9 days    Female External Urinary Catheter 06/04/19 1730 4 days          Airway                 Surgical Airway 06/06/19 0800 Shiley Uncuffed 3 days                Physical Exam  Awake, appropriate affect, follows commands  Normocephalic  Oral cavity with fibula paddle in place to R buccal mucosa/retromolar trigone.  Strong arterial and venous signal, color approximates leg skin.    Tongue mobile  Neck incision with staple line intact  Neck RENETTA drain with serosanguinous output - 5cc/24h  Tracheostomy in place 6 DCFS - capped   R leg skin graft appears to be healing well.  RENETTA with serosanguinous output, 15-cc       Significant Labs:  CBC:   Recent Labs   Lab 06/08/19  0325   WBC 7.62   RBC 3.03*   HGB 9.0*   HCT 28.9*      MCV 95   MCH 29.7   MCHC 31.1*     CMP:   Recent Labs   Lab 06/08/19  0325   *    CALCIUM 8.8   ALBUMIN 2.4*   PROT 6.2      K 3.9   CO2 23      BUN 22   CREATININE 0.7   ALKPHOS 67   ALT 34   AST 18   BILITOT 0.3       Significant Diagnostics:  None

## 2019-06-10 PROCEDURE — 97116 GAIT TRAINING THERAPY: CPT

## 2019-06-10 PROCEDURE — 27000221 HC OXYGEN, UP TO 24 HOURS

## 2019-06-10 PROCEDURE — 97530 THERAPEUTIC ACTIVITIES: CPT

## 2019-06-10 PROCEDURE — 25000003 PHARM REV CODE 250: Performed by: STUDENT IN AN ORGANIZED HEALTH CARE EDUCATION/TRAINING PROGRAM

## 2019-06-10 PROCEDURE — 63600175 PHARM REV CODE 636 W HCPCS: Performed by: STUDENT IN AN ORGANIZED HEALTH CARE EDUCATION/TRAINING PROGRAM

## 2019-06-10 PROCEDURE — 27100171 HC OXYGEN HIGH FLOW UP TO 24 HOURS

## 2019-06-10 PROCEDURE — 20600001 HC STEP DOWN PRIVATE ROOM

## 2019-06-10 RX ORDER — ONDANSETRON HYDROCHLORIDE 8 MG/1
8 TABLET, FILM COATED ORAL EVERY 8 HOURS PRN
Qty: 8 TABLET | Refills: 1 | Status: SHIPPED | OUTPATIENT
Start: 2019-06-10 | End: 2019-06-18 | Stop reason: SDUPTHER

## 2019-06-10 RX ORDER — PANTOPRAZOLE SODIUM 40 MG/1
40 FOR SUSPENSION ORAL DAILY
Qty: 30 PACKET | Refills: 11 | Status: SHIPPED | OUTPATIENT
Start: 2019-06-11 | End: 2019-06-10 | Stop reason: HOSPADM

## 2019-06-10 RX ORDER — TRIPROLIDINE/PSEUDOEPHEDRINE 2.5MG-60MG
600 TABLET ORAL EVERY 6 HOURS PRN
Status: DISCONTINUED | OUTPATIENT
Start: 2019-06-10 | End: 2019-06-11 | Stop reason: HOSPADM

## 2019-06-10 RX ORDER — OXYCODONE HCL 5 MG/5 ML
7.5 SOLUTION, ORAL ORAL EVERY 4 HOURS PRN
Qty: 473 ML | Refills: 0 | Status: SHIPPED | OUTPATIENT
Start: 2019-06-10 | End: 2022-09-22 | Stop reason: CLARIF

## 2019-06-10 RX ORDER — OXYCODONE HCL 5 MG/5 ML
10 SOLUTION, ORAL ORAL EVERY 4 HOURS PRN
Status: DISCONTINUED | OUTPATIENT
Start: 2019-06-10 | End: 2019-06-11 | Stop reason: HOSPADM

## 2019-06-10 RX ADMIN — ENOXAPARIN SODIUM 40 MG: 100 INJECTION SUBCUTANEOUS at 06:06

## 2019-06-10 RX ADMIN — ACETAMINOPHEN 650 MG: 325 TABLET ORAL at 09:06

## 2019-06-10 RX ADMIN — OXYCODONE HYDROCHLORIDE 7.5 MG: 5 SOLUTION ORAL at 11:06

## 2019-06-10 RX ADMIN — OXYCODONE HYDROCHLORIDE 7.5 MG: 5 SOLUTION ORAL at 03:06

## 2019-06-10 RX ADMIN — ONDANSETRON 8 MG: 4 TABLET, FILM COATED ORAL at 06:06

## 2019-06-10 RX ADMIN — OXYCODONE HYDROCHLORIDE 7.5 MG: 5 SOLUTION ORAL at 08:06

## 2019-06-10 RX ADMIN — PANTOPRAZOLE SODIUM 40 MG: 40 GRANULE, DELAYED RELEASE ORAL at 08:06

## 2019-06-10 RX ADMIN — OXYCODONE HYDROCHLORIDE 7.5 MG: 5 SOLUTION ORAL at 05:06

## 2019-06-10 RX ADMIN — OXYCODONE HYDROCHLORIDE 7.5 MG: 5 SOLUTION ORAL at 12:06

## 2019-06-10 NOTE — PT/OT/SLP PROGRESS
"Physical Therapy Treatment    Patient Name:  Jennifer Andre   MRN:  [id number]    Recommendations:     Discharge Recommendations:  home health PT   Discharge Equipment Recommendations: commode, walker, rolling   Barriers to discharge: Inaccessible home    Assessment:     Jennifer Andre is a 64 y.o. female admitted with a medical diagnosis of Squamous cell carcinoma.  She presents with the following impairments/functional limitations:  gait instability, impaired endurance, impaired balance, impaired functional mobilty, pain .    Rehab Prognosis: Good; patient would benefit from acute skilled PT services to address these deficits and reach maximum level of function.    Recent Surgery: Procedure(s) (LRB):  MANDIBULECTOMY (Right)  DISSECTION, NECK (Right)  CREATION, FREE FLAP (Right)  EGD, WITH PEG TUBE INSERTION  TRACHEOTOMY (N/A) 11 Days Post-Op    Plan:     During this hospitalization, patient to be seen 4 x/week to address the identified rehab impairments via gait training, therapeutic activities, therapeutic exercises and progress toward the following goals:    · Plan of Care Expires:  07/01/19    Subjective   "My stomach hurts" (PT contacted pt's nurse who states she could receive pain meds, then PT returned later in am to work with pt)    Pain/Comfort:  · Pain Rating 1: 10/10("14/10" 1st attempt; 2/10 2nd attempt after pain meds)  · Location - Orientation 1: generalized  · Location 1: abdomen(at PEG tube site)  · Pain Addressed 1: Reposition, Cessation of Activity, Pre-medicate for activity  · Pain Rating Post-Intervention 1: 2/10      Objective:     Communicated with nurse prior to session.  Patient found supine with telemetry, oxygen, PEG Tube, RENETTA drain upon PT entry to room.     General Precautions: Standard, fall   Orthopedic Precautions:N/A   Braces: (walking boot R LE)     Functional Mobility:  · Bed Mobility:     · Supine to Sit: minimum assistance  · Transfers:     · Sit to Stand:  contact guard " assistance with rolling walker  · Transferred bed to bedside chair with no AD with minimal assist  · Gait: 120ft then 145ft with RW with R LE walking boot with CGA/minimal assist with posterior instability initially requiring minimal assist to correct  · Stairs:  Pt ascended/descended 3 stair(s) with RW with right handrail with Minimal Assistance; pt's  and daughter present and educated in assisting pt up/down steps with RW. They expressed understanding.     AM-PAC 6 CLICK MOBILITY  Turning over in bed (including adjusting bedclothes, sheets and blankets)?: 3  Sitting down on and standing up from a chair with arms (e.g., wheelchair, bedside commode, etc.): 3  Moving from lying on back to sitting on the side of the bed?: 3  Moving to and from a bed to a chair (including a wheelchair)?: 3  Need to walk in hospital room?: 3  Climbing 3-5 steps with a railing?: 3  Basic Mobility Total Score: 18       Therapeutic Activities and Exercises:   Pt sat on the EOB ~ 10 min with SBA prior to transfer.    Patient left up in chair with all lines intact, call button in reach, nurse notified and  and daughter present..    GOALS:   Multidisciplinary Problems     Physical Therapy Goals        Problem: Physical Therapy Goal    Goal Priority Disciplines Outcome Goal Variances Interventions   Physical Therapy Goal     PT, PT/OT Ongoing (interventions implemented as appropriate)     Description:  Goals to be met by: 19     Patient will increase functional independence with mobility by performin. Supine to sit with Set-up [place]- not met  2. Sit to supine with Set-up [place] -not met  3. Sit to stand transfer with Minimal Assistance- met   4. Bed to chair transfer with Minimal Assistance using appropriate AD -met   5. Gait  x 50 feet with Minimal Assistance using appropriate AD. - met   6. Lower extremity exercise program x20-30 reps per handout, with independence to improve functional  strength and endurance  7. Pt supervision sit to/from stand. - not met  8. Pt receive gait training ~ 250 ft with RW and supervision- not met                     Time Tracking:     PT Received On: 06/10/19  PT Start Time: 1209     PT Stop Time: 1238  PT Total Time (min): 29 min     Billable Minutes: Gait Training 19 and Therapeutic Activity 10    Treatment Type: Treatment  PT/PTA: PT     PTA Visit Number: 0     Monet Gregg, PT  06/10/2019

## 2019-06-10 NOTE — PROGRESS NOTES
Upon arrival to shift AM tube feeds on hold 2/2 nausea.  Residual checked at 0800 - 0 cc.  Pt reports nausea resolved.  AM med admin per G tube.  Pt requesting to wait another hour before restarting tube feeds. 0930 - tube feeds restarted, pt tolerating well with no nausea complaints at this time.  WCTM.

## 2019-06-10 NOTE — PLAN OF CARE
Problem: Physical Therapy Goal  Goal: Physical Therapy Goal  Goals to be met by: 19     Patient will increase functional independence with mobility by performin. Supine to sit with Set-up [place]- not met  2. Sit to supine with Set-up [place] -not met  3. Sit to stand transfer with Minimal Assistance- met   4. Bed to chair transfer with Minimal Assistance using appropriate AD -met   5. Gait  x 50 feet with Minimal Assistance using appropriate AD. - met   6. Lower extremity exercise program x20-30 reps per handout, with independence to improve functional strength and endurance  7. Pt supervision sit to/from stand. - not met  8. Pt receive gait training ~ 250 ft with RW and supervision- not met    Outcome: Ongoing (interventions implemented as appropriate)  Pt's goals remain appropriate and pt will continue to benefit from skilled PT services to work towards improved functional mobility including: bed mobility, transfers, up/down steps, and gait.   Monet Gregg, PT  6/10/2019

## 2019-06-10 NOTE — ANESTHESIA POSTPROCEDURE EVALUATION
Anesthesia Post Evaluation    Patient: Jennifer Andre    Procedure(s) Performed: Procedure(s) (LRB):  MANDIBULECTOMY (Right)  DISSECTION, NECK (Right)  CREATION, FREE FLAP (Right)  EGD, WITH PEG TUBE INSERTION  TRACHEOTOMY (N/A)    Final Anesthesia Type: general  Patient location during evaluation: ICU  Patient participation: No - Unable to Participate, Other Reason (see comments) (trach, sedated)  Level of consciousness: awake and alert  Post-procedure vital signs: reviewed and stable  Pain management: adequate  Airway patency: patent  PONV status at discharge: No PONV  Anesthetic complications: no      Cardiovascular status: blood pressure returned to baseline  Respiratory status: T-piece (trach in place)  Hydration status: euvolemic  Follow-up not needed.      Please see nursing documentation for vitals upon arrival to ICU    No case tracking events are documented in the log.

## 2019-06-10 NOTE — NURSING
Pt had a syncopy episode after ambulating to bathroom. After sitting getting back in bed pt fainted for approximately 5-7 seconds. VSS, sats >98, denies memory loss or dizziness. MD notified.

## 2019-06-10 NOTE — PT/OT/SLP PROGRESS
Occupational Therapy      Patient Name:  Jennifer Andre   MRN:  [id number]    Patient not seen today secondary to Pain. Pt reported pain at PEG site is a 15/10 when moving. Provided a few options for treatment today but pt declined all. Will follow-up tomorrow.    ALVIN Graf  6/10/2019

## 2019-06-10 NOTE — PT/OT/SLP PROGRESS
Speech Language Pathology      Jennifer Andre  MRN: [id number]    Patient seen in AM and was decannulated this morning by ENT. [place] (laryngectomy) neck breather wristband noted on R wrist. SLP removed wristband and alerted NSG and OC of unit. Both verbalized understanding and concern. Wristbands not stored on unit. Unsure how pt obtained wristband. Education provided.     Betty Ybarra CCC-SLP  6/10/2019

## 2019-06-10 NOTE — PLAN OF CARE
Problem: Adult Inpatient Plan of Care  Goal: Plan of Care Review  Outcome: Ongoing (interventions implemented as appropriate)  Pt is A&Ox4 injury free.   Tube feeding residual was checked and the nurse pulled back 0 cc The tube feeding was  Started at 2000 and ran at 45 cc the entire night shift  pt's family member stayed with the pt during the night shift. Flap checks were performed during the night shift and a strong pulse was detected, the pt was also able to here the pulse. Pain was controlled with medication thru the G tube.

## 2019-06-10 NOTE — PLAN OF CARE
Problem: Adult Inpatient Plan of Care  Goal: Plan of Care Review  Plan of care reviewed with patient who verbalized understanding.  AAOx4.  De cannulated this AM per MD, RR even and unlabored on 2 L NC.  Tele/cont pulse ox monitored continuously.  Ambulated in the halls with PT, boot worn.  Tolerating tube feed at goal rate with no nausea after restarting this AM.  Pt only c/o pain at the site of the G tube.  Pt reports pain relief with PRN medication.  Pt does not have IV access, MDs aware.  Flap/NV to RLE check Q4 - no changes this shift.  Call bell remains within reach.  Bed in lowest position.  Frequent rounds made for pt safety.  Patient remains free of any new or additional falls/injury at this time.  Daughter and spouse at bedside.  VSS, will continue to monitor.

## 2019-06-10 NOTE — PROGRESS NOTES
[facility]  Otorhinolaryngology-Head & Neck Surgery  Progress Note    Subjective:     Post-Op Info:  Procedure(s) (LRB):  MANDIBULECTOMY (Right)  DISSECTION, NECK (Right)  CREATION, FREE FLAP (Right)  EGD, WITH PEG TUBE INSERTION  TRACHEOTOMY (N/A)   11 Days Post-Op  Hospital Day: 12     Interval History: NAEON.  Wore cap overnight without breathing difficulty.    Medications:  Continuous Infusions:  Scheduled Meds:   enoxaparin  40 mg Subcutaneous Daily    lidocaine HCL 20 mg/ml (2%)  2 mL Intradermal Once    pantoprazole  40 mg Per G Tube Daily     PRN Meds:acetaminophen, albuterol-ipratropium, HYDROmorphone, ondansetron, ondansetron, oxyCODONE, promethazine (PHENERGAN) IVPB     Review of patient's allergies indicates:   Allergen Reactions    Bactrim [sulfamethoxazole-trimethoprim]     Clindamycin     Keflex [cephalexin]      Tolerated Unasyn 05/31/2019    Lortab [hydrocodone-acetaminophen]     Tramadol      DIZZY AND NAUSEA, & VOMITTING     Objective:     Vital Signs (24h Range):  Temp:  [97 °F (36.1 °C)-99 °F (37.2 °C)] 97 °F (36.1 °C)  Pulse:  [70-90] 76  Resp:  [16-24] 16  SpO2:  [94 %-100 %] 96 %  BP: (114-138)/(57-71) 114/58       Lines/Drains/Airways     Drain                 Gastrostomy/Enterostomy 05/30/19 1140 Percutaneous endoscopic gastrostomy (PEG) LUQ feeding 10 days    Female External Urinary Catheter 06/04/19 1730 5 days          Airway                 Surgical Airway 06/06/19 0800 Shiley Uncuffed 3 days                Physical Exam    Awake, appropriate affect, follows commands  Normocephalic  Oral cavity with fibula paddle in place to R buccal mucosa/retromolar trigone.  Strong arterial and venous signal, color approximates leg skin.    Tongue mobile  Neck incision with staple line intact  Neck RENETTA drain removed  Tracheostomy removed and occlusive dressing placed  PEG in place  R leg skin graft appears to be healing well.  RENETTA drain removed     Significant Labs:  CBC:    Recent Labs   Lab 06/08/19  0325   WBC 7.62   RBC 3.03*   HGB 9.0*   HCT 28.9*      MCV 95   MCH 29.7   MCHC 31.1*     CMP:   Recent Labs   Lab 06/08/19  0325   *   CALCIUM 8.8   ALBUMIN 2.4*   PROT 6.2      K 3.9   CO2 23      BUN 22   CREATININE 0.7   ALKPHOS 67   ALT 34   AST 18   BILITOT 0.3       Significant Diagnostics:  None    Assessment/Plan:     Malignant neoplasm of oral cavity  POD 11 - Composite resection right oral cavity and right mandible, right modified radical neck dissection, right fibular free flap reconstruction, split thickness skin graft from right thigh, tracheostomy, PEG placement.     Stable for discharge today  Tracheostomy removed.  Keep occlusive dressing in place over stoma, change daily.  Home with bolus TF per nutrition recommendations  Home health PT/OT per recommendations            Reuben Ledesma MD  Otorhinolaryngology-Head & Neck Surgery  [facility]

## 2019-06-10 NOTE — ASSESSMENT & PLAN NOTE
POD 11 - Composite resection right oral cavity and right mandible, right modified radical neck dissection, right fibular free flap reconstruction, split thickness skin graft from right thigh, tracheostomy, PEG placement.     Stable for discharge today  Tracheostomy removed.  Keep occlusive dressing in place over stoma, change daily.  Home with bolus TF per nutrition recommendations  Home health PT/OT per recommendations

## 2019-06-10 NOTE — SUBJECTIVE & OBJECTIVE
Interval History: NAEON.  Wore cap overnight without breathing difficulty.    Medications:  Continuous Infusions:  Scheduled Meds:   enoxaparin  40 mg Subcutaneous Daily    lidocaine HCL 20 mg/ml (2%)  2 mL Intradermal Once    pantoprazole  40 mg Per G Tube Daily     PRN Meds:acetaminophen, albuterol-ipratropium, HYDROmorphone, ondansetron, ondansetron, oxyCODONE, promethazine (PHENERGAN) IVPB     Review of patient's allergies indicates:   Allergen Reactions    Bactrim [sulfamethoxazole-trimethoprim]     Clindamycin     Keflex [cephalexin]      Tolerated Unasyn 05/31/2019    Lortab [hydrocodone-acetaminophen]     Tramadol      DIZZY AND NAUSEA, & VOMITTING     Objective:     Vital Signs (24h Range):  Temp:  [97 °F (36.1 °C)-99 °F (37.2 °C)] 97 °F (36.1 °C)  Pulse:  [70-90] 76  Resp:  [16-24] 16  SpO2:  [94 %-100 %] 96 %  BP: (114-138)/(57-71) 114/58       Lines/Drains/Airways     Drain                 Gastrostomy/Enterostomy 05/30/19 1140 Percutaneous endoscopic gastrostomy (PEG) LUQ feeding 10 days    Female External Urinary Catheter 06/04/19 1730 5 days          Airway                 Surgical Airway 06/06/19 0800 Shiley Uncuffed 3 days                Physical Exam    Awake, appropriate affect, follows commands  Normocephalic  Oral cavity with fibula paddle in place to R buccal mucosa/retromolar trigone.  Strong arterial and venous signal, color approximates leg skin.    Tongue mobile  Neck incision with staple line intact  Neck RENETTA drain removed  Tracheostomy removed and occlusive dressing placed  PEG in place  R leg skin graft appears to be healing well.  RENETTA drain removed     Significant Labs:  CBC:   Recent Labs   Lab 06/08/19  0325   WBC 7.62   RBC 3.03*   HGB 9.0*   HCT 28.9*      MCV 95   MCH 29.7   MCHC 31.1*     CMP:   Recent Labs   Lab 06/08/19  0325   *   CALCIUM 8.8   ALBUMIN 2.4*   PROT 6.2      K 3.9   CO2 23      BUN 22   CREATININE 0.7   ALKPHOS 67   ALT 34   AST 18    BILITOT 0.3       Significant Diagnostics:  None

## 2019-06-10 NOTE — PLAN OF CARE
Problem: Adult Inpatient Plan of Care  Goal: Plan of Care Review  Outcome: Ongoing (interventions implemented as appropriate)  POC reviewed with pt and family, verbalized understanding. AAOx4. Pt VSS on 3L NC, pt capped around 0900, denies SOB, tolerating well. Pt has R neck incision with staples, R thigh wound and leg incision MARQUIS. Pt has LLQ PEG tube continuous tube feeding at 45. Pt toe touch activity on R leg. Flap and Neuro checks q4h. Pt has not IV access, team aware and is okay with this. Pt has syncope episode after getting back in bed from bathroom. Episode lasted about 5-7 seconds, pt denied dizziness, or loss of memory. MD notified and aware. Pain and nausea managed with PRN meds.

## 2019-06-10 NOTE — PROGRESS NOTES
"[facility]  Adult Nutrition  Progress Note    SUMMARY       Recommendations  Recommendation/Intervention:   1. To better meet needs, recommend advancing TF to Impact Peptide 1.5 at a goal rate of 55 mL/hr - to provide 1980 kcal/day, 124g protein/day, and 1016mL free fluid/day.   2. For discharge, recommend Isosource 1.5 at 55 mL/hr - to provide 1980 kcal/day, 90g protein/day, and 1008mL free fluid/day.   -Consider nocturnal TF of Isosource 1.5 at a goal rate of 80 mL/hr x 16 hours - to provide 1920 kcal/day, 87g protein/day, and 978mL free fluid/day.   RD to monitor.    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 Patient with age appropriate muscle wasting with no other physical signs of malnutriton.  Nutrition Discharge Planning: Adequate nutrition via PO intake.    Nutrition Risk Screen  Nutrition Risk Screen: dysphagia or difficulty swallowing, tube feeding or parenteral nutrition    Nutrition/Diet History  Spiritual, Cultural Beliefs, Mormon Practices, Values that Affect Care: no  Factors Affecting Nutritional Intake: NPO, difficulty/impaired swallowing    Anthropometrics  Temp: 97.7 °F (36.5 °C)  Height Method: Stated  Height: 5' 3" (160 cm)  Height (inches): 63 in  Weight Method: Bed Scale  Weight: 82.3 kg (181 lb 7 oz)  Weight (lb): 181.44 lb  Ideal Body Weight (IBW), Female: 115 lb  % Ideal Body Weight, Female (lb): 150.68 lb  BMI (Calculated): 30.8  BMI Grade: 30 - 34.9- obesity - grade I    Lab/Procedures/Meds  Pertinent Labs Reviewed: reviewed  Pertinent Labs Comments: Glu " 115, Alb 2.4  Pertinent Medications Reviewed: reviewed  Pertinent Medications Comments: pantoprazole    Estimated/Assessed Needs  Weight Used For Calorie Calculations: 81.2 kg (179 lb 0.2 oz)  Energy Calorie Requirements (kcal): 2030 kcal/day  Energy Need Method: Kcal/kg(25)  Protein Requirements:  g/day(1.2-1.4 g/kg)  Weight Used For Protein Calculations: 81.2 kg (179 lb 0.2 oz)  Fluid Requirements (mL): 1 mL/kcal or per MD  Estimated Fluid Requirement Method: RDA Method  RDA Method (mL): 2030    Nutrition Prescription Ordered  Current Diet Order: NPO  Current Nutrition Support Formula Ordered: Impact Peptide 1.5  Current Nutrition Support Rate Ordered: 45 (ml)  Current Nutrition Support Frequency Ordered: mL/hr    Evaluation of Received Nutrient/Fluid Intake  Enteral Calories (kcal): 1620  Enteral Protein (gm): 102  Enteral (Free Water) Fluid (mL): 832  % Kcal Needs: 80%  % Protein Needs: 100%  I/O: +3.3L since admit  Energy Calories Required: not meeting needs  Protein Required: meeting needs  Fluid Required: (per MD)  Comments: LBM 6/6  Tolerance: tolerating  % Intake of Estimated Energy Needs: 75 - 100 %  % Meal Intake: NPO    Nutrition Risk  Level of Risk/Frequency of Follow-up: low(1x/week)     Assessment and Plan  Nutrition Problem  Inadequate energy intake     Related to (etiology):   Decreased ability to consume sufficient energy     Signs and Symptoms (as evidenced by):   NPO with no alternative means of nutrition at this time     Interventions/Recommendations (treatment strategy):  Collaboration and referral of nutrition care     Nutrition Diagnosis Status:   Improving    Monitor and Evaluation  Food and Nutrient Intake: energy intake, enteral nutrition intake  Food and Nutrient Adminstration: enteral and parenteral nutrition administration  Anthropometric Measurements: weight, weight change  Biochemical Data, Medical Tests and Procedures: electrolyte and renal panel, gastrointestinal profile,  inflammatory profile  Nutrition-Focused Physical Findings: overall appearance     Nutrition Follow-Up  RD Follow-up?: Yes

## 2019-06-10 NOTE — PLAN OF CARE
Per ENT NP, Catalina Perea, Pt will be discharging home today. GELY spoke with Rl with [facility] ([phone]) and informed her of Pt's discharge. Rl said Pt's feeds were covered at 100%, but asked for some modifications to the orders, which NP agreed to do. GELY placed call to [facility] ([phone]) to inform them of Pt's discharge for today. Pt's tube feeds will need to be delivered to Pt's hospital room prior to discharge.     Pt to discharge home today with home health, home tube feeds and family support.     Katie Murcia LCSW        06/10/19 1052   Post-Acute Status   Post-Acute Authorization Home Health/Hospice   HME Status Pending Delivery Hospital   Home Health/Hospice Status Set-up Complete

## 2019-06-11 VITALS
RESPIRATION RATE: 20 BRPM | WEIGHT: 181.44 LBS | SYSTOLIC BLOOD PRESSURE: 118 MMHG | OXYGEN SATURATION: 95 % | DIASTOLIC BLOOD PRESSURE: 78 MMHG | HEART RATE: 77 BPM | BODY MASS INDEX: 32.15 KG/M2 | HEIGHT: 63 IN | TEMPERATURE: 99 F

## 2019-06-11 PROCEDURE — 97535 SELF CARE MNGMENT TRAINING: CPT

## 2019-06-11 PROCEDURE — 94761 N-INVAS EAR/PLS OXIMETRY MLT: CPT

## 2019-06-11 PROCEDURE — 25000003 PHARM REV CODE 250: Performed by: STUDENT IN AN ORGANIZED HEALTH CARE EDUCATION/TRAINING PROGRAM

## 2019-06-11 PROCEDURE — 92507 TX SP LANG VOICE COMM INDIV: CPT

## 2019-06-11 PROCEDURE — 63600175 PHARM REV CODE 636 W HCPCS: Performed by: OTOLARYNGOLOGY

## 2019-06-11 RX ADMIN — OXYCODONE HYDROCHLORIDE 10 MG: 5 SOLUTION ORAL at 08:06

## 2019-06-11 RX ADMIN — PANTOPRAZOLE SODIUM 40 MG: 40 GRANULE, DELAYED RELEASE ORAL at 08:06

## 2019-06-11 RX ADMIN — OXYCODONE HYDROCHLORIDE 10 MG: 5 SOLUTION ORAL at 12:06

## 2019-06-11 RX ADMIN — OXYCODONE HYDROCHLORIDE 10 MG: 5 SOLUTION ORAL at 04:06

## 2019-06-11 NOTE — PT/OT/SLP PROGRESS
Occupational Therapy      Patient Name:  Jennifer Andre   MRN:  [id number]    Patient not seen today secondary to declining tx 2* awaiting to be discharged. Will follow-up if pt remains in hospital.    ALVIN Campbell  6/11/2019

## 2019-06-11 NOTE — PLAN OF CARE
Problem: SLP Goal  Goal: SLP Goal  Speech Language Pathology Goals  Goals expected to be met by 6/14    1. Pt will tolerate PMSV w/ VSS and O2 sats greater than 95% during all waking hours DC goals  2. Pt and family members will demonstrated independence with PMSV use and care  DC goals     No further ST warranted at this time. Please see note for details.   Betty Ybarra, CANDIS-SLP  6/11/2019

## 2019-06-11 NOTE — NURSING
MD on call for ENT notified of patient's morning lab refusal and holding tube feeding per patient's request.

## 2019-06-11 NOTE — NURSING
Sp to team Pt wants to hold TF at this time d/t abdominal pain and nausea. Team reported that it is fine to hold TF.

## 2019-06-11 NOTE — PLAN OF CARE
Patient discharging home today with [facility] for bolus tube feeds and [facility] ([phone]), RW and BSC.     Future Appointments   Date Time Provider Department Center   6/18/2019 11:30 AM [facility]        06/11/19 1215   Final Note   Assessment Type Final Discharge Note   Anticipated Discharge Disposition Home-Health   Hospital Follow Up  Appt(s) scheduled? Yes   Right Care Referral Info   Post Acute Recommendation Home-care

## 2019-06-11 NOTE — OP NOTE
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  Composite resection of the right mandible, buccal mucosa and floor of mouth  2.  Right modified neck dissection including levels 1 B through 4    ANESTHESIA: General      INDICATIONS FOR PROCEDURE:   Jennifer Andre is a 64 y.o. 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  She has undergone multiple operations including free flap reconstruction at [facility].  She recently presented to Dr. Dangelo for evaluation of a lesion of the right retromolar trigone which was biopsy-proven squamous cell carcinoma.  She was deemed an appropriate candidate for surgery. My services were listed to assist with dissection of the tumor.    She was apprised of the risks, benefits and alternatives to surgery.  In spite of the risk inherent to surgery,she provided informed consent for the aforementioned procedures.     PROCEDURE IN DETAIL:  The patient was taken to the operating room and placed on the operating table in the supine position.  General endotracheal anesthesia was induced by the anesthesia team.  A tracheostomy was placed by Dr. dangelo..  The remainder of the face neck and chest were prepped and draped in standard sterile fashion. While I worked that neck, Dr. Dangelo worked the leg.  Please see his note for details.    Apron incision was marked in the right neck injected with several cc of 1% lidocaine with epinephrine.  The incision was then carried out utilizing a 15 blade.  Sharp dissection proceeded through the underlying subcutaneous tissue and  platysma muscle.  Superiorly and inferiorly-based subplatysmal flaps were then elevated to allow for neck dissection of levels 1B through 4.    Next, the fascia overlying the submandibular gland was incised and elevated off of the overlying gland.  The fascia was then swept superiorly to protect the marginal mandibular nerve.  The fibrofatty tissue overlying the body of mandible was transected down to the underlying periosteum and brought inferiorly.  The attachments to the submandibular gland was then freed sharply.  The facial artery and facial vein were isolated and preserved for microvascular anastomosis.  Numerous intervening branches tributaries to the gland were isolated clipped and divided.  Dissection then proceeded down to identify the posterior belly of the digastric muscle and digastric tendon.  These structures were followed anteriorly over the anterior belly and then posteriorly over the mylohyoid.  The mylohyoid neurovascular pedicle was isolated clipped and divided.  The mylohyoid was retracted anteriorly to reveal submandibular ganglion [place]'s duct.  These were clamped across and divided in the specimen was amputated and sent to pathology as right neck dissection level 1B.    Next the anterior border of sternocleidomastoid muscle was skeletonized from the mastoid into the clavicle.  The spinal accessory nerve was identified in the standard position and followed cephalad.  The overlying fibrofatty tissue was divided up to the internal jugular vein.  The fibrofatty tissue of level 2b was then transected down to the underlying deep cervical fascia swept inferiorly to the spinal accessory nerve.  The fibrofatty tissue of levels 2 and 3 were then transected down to the underlying deep cervical fascia and cervical rootlets.  The omohyoid muscle was bluntly dissected away from the carotid sheath and divided with the Bovie.  The fibrofatty tissue of level 4 was then transected down to the underlying deep  cervical fascia.  The transverse cervical  vessels were identified and preserved.  The specimen was then freed from the lateral border of the internal jugular vein.  Several pedicles were taken clamped and tied off with 2-0 silk ties in order to prevent a Chyle leak.  The phrenic nerve was identified and preserved.  The specimen was elevated off the floor the neck up the carotid sheath.  The sheath was bluntly opened with a tonsil clamp and attachments between the specimen, the vagus nerve and common carotid artery were taken down with the Bovie.  The final attachments with the specimen and the internal jugular vein were then taken down with a 15 blade.  Once the specimen was amputated, he was divided in the levels below and sent to pathology for permanent analysis.    Next attention was turned intraorally.  The tumor was noted to be an ulcerated lesion within the right  retromolar trigone.  A 2 cm mucosal margin was marked circumferentially which would resulting mucosal defect involving the lateral floor of mouth, mandibular gingiva, buccal mucosa and tonsillar fossa.  The mucosal incision was then carried out circumferentially.  Dissection proceeded anteriorly to identify the mandible.  Subperiosteal dissection proceeded from anterior to posterior revealing obvious violation of the bone by the tumor.  Next, we elected to perform mandibulectomy in order to clear deep margin.  The intraoral incision was connected with the neck incision.  The mandible was exposed anteriorly just anterior to the mental foramen and divided with the sagittal saw.  In order to achieve an adequate posterior margin, it became clear that we would have to resect the mandible up the condyle.  The masseter muscle was then freed from the mandible as was the temporalis.  The soft tissue attachments between the condyle and temporomandibular joint were then taken down with the Bovie.  The floor of mouth musculature was then transected and brought  posteriorly to the tonsillar fossa.  Dissection then proceeded down identify the medial pterygoid muscle which was freed from the mandible.  The inferior alveolar nerve was isolated sampled and sent for frozen section.  It returned negative for malignancy.  The final attachments between the pharynx/infratemporal fossa and the mandible were taken down and the specimen was sent to pathology for permanent analysis.  Prior to being sent, it was oriented as below and frozen sections were taken of the mucosal margins.  These all proved negative for carcinoma.  With negative margins assured, the patient was handed over to Dr. oralia diego.  Please see his note for details.    There were no intraoperative complications.  I was present for and participated in the entire procedure as dictated above.       ESTIMATED BLOOD LOSS: 150 mL    SPECIMENS:   Specimen (12h ago, onward)   1. Right neck dissection level1 B- permanent 2. Right neck disscection level 2- permanent 3. Right neck disscection level 3-permanent 4. Right neck dissection level 4- permanent 5. Right inferior alveolar nerve- frozen 6. Composite rection right mandble with buccal mucosa and floor of mouth . singe clip superior,Double clip anterior- permanent 7. Medial mucosal margian-frozen 8. Posterior mucosal margin-frozen 9. Superior mucosal margin-frozen 10. Anterior muscosal margin-frozen

## 2019-06-11 NOTE — NURSING
Discharge instructions discussed with pt and spouse Verbalizes understanding. Paperwork given to pt. Meds delivered per bedside from [facility] prescription delivery. Medications reconciled. Pt informed that showering is okay, but to not submerge incision. Instructed against strenuous activity.  came to bedside to educated Pt and spouse on dressing changes. Vital signs stable. No acute distress noted. Awaiting transport.

## 2019-06-11 NOTE — DISCHARGE SUMMARY
Otolaryngology - Head and Neck Surgery  Discharge Summary    Admission Date: 5/30/2019  Discharge Date: 06/11/2019    Admission Diagnosis:   1. Malignant neoplasm of oral cavity    2. Preoperative testing    3. Squamous cell carcinoma    4. Moderate malnutrition    5. Tracheostomy in place    6. Gastroesophageal reflux disease, esophagitis presence not specified    7. Decreased mobility        Discharge Diagnosis:   1. Malignant neoplasm of oral cavity    2. Preoperative testing    3. Squamous cell carcinoma    4. Moderate malnutrition    5. Tracheostomy in place    6. Gastroesophageal reflux disease, esophagitis presence not specified    7. Decreased mobility        History of Present Illness:  This is a 64-year-old woman with history of multiple oral cancers, who presents with a metachronous primary right retromolar trigone cancer requiring hemimandibulectomy.    Procedures:  1. composite resection of right oral cavity squamous cell carcinoma with segmental mandibulectomy  2. Right fibula free flap with inset into the mouth.  3.  Mandibular reconstruction with transosteal plate.  4.  Right condylar reconstruction.  5.  Site preparation for flap inset.  6.  Tracheostomy.  7.  Neck exploration for vessels with anastomosis into the right facial artery and right common facial vein.  8.  Split-thickness skin graft from the leg measuring 15 x 6 cm.  9.  AlloDerm with inset into the right glenoid fossa.  10. PEG tube placement       Hospital Course: Jennifer Andre underwent the aforementioned procedures with Dr. Dave and Dr. Santiago on 5/30. Following surgery, she was transferred to the SICU. Her trach was capped on 6/9 and she was decannulated on 6/10. General surgery was consulted for PEG tube site pain, which improved when the bumper was loosened. Her hospital stay was uneventful overall and she was discharged home in stable condition on 6/11.    Consults: SICU, general surgery, PT, OT, SLP, social work,  nutrition    Medications:       Medication List      START taking these medications    ondansetron 8 MG tablet  Commonly known as:  ZOFRAN  1 tablet (8 mg total) by Per G Tube route every 8 (eight) hours as needed.     oxyCODONE 5 mg/5 mL Soln  Commonly known as:  ROXICODONE  7.5 mLs (7.5 mg total) by Per G Tube route every 4 (four) hours as needed (for pain).     ranitidine 15 mg/mL syrup  Commonly known as:  ZANTAC  Take 10 mLs (150 mg total) by mouth every 12 (twelve) hours.        CONTINUE taking these medications    clobetasol 0.05 % cream  Commonly known as:  TEMOVATE        STOP taking these medications    pantoprazole 40 MG tablet  Commonly known as:  PROTONIX           Where to Get Your Medications      These [facility]  [address] [place], [place] [address] [address]    Hours:  Mon-Fri 7a-7p, Sat 10a-4p, Sun 10a-4p Phone:  [phone]   · ondansetron 8 MG tablet  · oxyCODONE 5 mg/5 mL Soln  · ranitidine 15 mg/mL syrup         Disposition: home    Instructions:   Diet general    Lifting restrictions    Order Comments:  No heavy lifting (nothing greater than 20 lbs), no stooping   Call MD for: temperature >100.4    Call MD for: redness, tenderness, or signs of infection (pain, swelling, redness, odor or green/yellow discharge around incision site)    Change dressing (specify)    Order Comments:       Follow up in 1 week  Call our office at [phone] for any problems or questions

## 2019-06-11 NOTE — DISCHARGE INSTRUCTIONS
Wound care: OK to shower. Wash incisions gently with soap and water. Pat dry.  Keep occlusive dressing in place over trach stoma, change daily.

## 2019-06-11 NOTE — PLAN OF CARE
Problem: Adult Inpatient Plan of Care  Goal: Plan of Care Review  Outcome: Ongoing (interventions implemented as appropriate)  VS stable. Telemetry= NSR 60-70s, SpO2= 98-97% on 2L NC. Patient remains NPO. Neck dressing dry, intact, no drainage noted. C/o severe PEG tube placement site, patient states pain wakes her up sometimes and it worsens when coughing and getting out of bed. MD on call notified, new dose of Oxycodone ordered. Patient refuses to be stuck for IV access, explained the reason for IV access. Right leg kerlex intact, RLE warm to touch, weak pedal pulses. Flap checks Q4 with strong pulses, soft to touch. Patient not tolerating tube feedings, requested feedings to be held at around 2200 last night, then it was resumed at midnight. At approx 0230, family member at the bedside stopped the feedings per patient's request. Patient refusing TF to be started, patient c/o fullness, no residuals noted. Patient also refusing AM labs. MD on call for ENT was paged, awaiting call back. HAIM

## 2019-06-11 NOTE — ASSESSMENT & PLAN NOTE
POD 12 - Composite resection right oral cavity and right mandible, right modified radical neck dissection, right fibular free flap reconstruction, split thickness skin graft from right thigh, tracheostomy, PEG placement.     Stable for discharge today; general surgery will evaluate PEG prior to discharge  Keep occlusive dressing in place over stoma, change daily.  Home with bolus TF per nutrition recommendations  Home health PT/OT per recommendations

## 2019-06-11 NOTE — SUBJECTIVE & OBJECTIVE
Interval History: PEG pain overnight. Refusing labs and feeds at this time. Otherwise, no issues.    Medications:  Continuous Infusions:  Scheduled Meds:   enoxaparin  40 mg Subcutaneous Daily    lidocaine HCL 20 mg/ml (2%)  2 mL Intradermal Once    pantoprazole  40 mg Per G Tube Daily     PRN Meds:acetaminophen, albuterol-ipratropium, HYDROmorphone, ibuprofen, ondansetron, ondansetron, oxyCODONE, promethazine (PHENERGAN) IVPB     Review of patient's allergies indicates:   Allergen Reactions    Bactrim [sulfamethoxazole-trimethoprim]     Clindamycin     Keflex [cephalexin]      Tolerated Unasyn 05/31/2019    Lortab [hydrocodone-acetaminophen]     Tramadol      DIZZY AND NAUSEA, & VOMITTING     Objective:     Vital Signs (24h Range):  Temp:  [97.5 °F (36.4 °C)-98.3 °F (36.8 °C)] 98.3 °F (36.8 °C)  Pulse:  [63-98] 69  Resp:  [12-20] 18  SpO2:  [90 %-100 %] 90 %  BP: (112-143)/(52-74) 119/74       Lines/Drains/Airways     Drain                 Gastrostomy/Enterostomy 05/30/19 1140 Percutaneous endoscopic gastrostomy (PEG) LUQ feeding 11 days    Female External Urinary Catheter 06/04/19 1730 6 days          Airway                 Surgical Airway 06/06/19 0800 Shiley Uncuffed 4 days                Physical Exam  Awake, appropriate affect, follows commands  Normocephalic  Oral cavity with fibula paddle in place to R buccal mucosa/retromolar trigone.  Strong arterial and venous signal, color approximates leg skin.    Tongue mobile  Neck incision with staple line intact  Neck RENETTA drain removed  occlusive dressing in place  PEG in place  R leg skin graft appears to be healing well.      Significant Labs:  CBC:   Recent Labs   Lab 06/08/19  0325   WBC 7.62   RBC 3.03*   HGB 9.0*   HCT 28.9*      MCV 95   MCH 29.7   MCHC 31.1*     CMP:   Recent Labs   Lab 06/08/19  0325   *   CALCIUM 8.8   ALBUMIN 2.4*   PROT 6.2      K 3.9   CO2 23      BUN 22   CREATININE 0.7   ALKPHOS 67   ALT 34   AST 18    BILITOT 0.3       Significant Diagnostics:  None

## 2019-06-11 NOTE — PT/OT/SLP PROGRESS
Speech Language Pathology Treatment  Discharge    Patient Name:  Jennifer Andre   MRN:  [id number]  Admitting Diagnosis: Squamous cell carcinoma    Recommendations:                 General Recommendations:  Follow-up not indicated  Diet recommendations:  NPO, Liquid Diet Level: NPO until cleared by ENT  General Precautions: Standard, fall  Communication strategies:  none    Subjective     Awake/alert  Pain/Comfort:  · Pain Rating 1: 0/10  · Pain Rating Post-Intervention 1: 0/10    Objective:     Has the patient been evaluated by SLP for swallowing?   No  Keep patient NPO? Yes   Current Respiratory Status: room air      Pt upright in bed upon entry. She was decannulated yesterday and remains on room air this date. Pt with 100% intelligibility during spontaneous speech. SLP provided education on need for NPO diet until cleared by ENT and speech strategies to increase ineligibility with post-op oral edema. Pt and family verbalized understanding of information. No further ST recommended at this time.     Assessment:     Jennifer Andre is a 64 y.o. female     Goals:   Multidisciplinary Problems     SLP Goals        Problem: SLP Goal    Goal Priority Disciplines Outcome   SLP Goal     SLP    Description:  Speech Language Pathology Goals  Goals expected to be met by 6/14    1. Pt will tolerate PMSV w/ VSS and O2 sats greater than 95% during all waking hours DC goals  2. Pt and family members will demonstrated independence with PMSV use and care  DC goals                       Plan:     · Plan of Care reviewed with:  patient, daughter, spouse   · SLP Follow-Up:  No       Discharge recommendations:  outpatient speech therapy       Time Tracking:     SLP Treatment Date:   06/11/19  Speech Start Time:  0831  Speech Stop Time:  0839     Speech Total Time (min):  8 min    Billable Minutes: Seld Care/Home Management Training 8    Betty Ybarra CCC-SLP  06/11/2019

## 2019-06-11 NOTE — PROGRESS NOTES
[facility]  Otorhinolaryngology-Head & Neck Surgery  Progress Note    Subjective:     Post-Op Info:  Procedure(s) (LRB):  MANDIBULECTOMY (Right)  DISSECTION, NECK (Right)  CREATION, FREE FLAP (Right)  EGD, WITH PEG TUBE INSERTION  TRACHEOTOMY (N/A)   12 Days Post-Op  Hospital Day: 13     Interval History: PEG pain overnight. Refusing labs and feeds at this time. Otherwise, no issues.    Medications:  Continuous Infusions:  Scheduled Meds:   enoxaparin  40 mg Subcutaneous Daily    lidocaine HCL 20 mg/ml (2%)  2 mL Intradermal Once    pantoprazole  40 mg Per G Tube Daily     PRN Meds:acetaminophen, albuterol-ipratropium, HYDROmorphone, ibuprofen, ondansetron, ondansetron, oxyCODONE, promethazine (PHENERGAN) IVPB     Review of patient's allergies indicates:   Allergen Reactions    Bactrim [sulfamethoxazole-trimethoprim]     Clindamycin     Keflex [cephalexin]      Tolerated Unasyn 05/31/2019    Lortab [hydrocodone-acetaminophen]     Tramadol      DIZZY AND NAUSEA, & VOMITTING     Objective:     Vital Signs (24h Range):  Temp:  [97.5 °F (36.4 °C)-98.3 °F (36.8 °C)] 98.3 °F (36.8 °C)  Pulse:  [63-98] 69  Resp:  [12-20] 18  SpO2:  [90 %-100 %] 90 %  BP: (112-143)/(52-74) 119/74       Lines/Drains/Airways     Drain                 Gastrostomy/Enterostomy 05/30/19 1140 Percutaneous endoscopic gastrostomy (PEG) LUQ feeding 11 days    Female External Urinary Catheter 06/04/19 1730 6 days          Airway                 Surgical Airway 06/06/19 0800 Shiley Uncuffed 4 days                Physical Exam  Awake, appropriate affect, follows commands  Normocephalic  Oral cavity with fibula paddle in place to R buccal mucosa/retromolar trigone.  Strong arterial and venous signal, color approximates leg skin.    Tongue mobile  Neck incision with staple line intact  Neck RENETTA drain removed  occlusive dressing in place  PEG in place  R leg skin graft appears to be healing well.      Significant Labs:  CBC:    Recent Labs   Lab 06/08/19  0325   WBC 7.62   RBC 3.03*   HGB 9.0*   HCT 28.9*      MCV 95   MCH 29.7   MCHC 31.1*     CMP:   Recent Labs   Lab 06/08/19  0325   *   CALCIUM 8.8   ALBUMIN 2.4*   PROT 6.2      K 3.9   CO2 23      BUN 22   CREATININE 0.7   ALKPHOS 67   ALT 34   AST 18   BILITOT 0.3       Significant Diagnostics:  None    Assessment/Plan:     Malignant neoplasm of oral cavity  POD 12 - Composite resection right oral cavity and right mandible, right modified radical neck dissection, right fibular free flap reconstruction, split thickness skin graft from right thigh, tracheostomy, PEG placement.     Stable for discharge today; general surgery will evaluate PEG prior to discharge  Keep occlusive dressing in place over stoma, change daily.  Home with bolus TF per nutrition recommendations  Home health PT/OT per recommendations            Danie Bowie MD  Otorhinolaryngology-Head & Neck Surgery  [facility]

## 2019-06-11 NOTE — CONSULTS
[facility]  General Surgery  Consult Note    Inpatient consult to General Surgery  Consult performed by: Wero Patrick MD  Consult ordered by: Isabella Perea NP        -Surgery consulted for pain after PEG tube placement. She had a PEG tube placed 5/30/19. Per family she states that she's had worsening pain at the PEG tube insertion since it's been placed. On exam she is exquisitely tender at the tube insertion site. There is no surrounding erythema or edema. The tightening bumper was loosened around the skin. This should help relieve some of her pain. If she continues to have pain, recommend getting CT abd to ensure she does not have a buried bumper. Discussed with staff.    Wero Patrick MD  General Surgery  PGY-5  [phone] (pager)

## 2019-06-12 ENCOUNTER — TELEPHONE (OUTPATIENT)
Dept: OTOLARYNGOLOGY | Facility: CLINIC | Age: 64
End: 2019-06-12

## 2019-06-12 NOTE — TELEPHONE ENCOUNTER
----- Message from Jewell Waite sent at 6/12/2019  3:06 PM CDT -----  Contact: Dr. Chintan Johnson   Needs Advice    Reason for call:Dr. Johnson needs all appt notes and records for pt from Dr. Hill.        Communication Preference: phone: [phone]    Additional Information:fax: [phone]

## 2019-06-14 ENCOUNTER — TELEPHONE (OUTPATIENT)
Dept: OTOLARYNGOLOGY | Facility: CLINIC | Age: 64
End: 2019-06-14

## 2019-06-14 NOTE — PT/OT/SLP DISCHARGE
Occupational Therapy Discharge Summary    Jennifer Andre  MRN: [id number]   Principal Problem: Squamous cell carcinoma      Patient Discharged from acute Occupational Therapy on 6/12/19.  s.    Assessment:      Patient was discharged unexpectedly.  Information required to complete an accurate discharge summary is unknown.  Refer to therapy initial evaluation and last progress note for initial and most recent functional status and goal achievement.  Recommendations made may be found in medical record.    Objective:     GOALS:   Multidisciplinary Problems     Occupational Therapy Goals        Problem: Occupational Therapy Goal    Goal Priority Disciplines Outcome Interventions   Occupational Therapy Goal     OT, PT/OT Ongoing (interventions implemented as appropriate)    Description:  Goals to be met by: 6/10/19    Patient will increase functional independence with ADLs by performing:    UE Dressing with Stand-by Assistance.  LE Dressing with Moderate Assistance.  Grooming while standing at sink with Minimal Assistance.  Toileting from bedside commode with Moderate Assistance for hygiene and clothing management.   Supine to sit with Minimal Assistance.  Stand pivot transfers with Moderate Assistance in prep for performance of functional activity.  Toilet transfer to bedside commodper handout, with independence.                      Reasons for Discontinuation of Therapy Services  Transfer to alternate level of care.      Plan:     Patient Discharged to: Home with Home Health Service    ALVIN Campbell  6/14/2019

## 2019-06-14 NOTE — TELEPHONE ENCOUNTER
"Returned call to daughter. States patient is not tolerating tube feeds, feels nauseous at continuous rate of 45cc/hr, so rate decreased to 35cc/hr, however, patient "feels hungry and weak".  When asked, informed that patient has not had a BM since surgery. Instructed to take OTC Miralax, up to twice daily as directed and/or OTC Senna. If still no bowel movement produced and/or if nausea/discomfort increases, instructed to notify ENT resident on call and visit nearest ED for evaluation. Verbalized understanding.   "

## 2019-06-14 NOTE — PROGRESS NOTES
Physical Therapy Discharge Summary    Name: Jennifer Andre  MRN: [id number]   Principal Problem: Squamous cell carcinoma     Patient Discharged from acute Physical Therapy on 19.  Please refer to prior PT noted date on 6/10/19 for functional status.     Assessment:     Patient appropriate for care in another setting.    Objective:     GOALS:   Multidisciplinary Problems     Physical Therapy Goals        Problem: Physical Therapy Goal    Goal Priority Disciplines Outcome Goal Variances Interventions   Physical Therapy Goal     PT, PT/OT Ongoing (interventions implemented as appropriate)     Description:  Goals to be met by: 19     Patient will increase functional independence with mobility by performin. Supine to sit with Set-up [place]- not met  2. Sit to supine with Set-up [place] -not met  3. Sit to stand transfer with Minimal Assistance- met   4. Bed to chair transfer with Minimal Assistance using appropriate AD -met   5. Gait  x 50 feet with Minimal Assistance using appropriate AD. - met   6. Lower extremity exercise program x20-30 reps per handout, with independence to improve functional strength and endurance  7. Pt supervision sit to/from stand. - not met  8. Pt receive gait training ~ 250 ft with RW and supervision- not met                     Reasons for Discontinuation of Therapy Services  Transfer to alternate level of care.      Plan:     Patient Discharged to: Home with Home Health Service.    Monet Gregg, PT  2019

## 2019-06-14 NOTE — TELEPHONE ENCOUNTER
----- Message from Marilu Jose sent at 6/14/2019  3:43 PM CDT -----  Contact: daughter-lyric  Please call above pt daughter  at [phone] need to speak with the nurse about food intake waiting on a call back thanks

## 2019-06-18 ENCOUNTER — OFFICE VISIT (OUTPATIENT)
Dept: OTOLARYNGOLOGY | Facility: CLINIC | Age: 64
End: 2019-06-18
Payer: MEDICARE

## 2019-06-18 VITALS — HEART RATE: 78 BPM | SYSTOLIC BLOOD PRESSURE: 112 MMHG | DIASTOLIC BLOOD PRESSURE: 70 MMHG

## 2019-06-18 DIAGNOSIS — C06.9 MALIGNANT NEOPLASM OF ORAL CAVITY: Primary | ICD-10-CM

## 2019-06-18 PROCEDURE — 99999 PR PBB SHADOW E&M-EST. PATIENT-LVL III: CPT | Mod: PBBFAC,,, | Performed by: NURSE PRACTITIONER

## 2019-06-18 PROCEDURE — 99024 POSTOP FOLLOW-UP VISIT: CPT | Mod: S$GLB,,, | Performed by: NURSE PRACTITIONER

## 2019-06-18 PROCEDURE — 99024 PR POST-OP FOLLOW-UP VISIT: ICD-10-PCS | Mod: S$GLB,,, | Performed by: NURSE PRACTITIONER

## 2019-06-18 PROCEDURE — 99999 PR PBB SHADOW E&M-EST. PATIENT-LVL III: ICD-10-PCS | Mod: PBBFAC,,, | Performed by: NURSE PRACTITIONER

## 2019-06-18 RX ORDER — ONDANSETRON HYDROCHLORIDE 8 MG/1
8 TABLET, FILM COATED ORAL EVERY 8 HOURS PRN
Qty: 20 TABLET | Refills: 1 | Status: SHIPPED | OUTPATIENT
Start: 2019-06-18 | End: 2022-09-22 | Stop reason: CLARIF

## 2019-06-18 NOTE — PROGRESS NOTES
Subjective:       Patient ID: Jennifer Andre is a 64 y.o. female.    Chief Complaint: Post-op Evaluation       Malignant neoplasm of oral cavity    2013 Surgery     L Segmental mandibulectomy, chemo/RT - [facility] - 2013 2013 Initial Diagnosis     Malignant neoplasm of oral cavity         2015 Surgery      L mandible ORN, osteocutaneous RFFF - [facility] - ~2015 4/2018 Biopsy     Biopsy R RMT - [place] - 04/2018 5/30/2019 Surgery     1.  Composite resection of the right mandible, buccal mucosa and floor of mouth  2.  Right modified neck dissection including levels 1 B through 4  3.  Right fibula free flap with inset into the mouth.  4.  Mandibular reconstruction with transosteal plate.  5.  Right condylar reconstruction.  6.  Site preparation for flap inset.  7.  Tracheostomy.  8.  Neck exploration for vessels with anastomosis into the right facial artery and right common facial vein.  9.  Split-thickness skin graft from the leg measuring 15 x 6 cm.  10.  AlloDerm with inset into the right glenoid fossa.           Squamous cell carcinoma    5/30/2019 Initial Diagnosis     Squamous cell carcinoma            HPI     Jennifer Andre returns for a post op visit. She reports nausea and vomiting with tube feeds. She has decreased the rate but states that this has not helped. She has some pain at the PEG tube site, but no generalized abdominal pain or distention. She is having regular BMs. She denies fever or chills. She is under the care of home health. No other complaints.    Past Medical History:   Diagnosis Date    Cancer        Past Surgical History:   Procedure Laterality Date    APPENDECTOMY      CHOLECYSTECTOMY      CREATION, FREE FLAP Right 5/30/2019    Performed by [facility] at [facility] OR 2ND FLR    DISSECTION, NECK Right 5/30/2019    Performed by Wallace Santiago MD at [facility] OR 2ND FLR    EGD, WITH PEG TUBE INSERTION  5/30/2019    Performed by Ottoniel [facility] at [facility] OR [facility]  FLR    HYSTERECTOMY      MANDIBLE SURGERY      MANDIBULECTOMY Right 5/30/2019    Performed by [facility] at [facility] OR 2ND FLR    TRACHEOTOMY N/A 5/30/2019    Performed by [facility]deep [facility]o MD at [facility] OR 2ND FLR         Current Outpatient Medications:     clobetasol (TEMOVATE) 0.05 % cream, Apply topically 2 (two) times daily as needed., Disp: , Rfl:     ondansetron (ZOFRAN) 8 MG tablet, 1 tablet (8 mg total) by Per G Tube route every 8 (eight) hours as needed., Disp: 20 tablet, Rfl: 1    oxyCODONE (ROXICODONE) 5 mg/5 mL Soln, 7.5 mLs (7.5 mg total) by Per G Tube route every 4 (four) hours as needed (for pain)., Disp: 473 mL, Rfl: 0    ranitidine (ZANTAC) 15 mg/mL syrup, Take 10 mLs (150 mg total) by mouth every 12 (twelve) hours., Disp: 473 mL, Rfl: 2    Review of patient's allergies indicates:   Allergen Reactions    Bactrim [sulfamethoxazole-trimethoprim]     Clindamycin     Keflex [cephalexin]      Tolerated Unasyn 05/31/2019    Lortab [hydrocodone-acetaminophen]     Tramadol      DIZZY AND NAUSEA, & VOMITTING       Social History     Socioeconomic History    Marital status:      Spouse name: Not on file    Number of children: Not on file    Years of education: Not on file    Highest education level: Not on file   Occupational History    Not on file   Social Needs    Financial resource strain: Not on file    Food insecurity:     Worry: Not on file     Inability: Not on file    Transportation needs:     Medical: Not on file     Non-medical: Not on file   Tobacco Use    Smoking status: Former Smoker    Smokeless tobacco: Never Used   Substance and Sexual Activity    Alcohol use: Not on file    Drug use: Not on file    Sexual activity: Not on file   Lifestyle    Physical activity:     Days per week: Not on file     Minutes per session: Not on file    Stress: Not on file   Relationships    Social connections:     Talks on phone: Not on file     Gets together: Not on file      Attends Sabianist service: Not on file     Active member of club or organization: Not on file     Attends meetings of clubs or organizations: Not on file     Relationship status: Not on file   Other Topics Concern    Not on file   Social History Narrative    Not on file       History reviewed. No pertinent family history.      Review of Systems   Constitutional: Negative for appetite change, chills, diaphoresis, fatigue, fever and unexpected weight change.   HENT: Positive for trouble swallowing. Negative for congestion, dental problem, drooling, ear discharge, ear pain, facial swelling, hearing loss, mouth sores, nosebleeds, postnasal drip, rhinorrhea, sinus pressure, sneezing, sore throat, tinnitus and voice change.    Eyes: Negative for pain, discharge, redness and itching.   Respiratory: Negative for cough and shortness of breath.    Cardiovascular: Negative for chest pain.   Gastrointestinal: Positive for nausea and vomiting. Negative for abdominal distention, abdominal pain and diarrhea.   Endocrine: Negative for cold intolerance and heat intolerance.   Genitourinary: Negative for difficulty urinating.   Musculoskeletal: Negative for neck pain and neck stiffness.   Skin: Positive for wound. Negative for rash.   Neurological: Negative for dizziness, weakness and headaches.   Hematological: Negative for adenopathy.       Objective:      Physical Exam   Constitutional: She is oriented to person, place, and time. She appears well-developed and well-nourished. She appears ill. No distress.   Vomiting in emesis bag   HENT:   Head: Normocephalic and atraumatic.   Skin paddle healthy with good color and turgor.   Neck:   Incision CDI.  No redness, drainage, or fluid collection noted.  Edges well approximated.  Staples removed without difficulty.  Trach stoma nearly completely closed.   Cardiovascular: Normal rate.   Pulmonary/Chest: Effort normal. No respiratory distress.   Abdominal: Soft. Normal appearance.  "There is no tenderness.   PEG tube in place.   Musculoskeletal:        Legs:  Neurological: She is alert and oriented to person, place, and time.   Skin: Skin is warm and dry. She is not diaphoretic.   Vitals reviewed.          Final Path, 5/30/19  Procedure: Partial resection of floor of mouth, partial mandibulectomy  Tumor Site: Floor of mouth  Tumor laterality: Right  Tumor focality: Unifocal  Tumor size  -Greatest dimension: 1.3 cm  -Tumor depth of invasion (DOI) (mm): 3.5 mm  Histologic type: Squamous cell carcinoma, conventional  Histologic grade: G1, well differentiated  Specimen margins: Uninvolved by invasive tumor  -Distance from closest margin (mm): 7 mm  -Specify location of closest margin, per orientation, if possible: Medial soft tissue  Tumor bed margin orientation: Oriented to true margins surface  Tumor bed margins: Uninvolved by invasive carcinoma  Lymphovascular invasion: Not identified  Perineural invasion: Not identified  Worst Pattern of Invasion (WPOI): WPOI 1-4  Regional lymph nodes  -Number of lymph nodes involved: 0  -Number of lymph nodes examined: 46  Pathologic Stage Classification (pTNM)  -Primary Tumor (pT): pT1  -Regional Lymph Nodes (pN): pN0    Assessment:       1. Malignant neoplasm of oral cavity        Plan:       Problem List Items Addressed This Visit        Oncology    Malignant neoplasm of oral cavity - Primary     Overall, she is healing well. She will keep Xeroform to the skin graft site and change twice daily. Dr. Dangelo and I encouraged patient to go to ER today for vomiting, however patient and  refuse stating it is "just the milk." Since she is 3 weeks post op, she may begin a liquid diet and stop tube feeds. If she continues to have nausea and vomiting, she should go to there ER.  We also discussed seeing general surgery for PEG tube site pain, but she would rather see someone in [place]. Questions answered. RTC in 2 weeks, sooner if needed.                  "

## 2019-06-19 NOTE — ASSESSMENT & PLAN NOTE
"Overall, she is healing well. She will keep Xeroform to the skin graft site and change twice daily. Dr. Dangelo and I encouraged patient to go to ER today for vomiting, however patient and  refuse stating it is "just the milk." Since she is 3 weeks post op, she may begin a liquid diet and stop tube feeds. If she continues to have nausea and vomiting, she should go to there ER.  We also discussed seeing general surgery for PEG tube site pain, but she would rather see someone in [place]. Questions answered. RTC in 2 weeks, sooner if needed.  "

## 2019-07-09 ENCOUNTER — OFFICE VISIT (OUTPATIENT)
Dept: OTOLARYNGOLOGY | Facility: CLINIC | Age: 64
End: 2019-07-09
Payer: MEDICARE

## 2019-07-09 VITALS
WEIGHT: 142.88 LBS | TEMPERATURE: 98 F | DIASTOLIC BLOOD PRESSURE: 77 MMHG | SYSTOLIC BLOOD PRESSURE: 117 MMHG | BODY MASS INDEX: 25.31 KG/M2 | HEART RATE: 74 BPM

## 2019-07-09 DIAGNOSIS — C06.9 MALIGNANT NEOPLASM OF ORAL CAVITY: Primary | ICD-10-CM

## 2019-07-09 PROCEDURE — 99024 POSTOP FOLLOW-UP VISIT: CPT | Mod: S$GLB,,, | Performed by: NURSE PRACTITIONER

## 2019-07-09 PROCEDURE — 99999 PR PBB SHADOW E&M-EST. PATIENT-LVL III: CPT | Mod: PBBFAC,,, | Performed by: NURSE PRACTITIONER

## 2019-07-09 PROCEDURE — 99024 PR POST-OP FOLLOW-UP VISIT: ICD-10-PCS | Mod: S$GLB,,, | Performed by: NURSE PRACTITIONER

## 2019-07-09 PROCEDURE — 99999 PR PBB SHADOW E&M-EST. PATIENT-LVL III: ICD-10-PCS | Mod: PBBFAC,,, | Performed by: NURSE PRACTITIONER

## 2019-07-09 NOTE — ASSESSMENT & PLAN NOTE
Jennifer Andre is healing well. OK for PEG tube to be removed. Questions answered. RTC in 2 months for surveillance, sooner if needed.

## 2019-07-09 NOTE — PROGRESS NOTES
Subjective:       Patient ID: Jennifer Andre is a 64 y.o. female.    Chief Complaint: Post-op Evaluation/ feeding tube is hurting       Malignant neoplasm of oral cavity    2013 Surgery     L Segmental mandibulectomy, chemo/RT - [facility] - 2013 2013 Initial Diagnosis     Malignant neoplasm of oral cavity         2015 Surgery      L mandible ORN, osteocutaneous RFFF - [facility] - ~2015 4/2018 Biopsy     Biopsy R RMT - [place] - 04/2018 5/30/2019 Surgery     1.  Composite resection of the right mandible, buccal mucosa and floor of mouth  2.  Right modified neck dissection including levels 1 B through 4  3.  Right fibula free flap with inset into the mouth.  4.  Mandibular reconstruction with transosteal plate.  5.  Right condylar reconstruction.  6.  Site preparation for flap inset.  7.  Tracheostomy.  8.  Neck exploration for vessels with anastomosis into the right facial artery and right common facial vein.  9.  Split-thickness skin graft from the leg measuring 15 x 6 cm.  10.  AlloDerm with inset into the right glenoid fossa.           Squamous cell carcinoma    5/30/2019 Initial Diagnosis     Squamous cell carcinoma            HPI     Jennifer Andre returns for a post op visit. She has been doing well since her last visit. She has no pain. She is tolerating a soft diet and maintaining her weight. She has not used her PEG since her last visit. She is seeing wound care for her fibular free flap donor site and feels that the wound is healing well.  No other complaints.    Past Medical History:   Diagnosis Date    Cancer        Past Surgical History:   Procedure Laterality Date    APPENDECTOMY      CHOLECYSTECTOMY      CREATION, FREE FLAP Right 5/30/2019    [facility]formed [facility] at [facility] 2ND [facility]    DISSECTION, NECK Right 5/30/2019    Performed by Wallace [facility] at [facility]    EGD, WITH PEG TUBE INSERTION  5/30/2019    Performed by [facility]iel [facility] at [facility] OR  2ND FLR    HYSTERECTOMY      MANDIBLE SURGERY      MANDIBULECTOMY Right 5/30/2019    [facility] 2ND [facility]    TRACHEOTOMY N/A 5/30/2019    Performed by [facility] at [facility] OR 2ND [facility]LR         Current Outpatient Medications:     clobetasol (TEMOVATE) 0.05 % cream, Apply topically 2 (two) times daily as needed., Disp: , Rfl:     ranitidine (ZANTAC) 15 mg/mL syrup, Take 10 mLs (150 mg total) by mouth every 12 (twelve) hours., Disp: 473 mL, Rfl: 2    ondansetron (ZOFRAN) 8 MG tablet, 1 tablet (8 mg total) by Per G Tube route every 8 (eight) hours as needed., Disp: 20 tablet, Rfl: 1    oxyCODONE (ROXICODONE) 5 mg/5 mL Soln, 7.5 mLs (7.5 mg total) by Per G Tube route every 4 (four) hours as needed (for pain)., Disp: 473 mL, Rfl: 0    Review of patient's allergies indicates:   Allergen Reactions    Bactrim [sulfamethoxazole-trimethoprim]     Clindamycin     Keflex [cephalexin]      Tolerated Unasyn 05/31/2019    Lortab [hydrocodone-acetaminophen]     Tramadol      DIZZY AND NAUSEA, & VOMITTING       Social History     Socioeconomic History    Marital status:      Spouse name: Not on file    Number of children: Not on file    Years of education: Not on file    Highest education level: Not on file   Occupational History    Not on file   Social Needs    Financial resource strain: Not on file    Food insecurity:     Worry: Not on file     Inability: Not on file    Transportation needs:     Medical: Not on file     Non-medical: Not on file   Tobacco Use    Smoking status: Former Smoker    Smokeless tobacco: Never Used   Substance and Sexual Activity    Alcohol use: Not on file    Drug use: Not on file    Sexual activity: Not on file   Lifestyle    Physical activity:     Days per week: Not on file     Minutes per session: Not on file    Stress: Not on file   Relationships    Social connections:     Talks on phone: Not on file     Gets together: Not on  file     Attends [employer] service: Not on file     Active member of club or organization: Not on file     Attends meetings of clubs or organizations: Not on file     Relationship status: Not on file   Other Topics Concern    Not on file   Social History Narrative    Not on file       History reviewed. No pertinent family history.      Review of Systems   Constitutional: Negative for appetite change, chills, diaphoresis, fatigue, fever and unexpected weight change.   HENT: Positive for trouble swallowing. Negative for congestion, dental problem, drooling, ear discharge, ear pain, facial swelling, hearing loss, mouth sores, nosebleeds, postnasal drip, rhinorrhea, sinus pressure, sneezing, sore throat, tinnitus and voice change.    Eyes: Negative for pain, discharge, redness and itching.   Respiratory: Negative for cough and shortness of breath.    Cardiovascular: Negative for chest pain.   Gastrointestinal: Positive for nausea and vomiting. Negative for abdominal distention, abdominal pain and diarrhea.   Endocrine: Negative for cold intolerance and heat intolerance.   Genitourinary: Negative for difficulty urinating.   Musculoskeletal: Negative for neck pain and neck stiffness.   Skin: Positive for wound. Negative for rash.   Neurological: Negative for dizziness, weakness and headaches.   Hematological: Negative for adenopathy.       Objective:      Physical Exam   Constitutional: She is oriented to person, place, and time. She appears well-developed and well-nourished. She appears ill. No distress.   Vomiting in emesis bag   HENT:   Head: Normocephalic and atraumatic.   Skin paddle healthy with good color and turgor.   Neck:   Incision CDI.  No redness, drainage, or fluid collection noted.  Edges well approximated.    Trach stoma completely closed.   Cardiovascular: Normal rate.   Pulmonary/Chest: Effort normal. No respiratory distress.   Abdominal: Soft. Normal appearance. There is no tenderness.   PEG tube in  place.   Musculoskeletal:        Legs:  Neurological: She is alert and oriented to person, place, and time.   Skin: Skin is warm and dry. She is not diaphoretic.   Vitals reviewed.          Final Path, 5/30/19  Procedure: Partial resection of floor of mouth, partial mandibulectomy  Tumor Site: Floor of mouth  Tumor laterality: Right  Tumor focality: Unifocal  Tumor size  -Greatest dimension: 1.3 cm  -Tumor depth of invasion (DOI) (mm): 3.5 mm  Histologic type: Squamous cell carcinoma, conventional  Histologic grade: G1, well differentiated  Specimen margins: Uninvolved by invasive tumor  -Distance from closest margin (mm): 7 mm  -Specify location of closest margin, per orientation, if possible: Medial soft tissue  Tumor bed margin orientation: Oriented to true margins surface  Tumor bed margins: Uninvolved by invasive carcinoma  Lymphovascular invasion: Not identified  Perineural invasion: Not identified  Worst Pattern of Invasion (WPOI): WPOI 1-4  Regional lymph nodes  -Number of lymph nodes involved: 0  -Number of lymph nodes examined: 46  Pathologic Stage Classification (pTNM)  -Primary Tumor (pT): pT1  -Regional Lymph Nodes (pN): pN0    Assessment:       1. Malignant neoplasm of oral cavity        Plan:       Problem List Items Addressed This Visit        Oncology    Malignant neoplasm of oral cavity - Primary     Jennifer Ander is healing well. OK for PEG tube to be removed. Questions answered. RTC in 2 months for surveillance, sooner if needed.

## 2019-07-10 ENCOUNTER — TELEPHONE (OUTPATIENT)
Dept: OTOLARYNGOLOGY | Facility: CLINIC | Age: 64
End: 2019-07-10

## 2019-07-10 NOTE — TELEPHONE ENCOUNTER
"Recent office note, stating "ok" to remove feeding tube faxed to Dr. Johnson's office [phone], per patient request.   "

## 2019-07-10 NOTE — TELEPHONE ENCOUNTER
----- Message from Monet Gan sent at 7/10/2019 11:01 AM CDT -----  Contact: pt  Needs Advice    Reason for call: pt is calling to speak with the nurse to have something faxed over to her doctor at home to have her feeding tube taken out by Dr Johnson         Communication Preference: can you please call pt at [phone]    Additional Information: none    DEBBIE

## 2019-07-24 ENCOUNTER — TELEPHONE (OUTPATIENT)
Dept: OTOLARYNGOLOGY | Facility: CLINIC | Age: 64
End: 2019-07-24

## 2019-07-24 NOTE — TELEPHONE ENCOUNTER
----- Message from Nikki Pinto sent at 7/24/2019 10:25 AM CDT -----  Contact: self  Pt is requesting to speak with Yeimy. Per pt, she needs information in regards to her stomach and the feeding tube. She would like a call back to discuss this matter. Pt would like a call back on today.     She can be reached at [phone].    Thank you

## 2019-08-01 ENCOUNTER — TELEPHONE (OUTPATIENT)
Dept: OTOLARYNGOLOGY | Facility: CLINIC | Age: 64
End: 2019-08-01

## 2019-08-01 NOTE — TELEPHONE ENCOUNTER
----- Message from Jewell Waite sent at 8/1/2019  2:22 PM CDT -----  Contact: pt   Needs Advice    Reason for call:Pt states that some papers was supposed to be sent over to her doctor;s office but its been two weeks and they haven't received it yet.        Communication Preference:Please give pt a call back at [phone].      Additional Information:Pt is calling for Concepción.

## 2019-08-02 ENCOUNTER — TELEPHONE (OUTPATIENT)
Dept: OTOLARYNGOLOGY | Facility: CLINIC | Age: 64
End: 2019-08-02

## 2019-08-02 NOTE — TELEPHONE ENCOUNTER
----- Message from Marilu Jose sent at 8/2/2019  1:02 PM CDT -----  Contact: pt  [phone]-please call above pt need to speak with the nurse call number in message thanks

## 2019-08-06 ENCOUNTER — TELEPHONE (OUTPATIENT)
Dept: SURGERY | Facility: CLINIC | Age: 64
End: 2019-08-06

## 2022-08-08 ENCOUNTER — TELEPHONE (OUTPATIENT)
Dept: HEMATOLOGY/ONCOLOGY | Facility: CLINIC | Age: 67
End: 2022-08-08
Payer: MEDICAID

## 2022-08-08 NOTE — TELEPHONE ENCOUNTER
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 Called pt to schedule, she is requesting an appt on or after September 1st. She had to change insurance to be able to come here & the new coverage will be effective on that day. Scheduled with  on 9-1-22 for 11am. Provided my direct number should she need anything in the interim. CD of recent imaging requested, all other records in chart.

## 2022-08-08 NOTE — NURSING
Oncology Navigation   Intake  Date of Diagnosis: 8/1/2022  Cancer Type: Head and Neck  Internal / External Referral: External  Referral Source:   Date of Referral: 8/5/2022  Initial Nurse Navigator Contact: 8/8/2022  Referral to Initial Contact Timeline (days): 3  Date Worked: 8/8/2022  First Appointment Available: 8/15/2022  Appointment Date: 9/1/2022  First Available Date vs. Scheduled Date (days): 17  Reason if booked > 7 days after scheduling: Other  Other reason booked > 7 days: Insurance     Treatment  Current Status: Staging work-up    Surgical Oncologist: Dr.Christian Santiago  Consult Date: 9/1/2022          Procedures: Biopsy  Biopsy Schedule Date: 8/1/2022                 Acuity      Follow Up  No follow-ups on file.

## 2022-09-01 ENCOUNTER — HOSPITAL ENCOUNTER (OUTPATIENT)
Dept: RADIOLOGY | Facility: HOSPITAL | Age: 67
Discharge: HOME | End: 2022-09-01
Attending: OTOLARYNGOLOGY
Payer: MEDICARE

## 2022-09-01 ENCOUNTER — OFFICE VISIT (OUTPATIENT)
Dept: OTOLARYNGOLOGY | Facility: CLINIC | Age: 67
End: 2022-09-01
Payer: MEDICARE

## 2022-09-01 VITALS
DIASTOLIC BLOOD PRESSURE: 80 MMHG | WEIGHT: 127.44 LBS | SYSTOLIC BLOOD PRESSURE: 126 MMHG | BODY MASS INDEX: 22.57 KG/M2 | HEART RATE: 66 BPM

## 2022-09-01 DIAGNOSIS — C06.9 MALIGNANT NEOPLASM OF ORAL CAVITY: ICD-10-CM

## 2022-09-01 DIAGNOSIS — C06.9 MALIGNANT NEOPLASM OF ORAL CAVITY: Primary | ICD-10-CM

## 2022-09-01 LAB
CREAT SERPL-MCNC: 0.7 MG/DL (ref 0.5–1.4)
SAMPLE: NORMAL

## 2022-09-01 PROCEDURE — 1101F PT FALLS ASSESS-DOCD LE1/YR: CPT | Mod: CPTII,S$GLB,, | Performed by: OTOLARYNGOLOGY

## 2022-09-01 PROCEDURE — 3008F BODY MASS INDEX DOCD: CPT | Mod: CPTII,S$GLB,, | Performed by: OTOLARYNGOLOGY

## 2022-09-01 PROCEDURE — 3074F SYST BP LT 130 MM HG: CPT | Mod: CPTII,S$GLB,, | Performed by: OTOLARYNGOLOGY

## 2022-09-01 PROCEDURE — 70491 CT NECK CHEST WITH CONTRAST (XPD): ICD-10-PCS | Mod: 26,,, | Performed by: RADIOLOGY

## 2022-09-01 PROCEDURE — 99214 PR OFFICE/OUTPT VISIT, EST, LEVL IV, 30-39 MIN: ICD-10-PCS | Mod: S$GLB,,, | Performed by: OTOLARYNGOLOGY

## 2022-09-01 PROCEDURE — 3079F PR MOST RECENT DIASTOLIC BLOOD PRESSURE 80-89 MM HG: ICD-10-PCS | Mod: CPTII,S$GLB,, | Performed by: OTOLARYNGOLOGY

## 2022-09-01 PROCEDURE — 70491 CT SOFT TISSUE NECK W/DYE: CPT | Mod: TC

## 2022-09-01 PROCEDURE — 1159F MED LIST DOCD IN RCRD: CPT | Mod: CPTII,S$GLB,, | Performed by: OTOLARYNGOLOGY

## 2022-09-01 PROCEDURE — 71260 CT THORAX DX C+: CPT | Mod: 26,,, | Performed by: RADIOLOGY

## 2022-09-01 PROCEDURE — 1126F AMNT PAIN NOTED NONE PRSNT: CPT | Mod: CPTII,S$GLB,, | Performed by: OTOLARYNGOLOGY

## 2022-09-01 PROCEDURE — 1159F PR MEDICATION LIST DOCUMENTED IN MEDICAL RECORD: ICD-10-PCS | Mod: CPTII,S$GLB,, | Performed by: OTOLARYNGOLOGY

## 2022-09-01 PROCEDURE — 3008F PR BODY MASS INDEX (BMI) DOCUMENTED: ICD-10-PCS | Mod: CPTII,S$GLB,, | Performed by: OTOLARYNGOLOGY

## 2022-09-01 PROCEDURE — 70491 CT SOFT TISSUE NECK W/DYE: CPT | Mod: 26,,, | Performed by: RADIOLOGY

## 2022-09-01 PROCEDURE — 1160F PR REVIEW ALL MEDS BY PRESCRIBER/CLIN PHARMACIST DOCUMENTED: ICD-10-PCS | Mod: CPTII,S$GLB,, | Performed by: OTOLARYNGOLOGY

## 2022-09-01 PROCEDURE — 3079F DIAST BP 80-89 MM HG: CPT | Mod: CPTII,S$GLB,, | Performed by: OTOLARYNGOLOGY

## 2022-09-01 PROCEDURE — 1160F RVW MEDS BY RX/DR IN RCRD: CPT | Mod: CPTII,S$GLB,, | Performed by: OTOLARYNGOLOGY

## 2022-09-01 PROCEDURE — 1126F PR PAIN SEVERITY QUANTIFIED, NO PAIN PRESENT: ICD-10-PCS | Mod: CPTII,S$GLB,, | Performed by: OTOLARYNGOLOGY

## 2022-09-01 PROCEDURE — 99999 PR PBB SHADOW E&M-EST. PATIENT-LVL IV: ICD-10-PCS | Mod: PBBFAC,,, | Performed by: OTOLARYNGOLOGY

## 2022-09-01 PROCEDURE — 99214 OFFICE O/P EST MOD 30 MIN: CPT | Mod: S$GLB,,, | Performed by: OTOLARYNGOLOGY

## 2022-09-01 PROCEDURE — 1101F PR PT FALLS ASSESS DOC 0-1 FALLS W/OUT INJ PAST YR: ICD-10-PCS | Mod: CPTII,S$GLB,, | Performed by: OTOLARYNGOLOGY

## 2022-09-01 PROCEDURE — 99999 PR PBB SHADOW E&M-EST. PATIENT-LVL IV: CPT | Mod: PBBFAC,,, | Performed by: OTOLARYNGOLOGY

## 2022-09-01 PROCEDURE — 71260 CT NECK CHEST WITH CONTRAST (XPD): ICD-10-PCS | Mod: 26,,, | Performed by: RADIOLOGY

## 2022-09-01 PROCEDURE — 3288F PR FALLS RISK ASSESSMENT DOCUMENTED: ICD-10-PCS | Mod: CPTII,S$GLB,, | Performed by: OTOLARYNGOLOGY

## 2022-09-01 PROCEDURE — 25500020 PHARM REV CODE 255

## 2022-09-01 PROCEDURE — 3288F FALL RISK ASSESSMENT DOCD: CPT | Mod: CPTII,S$GLB,, | Performed by: OTOLARYNGOLOGY

## 2022-09-01 PROCEDURE — 3074F PR MOST RECENT SYSTOLIC BLOOD PRESSURE < 130 MM HG: ICD-10-PCS | Mod: CPTII,S$GLB,, | Performed by: OTOLARYNGOLOGY

## 2022-09-01 RX ORDER — LIDOCAINE HYDROCHLORIDE 10 MG/ML
1 INJECTION, SOLUTION EPIDURAL; INFILTRATION; INTRACAUDAL; PERINEURAL ONCE
Status: CANCELLED | OUTPATIENT
Start: 2022-09-01 | End: 2022-09-01

## 2022-09-01 RX ORDER — SODIUM CHLORIDE 0.9 % (FLUSH) 0.9 %
10 SYRINGE (ML) INJECTION
Status: CANCELLED | OUTPATIENT
Start: 2022-09-01

## 2022-09-01 RX ADMIN — IOHEXOL 75 ML: 350 INJECTION, SOLUTION INTRAVENOUS at 02:09

## 2022-09-01 NOTE — PROGRESS NOTES
Chief Complaint   Patient presents with    consult/ oral ca dx     Oncology History   Malignant neoplasm of oral cavity   2013 Surgery    L Segmental mandibulectomy, chemo/RT - [facility] - 2013 2013 Initial Diagnosis    Malignant neoplasm of oral cavity     2015 Surgery     L mandible ORN, osteocutaneous RFFF - [facility] - ~2015       4/2018 Biopsy    Biopsy R RMT - [place] - 04/2018 5/30/2019 Surgery    1.  Composite resection of the right mandible, buccal mucosa and floor of mouth  2.  Right modified neck dissection including levels 1 B through 4  3.  Right fibula free flap with inset into the mouth.  4.  Mandibular reconstruction with transosteal plate.  5.  Right condylar reconstruction.  6.  Site preparation for flap inset.  7.  Tracheostomy.  8.  Neck exploration for vessels with anastomosis into the right facial artery and right common facial vein.  9.  Split-thickness skin graft from the leg measuring 15 x 6 cm.  10.  AlloDerm with inset into the right glenoid fossa.     7/9/2019 Cancer Staged    Staging form: Oral Cavity, AJCC 8th Edition  - Pathologic: Stage I (pT1, pN0, cM0) - Signed by Isabella Perea NP on 7/9/2019       Squamous cell carcinoma   5/30/2019 Initial Diagnosis    Squamous cell carcinoma           HPI   67 y.o. female presents with the above treatment history.  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  No significant complaints aside from pain at the site.  She is not had any recent imaging.    Review of Systems   Constitutional: Negative for fatigue and unexpected weight change.   HENT: Per HPI.  Eyes: Negative for visual disturbance.   Respiratory: Negative for shortness of breath, hemoptysis   Cardiovascular: Negative for chest pain and palpitations.   Musculoskeletal: Negative for decreased ROM, back pain.   Skin: Negative for rash, sunburn, itching.   Neurological: Negative for  dizziness and seizures.   Hematological: Negative for adenopathy. Does not bruise/bleed easily.   Endocrine: Negative for rapid weight loss/weight gain, heat/cold intolerance.     Past Medical History   Patient Active Problem List   Diagnosis    Malignant neoplasm of oral cavity    Squamous cell carcinoma    Moderate malnutrition    Gastroesophageal reflux disease    Decreased mobility           Past Surgical History   Past Surgical History:   Procedure Laterality Date    APPENDECTOMY      CHOLECYSTECTOMY      DISSECTION OF NECK Right 5/30/2019    Procedure: DISSECTION, NECK;  Surgeon: Wallace Santiago MD;  Location: [address];  Service: ENT;  Laterality: Right;    ESOPHAGOGASTRODUODENOSCOPY W/ PEG  5/30/2019    Procedure: EGD, WITH PEG TUBE INSERTION;  Surgeon: Ottoniel Jimenez MD;  Location: [address];  Service: General;;    FLAP PROCEDURE Right 5/30/2019    Procedure: CREATION, FREE FLAP;  Surgeon: Navdeep Dangelo MD;  Location: [address];  Service: Plastics;  Laterality: Right;  ischemia time 0093-4257  flap rt lower leg to rt neck    HYSTERECTOMY      MANDIBLE SURGERY      TRACHEOTOMY N/A 5/30/2019    Procedure: TRACHEOTOMY;  Surgeon: Navdeep Dangelo MD;  Location: [address];  Service: Plastics;  Laterality: N/A;         Family History   History reviewed. No pertinent family history.        Social History   .  Social History     Socioeconomic History    Marital status:    Tobacco Use    Smoking status: Former    Smokeless tobacco: Never         Allergies   Review of patient's allergies indicates:   Allergen Reactions    Bactrim [sulfamethoxazole-trimethoprim]     Clindamycin     Keflex [cephalexin]      Tolerated Unasyn 05/31/2019    Tramadol      DIZZY AND NAUSEA, & VOMITTING           Physical Exam     Vitals:    09/01/22 1135   BP: 126/80   Pulse: 66         Body mass index is 22.57 kg/m².      General: AOx3, NAD   Respiratory:  Symmetric chest rise, normal effort  Oral  Cavity:  Oral Tongue mobile.  There is an ulcerated lesion of the right buccal mucosa/skin paddle of the fibular free flap.  It involves the adjacent floor of mouth and abuts the oral tongue.  Oropharynx:  No masses/lesions of the posterior pharyngeal wall. Tonsillar fossa without lesions. Soft palate without masses. Midline uvula.   Neck:  Well-healed neck dissection/trach scars.  Marked fibrosis status post surgery and radiation..  No cervical lymphadenopathy, thyromegaly or thyroid nodules.  Normal range of motion.    Face: House Brackmann I bilaterally.     Outside records reviewed.    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  Her current tumor appears to be limited to the buccal mucosa though I am uncertain of the involvement of the underlying fibular free flap.  CT neck and chest ordered.  I will contact her with results.  Pending scan results, I recommended that we proceed to the operating room for resection of this lesion and reconstruction either utilizing a soft tissue or bony free flap.  She will also require PEG and trach placement.  She understands and wishes to proceed.  Surgery is scheduled on 9/28/22.         Relevant Orders    CT Neck Chest With Contrast (XPD) (Completed)    Creatinine, serum    Case Request Operating Room: EXCISION, NEOPLASM, MOUTH, TRACHEOTOMY, DISSECTION, NECK, CREATION, FREE FLAP (Completed)

## 2022-09-01 NOTE — H&P (VIEW-ONLY)
Chief Complaint   Patient presents with    consult/ oral ca dx     Oncology History   Malignant neoplasm of oral cavity   2013 Surgery    L Segmental mandibulectomy, chemo/RT - [facility] - 2013 2013 Initial Diagnosis    Malignant neoplasm of oral cavity     2015 Surgery     L mandible ORN, osteocutaneous RFFF - [facility] - ~2015       4/2018 Biopsy    Biopsy R RMT - [place] - 04/2018 5/30/2019 Surgery    1.  Composite resection of the right mandible, buccal mucosa and floor of mouth  2.  Right modified neck dissection including levels 1 B through 4  3.  Right fibula free flap with inset into the mouth.  4.  Mandibular reconstruction with transosteal plate.  5.  Right condylar reconstruction.  6.  Site preparation for flap inset.  7.  Tracheostomy.  8.  Neck exploration for vessels with anastomosis into the right facial artery and right common facial vein.  9.  Split-thickness skin graft from the leg measuring 15 x 6 cm.  10.  AlloDerm with inset into the right glenoid fossa.     7/9/2019 Cancer Staged    Staging form: Oral Cavity, AJCC 8th Edition  - Pathologic: Stage I (pT1, pN0, cM0) - Signed by Isabella Perea NP on 7/9/2019       Squamous cell carcinoma   5/30/2019 Initial Diagnosis    Squamous cell carcinoma           HPI   67 y.o. female presents with the above treatment history.  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  No significant complaints aside from pain at the site.  She is not had any recent imaging.    Review of Systems   Constitutional: Negative for fatigue and unexpected weight change.   HENT: Per HPI.  Eyes: Negative for visual disturbance.   Respiratory: Negative for shortness of breath, hemoptysis   Cardiovascular: Negative for chest pain and palpitations.   Musculoskeletal: Negative for decreased ROM, back pain.   Skin: Negative for rash, sunburn, itching.   Neurological: Negative for  dizziness and seizures.   Hematological: Negative for adenopathy. Does not bruise/bleed easily.   Endocrine: Negative for rapid weight loss/weight gain, heat/cold intolerance.     Past Medical History   Patient Active Problem List   Diagnosis    Malignant neoplasm of oral cavity    Squamous cell carcinoma    Moderate malnutrition    Gastroesophageal reflux disease    Decreased mobility           Past Surgical History   Past Surgical History:   Procedure Laterality Date    APPENDECTOMY      CHOLECYSTECTOMY      DISSECTION OF NECK Right 5/30/2019    Procedure: DISSECTION, NECK;  Surgeon: Wallace Santiago MD;  Location: [address];  Service: ENT;  Laterality: Right;    ESOPHAGOGASTRODUODENOSCOPY W/ PEG  5/30/2019    Procedure: EGD, WITH PEG TUBE INSERTION;  Surgeon: Ottoniel Jimenez MD;  Location: [address];  Service: General;;    FLAP PROCEDURE Right 5/30/2019    Procedure: CREATION, FREE FLAP;  Surgeon: Navdeep Dangelo MD;  Location: [address];  Service: Plastics;  Laterality: Right;  ischemia time 2768-2630  flap rt lower leg to rt neck    HYSTERECTOMY      MANDIBLE SURGERY      TRACHEOTOMY N/A 5/30/2019    Procedure: TRACHEOTOMY;  Surgeon: aNvdeep Dangelo MD;  Location: [address];  Service: Plastics;  Laterality: N/A;         Family History   History reviewed. No pertinent family history.        Social History   .  Social History     Socioeconomic History    Marital status:    Tobacco Use    Smoking status: Former    Smokeless tobacco: Never         Allergies   Review of patient's allergies indicates:   Allergen Reactions    Bactrim [sulfamethoxazole-trimethoprim]     Clindamycin     Keflex [cephalexin]      Tolerated Unasyn 05/31/2019    Tramadol      DIZZY AND NAUSEA, & VOMITTING           Physical Exam     Vitals:    09/01/22 1135   BP: 126/80   Pulse: 66         Body mass index is 22.57 kg/m².      General: AOx3, NAD   Respiratory:  Symmetric chest rise, normal effort  Oral  Cavity:  Oral Tongue mobile.  There is an ulcerated lesion of the right buccal mucosa/skin paddle of the fibular free flap.  It involves the adjacent floor of mouth and abuts the oral tongue.  Oropharynx:  No masses/lesions of the posterior pharyngeal wall. Tonsillar fossa without lesions. Soft palate without masses. Midline uvula.   Neck:  Well-healed neck dissection/trach scars.  Marked fibrosis status post surgery and radiation..  No cervical lymphadenopathy, thyromegaly or thyroid nodules.  Normal range of motion.    Face: House Brackmann I bilaterally.     Outside records reviewed.    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  Her current tumor appears to be limited to the buccal mucosa though I am uncertain of the involvement of the underlying fibular free flap.  CT neck and chest ordered.  I will contact her with results.  Pending scan results, I recommended that we proceed to the operating room for resection of this lesion and reconstruction either utilizing a soft tissue or bony free flap.  She will also require PEG and trach placement.  She understands and wishes to proceed.  Surgery is scheduled on 9/28/22.         Relevant Orders    CT Neck Chest With Contrast (XPD) (Completed)    Creatinine, serum    Case Request Operating Room: EXCISION, NEOPLASM, MOUTH, TRACHEOTOMY, DISSECTION, NECK, CREATION, FREE FLAP (Completed)

## 2022-09-05 NOTE — ASSESSMENT & PLAN NOTE
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  Her current tumor appears to be limited to the buccal mucosa though I am uncertain of the involvement of the underlying fibular free flap.  CT neck and chest ordered.  I will contact her with results.  Pending scan results, I recommended that we proceed to the operating room for resection of this lesion and reconstruction either utilizing a soft tissue or bony free flap.  She will also require PEG and trach placement.  She understands and wishes to proceed.  Surgery is scheduled on 9/28/22.

## 2022-09-22 DIAGNOSIS — Z01.818 PRE-OP TESTING: Primary | ICD-10-CM

## 2022-09-22 RX ORDER — PANTOPRAZOLE SODIUM 40 MG/1
40 TABLET, DELAYED RELEASE ORAL EVERY MORNING
Status: ON HOLD | COMMUNITY
Start: 2022-06-28 | End: 2022-10-06 | Stop reason: HOSPADM

## 2022-09-22 NOTE — ANESTHESIA PAT ROS NOTE
09/22/2022  Jennifer Andre is a 67 y.o., female.      Pre-op Assessment          Review of Systems  Anesthesia Hx:   Denies Hx of Anesthetic complications  History of prior surgery of interest to airway management or planning: jaw, facial plastic/reconstructive, tracheostomy. Previous anesthesia: General MANDIBULECTOMY (Right: Face) DISSECTION, NECK (Right: Neck) CREATION, FREE FLAP (Right: Leg Lower) TRACHEOTOMY (Neck) EGD, WITH PEG TUBE INSERTION (Abdomen) 5/30/19 with general anesthesia.  Procedure performed at an [facility].      Airway issues documented on chart review include GETA     Denies Family Hx of Anesthesia complications.    Denies Personal Hx of Anesthesia complications.                    Social:  Former Smoker, No Alcohol Use       Hematology/Oncology:  Hematology Normal                     Current/Recent Cancer.         chemotherapy, radiation and surgery   Oncology Comments: MALIGNANT NEOPLASM OF ORAL CAVITY     EENT/Dental:   ORAL CANCER          Cardiovascular:            Denies Angina.          Functional Capacity good / => 4 METS                         Pulmonary:       Denies Shortness of breath.  Denies Recent URI.                 Renal/:  Renal/ Normal                 Hepatic/GI:     GERD             Musculoskeletal:  Musculoskeletal Normal                Neurological:  Neurology Normal                                      Endocrine:        Metabolic Disorders, Malnutrition  Dermatological:  PSORIASIS   Psych:  Psychiatric Normal                     Anesthesia Assessment: Preoperative EQUATION    Planned Procedure: Procedure(s) (LRB):  EXCISION, NEOPLASM, MOUTH (Right)  TRACHEOTOMY (N/A)  DISSECTION, NECK (Bilateral)  CREATION, FREE FLAP (N/A)  INSERTION, PEG TUBE (N/A)  Requested Anesthesia Type:General/MAC  Surgeon: Wallace Santiago MD  Service: ENT  Known or  anticipated Date of Surgery:9/28/2022    Surgeon notes: reviewed    Electronic QUestionnaire Assessment completed via nurse interview with patient.        Triage considerations:     The patient has no apparent active cardiac condition (No unstable coronary Syndrome such as severe unstable angina or recent [<1 month] myocardial infarction, decompensated CHF, severe valvular   disease or significant arrhythmia)    Previous anesthesia records:GETA and No problems    Airway/Jaw/Neck:  Airway Findings: Mouth Opening: Small, but > 3cm Tongue: Normal  General Airway Assessment: Adult  Oropharynx Findings: (mandibulectomy left )  Mallampati: II  TM Distance: 4 - 6 cm       6.0 reinforced ett inserted into trach via surgeon; ETT removed per surgeon request    Last PCP note: outside [facility]   Subspecialty notes: ENT    Other important co-morbidities: GERD and HX OF TRACH/NECK DISSECTION/FLAP/MANDIBULECTOMY, MALIGNANT NEOPLASM OF ORAL CAVITY       Tests already available:  Available tests,  within [facility] .     9/1/22  CREATININE, CT NECK CHEST            Instructions given. (See in Nurse's note)    Optimization:  Anesthesia Preop Clinic Assessment Indicated PATIENT UNABLE DUE TO TRAVEL HARDSHIP    Medical Opinion Indicated           Plan:    Testing:  BMP, EKG, Hematology Profile, and T&S  T&S TO BE DONE AM OF SURGERY     Consultation:Patient's PCP for a statement of optimization      Patient  has previously scheduled Medical Appointment: NOT AT THIS TIME    Navigation: Tests Scheduled.              Consults scheduled.             Results will be tracked by Preop Clinic.    DISCUSSED CASE WITH DR. CARLOS. PATIENT MISSED MEDICAL OPTIMIZATION APPOINTMENT WITH PCP DUE TO MISCOMMUNICATION. DR. CARLOS REVIEWED CHART. OKAY TO PROCEED WITH CANCER SURGERY FROM ANESTHESIA STANDPOINT AT THIS TIME. PRE OP LABS/EKG TO BE COMPLETED @ [facility] [place] [facility] 9/27. DR. JIMENEZ UPDATED AND OKAY TO PROCEED FROM HIS STANDPOINT AS WELL.

## 2022-09-25 RX ORDER — SODIUM CHLORIDE 9 MG/ML
INJECTION, SOLUTION INTRAVENOUS CONTINUOUS
Status: CANCELLED | OUTPATIENT
Start: 2022-09-25

## 2022-09-26 ENCOUNTER — ANESTHESIA EVENT (OUTPATIENT)
Dept: SURGERY | Facility: HOSPITAL | Age: 67
DRG: 012 | End: 2022-09-26
Payer: MEDICARE

## 2022-09-26 DIAGNOSIS — Z01.818 PRE-OP TESTING: Primary | ICD-10-CM

## 2022-09-27 ENCOUNTER — TELEPHONE (OUTPATIENT)
Dept: OTOLARYNGOLOGY | Facility: CLINIC | Age: 67
End: 2022-09-27
Payer: MEDICARE

## 2022-09-27 ENCOUNTER — HOSPITAL ENCOUNTER (OUTPATIENT)
Dept: CARDIOLOGY | Facility: CLINIC | Age: 67
Discharge: HOME | End: 2022-09-27
Payer: MEDICARE

## 2022-09-27 DIAGNOSIS — Z01.818 PRE-OP TESTING: ICD-10-CM

## 2022-09-27 PROCEDURE — 93010 EKG 12-LEAD: ICD-10-PCS | Mod: S$GLB,,, | Performed by: INTERNAL MEDICINE

## 2022-09-27 PROCEDURE — 93005 EKG 12-LEAD: ICD-10-PCS | Mod: S$GLB,,, | Performed by: ANESTHESIOLOGY

## 2022-09-27 PROCEDURE — 93010 ELECTROCARDIOGRAM REPORT: CPT | Mod: S$GLB,,, | Performed by: INTERNAL MEDICINE

## 2022-09-27 PROCEDURE — 93005 ELECTROCARDIOGRAM TRACING: CPT | Mod: S$GLB,,, | Performed by: ANESTHESIOLOGY

## 2022-09-27 RX ORDER — HALOPERIDOL 5 MG/ML
0.5 INJECTION INTRAMUSCULAR EVERY 10 MIN PRN
Status: CANCELLED | OUTPATIENT
Start: 2022-09-27

## 2022-09-27 RX ORDER — FENTANYL CITRATE 50 UG/ML
25 INJECTION, SOLUTION INTRAMUSCULAR; INTRAVENOUS EVERY 5 MIN PRN
Status: CANCELLED | OUTPATIENT
Start: 2022-09-27

## 2022-09-27 RX ORDER — HYDROMORPHONE HYDROCHLORIDE 1 MG/ML
0.2 INJECTION, SOLUTION INTRAMUSCULAR; INTRAVENOUS; SUBCUTANEOUS EVERY 10 MIN PRN
Status: CANCELLED | OUTPATIENT
Start: 2022-09-27

## 2022-09-27 RX ORDER — SODIUM CHLORIDE 0.9 % (FLUSH) 0.9 %
10 SYRINGE (ML) INJECTION
Status: CANCELLED | OUTPATIENT
Start: 2022-09-27

## 2022-09-27 NOTE — ANESTHESIA PREPROCEDURE EVALUATION
[facility]  Anesthesia Pre-Operative Evaluation        Patient Name: Jennifer Andre  YOB: 1955  MRN: [id number]    SUBJECTIVE:     Pre-operative Evaluation for Procedure(s) (LRB):  EXCISION, NEOPLASM, MOUTH (Right)  TRACHEOTOMY (N/A)  DISSECTION, NECK (Bilateral)  CREATION, FREE FLAP (N/A)  INSERTION, PEG TUBE (N/A)     09/27/2022    Jennifer Andre is a 67 y.o. female with a PMHx significant for GERD, Dysphagia,.     She now presents for the above procedure(s).    Pertinent Cardiac Studies:  TTE [facility]   No results found for this or any previous visit.   No results found for this or any previous visit.         Previous Airway: 05/30/19; Placement Time: 1112; Method of Intubation: Direct laryngoscopy; Inserted by: CRNA; Airway Device: Endotracheal Tube; Mask Ventilation: Easy; Intubated: Postinduction; Blade: Jose #3; Airway Device Size: 7.0; Style: Cuffed; Cuff Inflation: Minimal occlusive pressure; Inflation Amount: 7; Placement Verified By: Auscultation, Capnometry, ETT Condensation; Grade: Grade I; Complicating Factors: None; Intubation Findings: Positive EtCO2, Bilateral breath sounds, Atraumatic/Condition of teeth unchanged;  Depth of Insertion: 20; Securment: Lips; Complications: None; Breath Sounds: Equal Bilateral; Insertion Attempts: 1; Removal Date: 05/30/19 (per MD);  Removal Time: 1115; Removal Indication & Assessment: removed by MD    Patient Active Problem List   Diagnosis    Malignant neoplasm of oral cavity    Squamous cell carcinoma    Moderate malnutrition    Gastroesophageal reflux disease    Decreased mobility       Review of patient's allergies indicates:   Allergen Reactions    Bactrim [sulfamethoxazole-trimethoprim]     Clindamycin     Keflex [cephalexin]      Tolerated Unasyn 05/31/2019    Tramadol      DIZZY AND NAUSEA, & VOMITTING       Current Outpatient Medications   Medication Instructions    pantoprazole (PROTONIX) 40 mg, Oral, Every  morning       Past Surgical History:   Procedure Laterality Date    APPENDECTOMY      CHOLECYSTECTOMY      DISSECTION OF NECK Right 5/30/2019    Procedure: DISSECTION, NECK;  Surgeon: Wallace Santiago MD;  Location: [address];  Service: ENT;  Laterality: Right;    ESOPHAGOGASTRODUODENOSCOPY W/ PEG  5/30/2019    Procedure: EGD, WITH PEG TUBE INSERTION;  Surgeon: Ottoniel Jimenez MD;  Location: [address];  Service: General;;    FLAP PROCEDURE Right 5/30/2019    Procedure: CREATION, FREE FLAP;  Surgeon: Navdeep Dangelo MD;  Location: [address];  Service: Plastics;  Laterality: Right;  ischemia time 7098-5534  flap rt lower leg to rt neck    HYSTERECTOMY      MANDIBLE SURGERY      TRACHEOTOMY N/A 5/30/2019    Procedure: TRACHEOTOMY;  Surgeon: Navdeep Dangelo MD;  Location: [address];  Service: Plastics;  Laterality: N/A;       Social History     Substance and Sexual Activity   Drug Use Not on file     Alcohol Use: Not on file     Tobacco Use: Medium Risk    Smoking Tobacco Use: Former    Smokeless Tobacco Use: Never    Passive Exposure: Not on file       OBJECTIVE:     Vital Signs Range (Last 24H):         Significant Labs    Heme Profile  Lab Results   Component Value Date    WBC 8.18 09/27/2022    HGB 13.2 09/27/2022    HCT 40.9 09/27/2022     09/27/2022       Coagulation Studies  Lab Results   Component Value Date    LABPROT 11.5 05/30/2019    INR 1.1 05/30/2019    APTT 21.0 05/30/2019       BMP  Lab Results   Component Value Date     09/27/2022    K 4.3 09/27/2022     09/27/2022    CO2 25 09/27/2022    BUN 8 09/27/2022    CREATININE 0.8 09/27/2022    MG 1.9 06/08/2019    PHOS 4.0 06/08/2019       Liver Function Tests  Lab Results   Component Value Date    AST 18 06/08/2019    ALT 34 06/08/2019    ALKPHOS 67 06/08/2019    BILITOT 0.3 06/08/2019    PROT 6.2 06/08/2019    ALBUMIN 2.4 (L) 06/08/2019       Lipid Profile  No results found for: CHOL, HDL,  LDLDIRECT, TRIG    Endocrine Profile  No results found for: HGBA1C, TSH    Diagnostic Studies    Cardiac Studies    EKG:   Results for orders placed or performed during the hospital encounter of 09/27/22   EKG 12-lead    Collection Time: 09/27/22  1:30 PM    Narrative    Test Reason : Z01.818,    Vent. Rate : 069 BPM     Atrial Rate : 069 BPM     P-R Int : 130 ms          QRS Dur : 076 ms      QT Int : 418 ms       P-R-T Axes : 009 021 033 degrees     QTc Int : 447 ms    Normal sinus rhythm  Normal ECG  No previous ECGs available  Confirmed by LEROY WALLACE MD ([id number]) on 9/27/2022 3:20:30 PM    Referred By: KRISTAL CARLOS           Confirmed By:LEROY WALLACE MD       TTE:  No results found for this or any previous visit.      WONG:  No results found for this or any previous visit.      ASSESSMENT/PLAN:         Pre-op Assessment    I have reviewed the Patient Summary Reports.     I have reviewed the Nursing Notes. I have reviewed the NPO Status.   I have reviewed the Medications.     Review of Systems  Anesthesia Hx:  History of prior surgery of interest to airway management or planning: jaw, facial plastic/reconstructive, tracheostomy. Previous anesthesia: General MANDIBULECTOMY (Right: Face) DISSECTION, NECK (Right: Neck) CREATION, FREE FLAP (Right: Leg Lower) TRACHEOTOMY (Neck) EGD, WITH PEG TUBE INSERTION (Abdomen) 5/30/19 with general anesthesia.  Procedure performed at an [facility]. Airway issues documented on chart review include GETA  Denies Family Hx of Anesthesia complications.   Denies Personal Hx of Anesthesia complications.   Social:  Former Smoker, No Alcohol Use    Hematology/Oncology:  Hematology Normal      Current/Recent Cancer. chemotherapy, radiation and surgery Oncology Comments: MALIGNANT NEOPLASM OF ORAL CAVITY     EENT/Dental:   ORAL CANCER   Cardiovascular:    Denies Angina.  Functional Capacity good / => 4 METS    Pulmonary:   Denies Shortness of breath.  Denies Recent URI.     Renal/:  Renal/ Normal     Hepatic/GI:   GERD    Musculoskeletal:  Musculoskeletal Normal    Neurological:  Neurology Normal    Endocrine:  Metabolic Disorders, Malnutrition  Dermatological:   PSORIASIS   Psych:  Psychiatric Normal              Anesthesia Plan  Type of Anesthesia, risks & benefits discussed:    Anesthesia Type: Gen ETT  Intra-op Monitoring Plan: Standard ASA Monitors and Art Line  Post Op Pain Control Plan: multimodal analgesia and IV/PO Opioids PRN  Induction:  IV  Airway Plan: Direct, Post-Induction  Informed Consent: Informed consent signed with the Patient and all parties understand the risks and agree with anesthesia plan.  All questions answered.   ASA Score: 3  Day of Surgery Review of History & Physical: H&P Update referred to the surgeon/provider.    Ready For Surgery From Anesthesia Perspective.     .

## 2022-09-28 ENCOUNTER — HOSPITAL ENCOUNTER (INPATIENT)
Facility: HOSPITAL | Age: 67
LOS: 8 days | Discharge: HOME HEALTH | DRG: 012 | End: 2022-10-06
Attending: OTOLARYNGOLOGY | Admitting: OTOLARYNGOLOGY
Payer: MEDICARE

## 2022-09-28 ENCOUNTER — ANESTHESIA (OUTPATIENT)
Dept: SURGERY | Facility: HOSPITAL | Age: 67
DRG: 012 | End: 2022-09-28
Payer: MEDICARE

## 2022-09-28 DIAGNOSIS — S01.502A OPEN WOUND OF MOUTH, INITIAL ENCOUNTER: Primary | ICD-10-CM

## 2022-09-28 DIAGNOSIS — S21.101A OPEN WOUND OF RIGHT CHEST WALL, INITIAL ENCOUNTER: ICD-10-CM

## 2022-09-28 DIAGNOSIS — Z01.818 PRE-OP TESTING: ICD-10-CM

## 2022-09-28 DIAGNOSIS — Z93.0 TRACHEOSTOMY PRESENT: ICD-10-CM

## 2022-09-28 DIAGNOSIS — C06.9 MALIGNANT NEOPLASM OF ORAL CAVITY: ICD-10-CM

## 2022-09-28 DIAGNOSIS — S11.90XA: ICD-10-CM

## 2022-09-28 LAB
ALBUMIN SERPL BCP-MCNC: 2.9 G/DL (ref 3.5–5.2)
ALLENS TEST: ABNORMAL
ALP SERPL-CCNC: 69 U/L (ref 55–135)
ALT SERPL W/O P-5'-P-CCNC: 9 U/L (ref 10–44)
ANION GAP SERPL CALC-SCNC: 8 MMOL/L (ref 8–16)
AST SERPL-CCNC: 16 U/L (ref 10–40)
BILIRUB SERPL-MCNC: 0.3 MG/DL (ref 0.1–1)
BUN SERPL-MCNC: 7 MG/DL (ref 8–23)
CALCIUM SERPL-MCNC: 7.4 MG/DL (ref 8.7–10.5)
CHLORIDE SERPL-SCNC: 112 MMOL/L (ref 95–110)
CO2 SERPL-SCNC: 18 MMOL/L (ref 23–29)
CREAT SERPL-MCNC: 0.6 MG/DL (ref 0.5–1.4)
DELSYS: ABNORMAL
ERYTHROCYTE [DISTWIDTH] IN BLOOD BY AUTOMATED COUNT: 12.8 % (ref 11.5–14.5)
EST. GFR  (NO RACE VARIABLE): >60 ML/MIN/1.73 M^2
FIO2: 28
FLOW: 5
GLUCOSE SERPL-MCNC: 146 MG/DL (ref 70–110)
HCO3 UR-SCNC: 20.7 MMOL/L (ref 24–28)
HCT VFR BLD AUTO: 33.6 % (ref 37–48.5)
HGB BLD-MCNC: 11 G/DL (ref 12–16)
MCH RBC QN AUTO: 31.3 PG (ref 27–31)
MCHC RBC AUTO-ENTMCNC: 32.7 G/DL (ref 32–36)
MCV RBC AUTO: 96 FL (ref 82–98)
MODE: ABNORMAL
PCO2 BLDA: 39.1 MMHG (ref 35–45)
PH SMN: 7.33 [PH] (ref 7.35–7.45)
PLATELET # BLD AUTO: 171 K/UL (ref 150–450)
PMV BLD AUTO: 10.6 FL (ref 9.2–12.9)
PO2 BLDA: 73 MMHG (ref 80–100)
POC BE: -5 MMOL/L
POC SATURATED O2: 93 % (ref 95–100)
POC TCO2: 22 MMOL/L (ref 23–27)
POCT GLUCOSE: 109 MG/DL (ref 70–110)
POTASSIUM SERPL-SCNC: 4.2 MMOL/L (ref 3.5–5.1)
PROT SERPL-MCNC: 5.5 G/DL (ref 6–8.4)
RBC # BLD AUTO: 3.51 M/UL (ref 4–5.4)
SAMPLE: ABNORMAL
SITE: ABNORMAL
SODIUM SERPL-SCNC: 138 MMOL/L (ref 136–145)
SP02: 100
WBC # BLD AUTO: 12.22 K/UL (ref 3.9–12.7)

## 2022-09-28 PROCEDURE — 80053 COMPREHEN METABOLIC PANEL: CPT | Performed by: STUDENT IN AN ORGANIZED HEALTH CARE EDUCATION/TRAINING PROGRAM

## 2022-09-28 PROCEDURE — 85027 COMPLETE CBC AUTOMATED: CPT | Performed by: STUDENT IN AN ORGANIZED HEALTH CARE EDUCATION/TRAINING PROGRAM

## 2022-09-28 PROCEDURE — 40845 PR RECONSTRUC MOUTH COMPLEX: ICD-10-PCS | Mod: 51,,, | Performed by: OTOLARYNGOLOGY

## 2022-09-28 PROCEDURE — C1769 GUIDE WIRE: HCPCS | Performed by: OTOLARYNGOLOGY

## 2022-09-28 PROCEDURE — D9220A PRA ANESTHESIA: ICD-10-PCS | Mod: ,,, | Performed by: INTERNAL MEDICINE

## 2022-09-28 PROCEDURE — 36000707: Performed by: OTOLARYNGOLOGY

## 2022-09-28 PROCEDURE — 25000003 PHARM REV CODE 250

## 2022-09-28 PROCEDURE — 88331 PR  PATH CONSULT IN SURG,W FRZ SEC: ICD-10-PCS | Mod: 26,,, | Performed by: PATHOLOGY

## 2022-09-28 PROCEDURE — 27000221 HC OXYGEN, UP TO 24 HOURS

## 2022-09-28 PROCEDURE — 15734 PR MUSCLE-SKIN FLAP,TRUNK: ICD-10-PCS | Mod: ,,, | Performed by: OTOLARYNGOLOGY

## 2022-09-28 PROCEDURE — 63600175 PHARM REV CODE 636 W HCPCS

## 2022-09-28 PROCEDURE — 20000000 HC ICU ROOM

## 2022-09-28 PROCEDURE — 40845 RECONSTRUCTION OF MOUTH: CPT | Mod: 51,,, | Performed by: OTOLARYNGOLOGY

## 2022-09-28 PROCEDURE — 25000003 PHARM REV CODE 250: Performed by: STUDENT IN AN ORGANIZED HEALTH CARE EDUCATION/TRAINING PROGRAM

## 2022-09-28 PROCEDURE — 99900035 HC TECH TIME PER 15 MIN (STAT)

## 2022-09-28 PROCEDURE — 82803 BLOOD GASES ANY COMBINATION: CPT

## 2022-09-28 PROCEDURE — 94761 N-INVAS EAR/PLS OXIMETRY MLT: CPT

## 2022-09-28 PROCEDURE — 88331 PATH CONSLTJ SURG 1 BLK 1SPC: CPT | Performed by: PATHOLOGY

## 2022-09-28 PROCEDURE — 63600175 PHARM REV CODE 636 W HCPCS: Performed by: OTOLARYNGOLOGY

## 2022-09-28 PROCEDURE — 41120 PARTIAL REMOVAL OF TONGUE: CPT | Mod: ,,, | Performed by: OTOLARYNGOLOGY

## 2022-09-28 PROCEDURE — 88332 PATH CONSLTJ SURG EA ADD BLK: CPT | Mod: 26,,, | Performed by: PATHOLOGY

## 2022-09-28 PROCEDURE — 14302 TIS TRNFR ADDL 30 SQ CM: CPT | Mod: ,,, | Performed by: OTOLARYNGOLOGY

## 2022-09-28 PROCEDURE — 63600175 PHARM REV CODE 636 W HCPCS: Performed by: STUDENT IN AN ORGANIZED HEALTH CARE EDUCATION/TRAINING PROGRAM

## 2022-09-28 PROCEDURE — 88332 PR  PATH CONSULT IN SURG,W ADDN FRZ SEC: ICD-10-PCS | Mod: 26,,, | Performed by: PATHOLOGY

## 2022-09-28 PROCEDURE — 88305 TISSUE EXAM BY PATHOLOGIST: ICD-10-PCS | Mod: 26,,, | Performed by: PATHOLOGY

## 2022-09-28 PROCEDURE — 27800903 OPTIME MED/SURG SUP & DEVICES OTHER IMPLANTS: Performed by: OTOLARYNGOLOGY

## 2022-09-28 PROCEDURE — 14301 PR ADJ TISS XFER ANY AREA,30.1-60 SQCM: ICD-10-PCS | Mod: 51,,, | Performed by: OTOLARYNGOLOGY

## 2022-09-28 PROCEDURE — 37000009 HC ANESTHESIA EA ADD 15 MINS: Performed by: OTOLARYNGOLOGY

## 2022-09-28 PROCEDURE — 37799 UNLISTED PX VASCULAR SURGERY: CPT

## 2022-09-28 PROCEDURE — 14302 PR ADJ TISS XFER ANY AREA,EA ADD 30.0 SQCM: ICD-10-PCS | Mod: ,,, | Performed by: OTOLARYNGOLOGY

## 2022-09-28 PROCEDURE — 42950 RECONSTRUCTION OF THROAT: CPT | Mod: 51,,, | Performed by: OTOLARYNGOLOGY

## 2022-09-28 PROCEDURE — 88305 TISSUE EXAM BY PATHOLOGIST: CPT | Mod: 26,,, | Performed by: PATHOLOGY

## 2022-09-28 PROCEDURE — 42950 PR RECONSTRUCTION OF THROAT: ICD-10-PCS | Mod: 51,,, | Performed by: OTOLARYNGOLOGY

## 2022-09-28 PROCEDURE — 31600 PLANNED TRACHEOSTOMY: CPT | Mod: 51,,, | Performed by: OTOLARYNGOLOGY

## 2022-09-28 PROCEDURE — 36620 RIGHT RADIAL ARTERIAL LINE: ICD-10-PCS | Mod: 59,,, | Performed by: INTERNAL MEDICINE

## 2022-09-28 PROCEDURE — 36000706: Performed by: OTOLARYNGOLOGY

## 2022-09-28 PROCEDURE — 15734 MUSCLE-SKIN GRAFT TRUNK: CPT | Mod: ,,, | Performed by: OTOLARYNGOLOGY

## 2022-09-28 PROCEDURE — 88342 IMHCHEM/IMCYTCHM 1ST ANTB: CPT | Mod: 26,,, | Performed by: PATHOLOGY

## 2022-09-28 PROCEDURE — 41120 PR PART REMOVAL TONGUE,<1/2: ICD-10-PCS | Mod: ,,, | Performed by: OTOLARYNGOLOGY

## 2022-09-28 PROCEDURE — 27201037 HC PRESSURE MONITORING SET UP

## 2022-09-28 PROCEDURE — 88342 CHG IMMUNOCYTOCHEMISTRY: ICD-10-PCS | Mod: 26,,, | Performed by: PATHOLOGY

## 2022-09-28 PROCEDURE — 88309 TISSUE EXAM BY PATHOLOGIST: CPT | Mod: 26,,, | Performed by: PATHOLOGY

## 2022-09-28 PROCEDURE — 25000003 PHARM REV CODE 250: Performed by: OTOLARYNGOLOGY

## 2022-09-28 PROCEDURE — 88309 TISSUE EXAM BY PATHOLOGIST: CPT | Performed by: PATHOLOGY

## 2022-09-28 PROCEDURE — 36620 INSERTION CATHETER ARTERY: CPT | Mod: 59,,, | Performed by: INTERNAL MEDICINE

## 2022-09-28 PROCEDURE — D9220A PRA ANESTHESIA: Mod: ,,, | Performed by: INTERNAL MEDICINE

## 2022-09-28 PROCEDURE — 31600 PR TRACHEOSTOMY, PLANNED: ICD-10-PCS | Mod: 51,,, | Performed by: OTOLARYNGOLOGY

## 2022-09-28 PROCEDURE — 43246 PR EGD, FLEX, W/PLCMT, GASTROSTOMY TUBE: ICD-10-PCS | Mod: ,,, | Performed by: SURGERY

## 2022-09-28 PROCEDURE — 42120 PR REMOVE PALATE/LESION: ICD-10-PCS | Mod: 51,,, | Performed by: OTOLARYNGOLOGY

## 2022-09-28 PROCEDURE — 43246 EGD PLACE GASTROSTOMY TUBE: CPT | Mod: ,,, | Performed by: SURGERY

## 2022-09-28 PROCEDURE — 88309 PR  SURG PATH,LEVEL VI: ICD-10-PCS | Mod: 26,,, | Performed by: PATHOLOGY

## 2022-09-28 PROCEDURE — 42120 REMOVE PALATE/LESION: CPT | Mod: 51,,, | Performed by: OTOLARYNGOLOGY

## 2022-09-28 PROCEDURE — 88331 PATH CONSLTJ SURG 1 BLK 1SPC: CPT | Mod: 26,,, | Performed by: PATHOLOGY

## 2022-09-28 PROCEDURE — 14301 TIS TRNFR ANY 30.1-60 SQ CM: CPT | Mod: 51,,, | Performed by: OTOLARYNGOLOGY

## 2022-09-28 PROCEDURE — 88342 IMHCHEM/IMCYTCHM 1ST ANTB: CPT | Performed by: PATHOLOGY

## 2022-09-28 PROCEDURE — 88305 TISSUE EXAM BY PATHOLOGIST: CPT | Performed by: PATHOLOGY

## 2022-09-28 PROCEDURE — 88332 PATH CONSLTJ SURG EA ADD BLK: CPT | Performed by: PATHOLOGY

## 2022-09-28 PROCEDURE — 27201423 OPTIME MED/SURG SUP & DEVICES STERILE SUPPLY: Performed by: OTOLARYNGOLOGY

## 2022-09-28 PROCEDURE — 37000008 HC ANESTHESIA 1ST 15 MINUTES: Performed by: OTOLARYNGOLOGY

## 2022-09-28 PROCEDURE — C1729 CATH, DRAINAGE: HCPCS | Performed by: OTOLARYNGOLOGY

## 2022-09-28 RX ORDER — CALCIUM GLUCONATE 20 MG/ML
1 INJECTION, SOLUTION INTRAVENOUS
Status: DISCONTINUED | OUTPATIENT
Start: 2022-09-28 | End: 2022-10-04

## 2022-09-28 RX ORDER — SODIUM CHLORIDE 0.9 % (FLUSH) 0.9 %
10 SYRINGE (ML) INJECTION
Status: DISCONTINUED | OUTPATIENT
Start: 2022-09-28 | End: 2022-09-28 | Stop reason: HOSPADM

## 2022-09-28 RX ORDER — ACETAMINOPHEN 325 MG/1
650 TABLET ORAL EVERY 6 HOURS
Status: DISCONTINUED | OUTPATIENT
Start: 2022-09-28 | End: 2022-10-06 | Stop reason: HOSPADM

## 2022-09-28 RX ORDER — DIPHENHYDRAMINE HYDROCHLORIDE 50 MG/ML
25 INJECTION INTRAMUSCULAR; INTRAVENOUS EVERY 4 HOURS PRN
Status: DISCONTINUED | OUTPATIENT
Start: 2022-09-28 | End: 2022-09-29

## 2022-09-28 RX ORDER — DEXAMETHASONE SODIUM PHOSPHATE 4 MG/ML
INJECTION, SOLUTION INTRA-ARTICULAR; INTRALESIONAL; INTRAMUSCULAR; INTRAVENOUS; SOFT TISSUE
Status: DISCONTINUED | OUTPATIENT
Start: 2022-09-28 | End: 2022-09-28

## 2022-09-28 RX ORDER — MAGNESIUM SULFATE HEPTAHYDRATE 40 MG/ML
4 INJECTION, SOLUTION INTRAVENOUS
Status: DISCONTINUED | OUTPATIENT
Start: 2022-09-28 | End: 2022-10-04

## 2022-09-28 RX ORDER — LIDOCAINE HYDROCHLORIDE 40 MG/ML
4 INJECTION, SOLUTION RETROBULBAR ONCE
Status: DISCONTINUED | OUTPATIENT
Start: 2022-09-28 | End: 2022-09-28

## 2022-09-28 RX ORDER — IPRATROPIUM BROMIDE AND ALBUTEROL SULFATE 2.5; .5 MG/3ML; MG/3ML
3 SOLUTION RESPIRATORY (INHALATION) EVERY 4 HOURS PRN
Status: DISCONTINUED | OUTPATIENT
Start: 2022-09-28 | End: 2022-10-06 | Stop reason: HOSPADM

## 2022-09-28 RX ORDER — POTASSIUM CHLORIDE 7.45 MG/ML
80 INJECTION INTRAVENOUS
Status: DISCONTINUED | OUTPATIENT
Start: 2022-09-28 | End: 2022-10-04

## 2022-09-28 RX ORDER — PANTOPRAZOLE SODIUM 40 MG/1
40 TABLET, DELAYED RELEASE ORAL DAILY
Status: DISCONTINUED | OUTPATIENT
Start: 2022-09-29 | End: 2022-09-29

## 2022-09-28 RX ORDER — NEOSTIGMINE METHYLSULFATE 0.5 MG/ML
INJECTION, SOLUTION INTRAVENOUS
Status: DISCONTINUED | OUTPATIENT
Start: 2022-09-28 | End: 2022-09-28

## 2022-09-28 RX ORDER — LIDOCAINE HYDROCHLORIDE 10 MG/ML
1 INJECTION, SOLUTION EPIDURAL; INFILTRATION; INTRACAUDAL; PERINEURAL ONCE
Status: DISCONTINUED | OUTPATIENT
Start: 2022-09-28 | End: 2022-09-28 | Stop reason: HOSPADM

## 2022-09-28 RX ORDER — PROPOFOL 10 MG/ML
VIAL (ML) INTRAVENOUS
Status: DISCONTINUED | OUTPATIENT
Start: 2022-09-28 | End: 2022-09-28

## 2022-09-28 RX ORDER — POTASSIUM CHLORIDE 7.45 MG/ML
40 INJECTION INTRAVENOUS
Status: DISCONTINUED | OUTPATIENT
Start: 2022-09-28 | End: 2022-10-04

## 2022-09-28 RX ORDER — ROCURONIUM BROMIDE 10 MG/ML
INJECTION, SOLUTION INTRAVENOUS
Status: DISCONTINUED | OUTPATIENT
Start: 2022-09-28 | End: 2022-09-28

## 2022-09-28 RX ORDER — GLUCAGON 1 MG
1 KIT INJECTION
Status: DISCONTINUED | OUTPATIENT
Start: 2022-09-28 | End: 2022-10-06 | Stop reason: HOSPADM

## 2022-09-28 RX ORDER — ONDANSETRON 2 MG/ML
4 INJECTION INTRAMUSCULAR; INTRAVENOUS EVERY 12 HOURS PRN
Status: DISCONTINUED | OUTPATIENT
Start: 2022-09-28 | End: 2022-10-06 | Stop reason: HOSPADM

## 2022-09-28 RX ORDER — LACTULOSE 10 G/15ML
20 SOLUTION ORAL EVERY 6 HOURS PRN
Status: DISCONTINUED | OUTPATIENT
Start: 2022-09-28 | End: 2022-10-06 | Stop reason: HOSPADM

## 2022-09-28 RX ORDER — ACETAMINOPHEN 325 MG/1
650 TABLET ORAL EVERY 6 HOURS PRN
Status: DISCONTINUED | OUTPATIENT
Start: 2022-09-28 | End: 2022-09-28

## 2022-09-28 RX ORDER — CLONIDINE HYDROCHLORIDE 0.1 MG/1
0.1 TABLET ORAL EVERY 6 HOURS PRN
Status: DISCONTINUED | OUTPATIENT
Start: 2022-09-28 | End: 2022-10-06 | Stop reason: HOSPADM

## 2022-09-28 RX ORDER — MIDAZOLAM HYDROCHLORIDE 1 MG/ML
INJECTION, SOLUTION INTRAMUSCULAR; INTRAVENOUS
Status: DISCONTINUED | OUTPATIENT
Start: 2022-09-28 | End: 2022-09-28

## 2022-09-28 RX ORDER — POTASSIUM CHLORIDE 7.45 MG/ML
60 INJECTION INTRAVENOUS
Status: DISCONTINUED | OUTPATIENT
Start: 2022-09-28 | End: 2022-10-04

## 2022-09-28 RX ORDER — BACITRACIN ZINC 500 UNIT/G
OINTMENT (GRAM) TOPICAL 3 TIMES DAILY
Status: DISCONTINUED | OUTPATIENT
Start: 2022-09-28 | End: 2022-10-06 | Stop reason: HOSPADM

## 2022-09-28 RX ORDER — LIDOCAINE HYDROCHLORIDE AND EPINEPHRINE 10; 10 MG/ML; UG/ML
INJECTION, SOLUTION INFILTRATION; PERINEURAL
Status: DISCONTINUED | OUTPATIENT
Start: 2022-09-28 | End: 2022-09-28 | Stop reason: HOSPADM

## 2022-09-28 RX ORDER — HEPARIN SODIUM 1000 [USP'U]/ML
INJECTION, SOLUTION INTRAVENOUS; SUBCUTANEOUS
Status: DISCONTINUED | OUTPATIENT
Start: 2022-09-28 | End: 2022-09-28 | Stop reason: HOSPADM

## 2022-09-28 RX ORDER — KETAMINE HCL IN 0.9 % NACL 50 MG/5 ML
SYRINGE (ML) INTRAVENOUS
Status: DISCONTINUED | OUTPATIENT
Start: 2022-09-28 | End: 2022-09-28

## 2022-09-28 RX ORDER — MAGNESIUM SULFATE HEPTAHYDRATE 40 MG/ML
2 INJECTION, SOLUTION INTRAVENOUS
Status: DISCONTINUED | OUTPATIENT
Start: 2022-09-28 | End: 2022-10-04

## 2022-09-28 RX ORDER — SODIUM CHLORIDE, SODIUM LACTATE, POTASSIUM CHLORIDE, CALCIUM CHLORIDE 600; 310; 30; 20 MG/100ML; MG/100ML; MG/100ML; MG/100ML
INJECTION, SOLUTION INTRAVENOUS CONTINUOUS
Status: DISCONTINUED | OUTPATIENT
Start: 2022-09-28 | End: 2022-09-30

## 2022-09-28 RX ORDER — SODIUM CHLORIDE 9 MG/ML
INJECTION, SOLUTION INTRAVENOUS CONTINUOUS
Status: DISCONTINUED | OUTPATIENT
Start: 2022-09-28 | End: 2022-09-28

## 2022-09-28 RX ORDER — ACETAMINOPHEN 10 MG/ML
INJECTION, SOLUTION INTRAVENOUS
Status: DISCONTINUED | OUTPATIENT
Start: 2022-09-28 | End: 2022-09-28

## 2022-09-28 RX ORDER — LIDOCAINE HYDROCHLORIDE 20 MG/ML
5 INJECTION, SOLUTION EPIDURAL; INFILTRATION; INTRACAUDAL; PERINEURAL ONCE
Status: COMPLETED | OUTPATIENT
Start: 2022-09-28 | End: 2022-09-28

## 2022-09-28 RX ORDER — SODIUM CHLORIDE 0.9 % (FLUSH) 0.9 %
10 SYRINGE (ML) INJECTION
Status: DISCONTINUED | OUTPATIENT
Start: 2022-09-28 | End: 2022-10-06 | Stop reason: HOSPADM

## 2022-09-28 RX ORDER — MUPIROCIN 20 MG/G
1 OINTMENT TOPICAL 2 TIMES DAILY
Status: DISPENSED | OUTPATIENT
Start: 2022-09-28 | End: 2022-10-03

## 2022-09-28 RX ORDER — HYDROMORPHONE HYDROCHLORIDE 1 MG/ML
1 INJECTION, SOLUTION INTRAMUSCULAR; INTRAVENOUS; SUBCUTANEOUS EVERY 4 HOURS PRN
Status: DISCONTINUED | OUTPATIENT
Start: 2022-09-28 | End: 2022-09-30

## 2022-09-28 RX ORDER — PROCHLORPERAZINE EDISYLATE 5 MG/ML
5 INJECTION INTRAMUSCULAR; INTRAVENOUS EVERY 6 HOURS PRN
Status: DISCONTINUED | OUTPATIENT
Start: 2022-09-28 | End: 2022-10-06 | Stop reason: HOSPADM

## 2022-09-28 RX ORDER — EPHEDRINE SULFATE 50 MG/ML
INJECTION, SOLUTION INTRAVENOUS
Status: DISCONTINUED | OUTPATIENT
Start: 2022-09-28 | End: 2022-09-28

## 2022-09-28 RX ORDER — CLINDAMYCIN PHOSPHATE 900 MG/50ML
900 INJECTION, SOLUTION INTRAVENOUS
Status: DISCONTINUED | OUTPATIENT
Start: 2022-09-28 | End: 2022-09-28

## 2022-09-28 RX ORDER — INSULIN ASPART 100 [IU]/ML
0-5 INJECTION, SOLUTION INTRAVENOUS; SUBCUTANEOUS EVERY 6 HOURS PRN
Status: DISCONTINUED | OUTPATIENT
Start: 2022-09-28 | End: 2022-10-06 | Stop reason: HOSPADM

## 2022-09-28 RX ORDER — DEXMEDETOMIDINE HYDROCHLORIDE 100 UG/ML
INJECTION, SOLUTION INTRAVENOUS
Status: DISCONTINUED | OUTPATIENT
Start: 2022-09-28 | End: 2022-09-28

## 2022-09-28 RX ORDER — CALCIUM GLUCONATE 20 MG/ML
2 INJECTION, SOLUTION INTRAVENOUS
Status: DISCONTINUED | OUTPATIENT
Start: 2022-09-28 | End: 2022-10-04

## 2022-09-28 RX ORDER — PHENYLEPHRINE HCL IN 0.9% NACL 1 MG/10 ML
SYRINGE (ML) INTRAVENOUS
Status: DISCONTINUED | OUTPATIENT
Start: 2022-09-28 | End: 2022-09-28

## 2022-09-28 RX ORDER — FENTANYL CITRATE 50 UG/ML
INJECTION, SOLUTION INTRAMUSCULAR; INTRAVENOUS
Status: DISCONTINUED | OUTPATIENT
Start: 2022-09-28 | End: 2022-09-28

## 2022-09-28 RX ORDER — HYDROCODONE BITARTRATE AND ACETAMINOPHEN 7.5; 325 MG/15ML; MG/15ML
15 SOLUTION ORAL EVERY 4 HOURS PRN
Status: DISCONTINUED | OUTPATIENT
Start: 2022-09-28 | End: 2022-09-30

## 2022-09-28 RX ORDER — CALCIUM GLUCONATE 20 MG/ML
3 INJECTION, SOLUTION INTRAVENOUS
Status: DISCONTINUED | OUTPATIENT
Start: 2022-09-28 | End: 2022-10-04

## 2022-09-28 RX ORDER — HYDROMORPHONE HYDROCHLORIDE 1 MG/ML
INJECTION, SOLUTION INTRAMUSCULAR; INTRAVENOUS; SUBCUTANEOUS
Status: DISCONTINUED | OUTPATIENT
Start: 2022-09-28 | End: 2022-09-28

## 2022-09-28 RX ADMIN — DEXMEDETOMIDINE HYDROCHLORIDE 16 MCG: 100 INJECTION, SOLUTION INTRAVENOUS at 09:09

## 2022-09-28 RX ADMIN — PROPOFOL 30 MG: 10 INJECTION, EMULSION INTRAVENOUS at 10:09

## 2022-09-28 RX ADMIN — LIDOCAINE HYDROCHLORIDE 100 MG: 20 INJECTION, SOLUTION EPIDURAL; INFILTRATION; INTRACAUDAL; PERINEURAL at 08:09

## 2022-09-28 RX ADMIN — PROPOFOL 200 MG: 10 INJECTION, EMULSION INTRAVENOUS at 09:09

## 2022-09-28 RX ADMIN — BACITRACIN: 500 OINTMENT TOPICAL at 08:09

## 2022-09-28 RX ADMIN — Medication 15 MG: at 10:09

## 2022-09-28 RX ADMIN — FENTANYL CITRATE 50 MCG: 0.05 INJECTION, SOLUTION INTRAMUSCULAR; INTRAVENOUS at 09:09

## 2022-09-28 RX ADMIN — HYDROCODONE BITARTRATE AND ACETAMINOPHEN 15 ML: 7.5; 325 SOLUTION ORAL at 06:09

## 2022-09-28 RX ADMIN — AMPICILLIN AND SULBACTAM 3 G: 2; 1 INJECTION, POWDER, FOR SOLUTION INTRAMUSCULAR; INTRAVENOUS at 09:09

## 2022-09-28 RX ADMIN — DEXAMETHASONE SODIUM PHOSPHATE 4 MG: 4 INJECTION INTRA-ARTICULAR; INTRALESIONAL; INTRAMUSCULAR; INTRAVENOUS; SOFT TISSUE at 09:09

## 2022-09-28 RX ADMIN — Medication 100 MCG: at 12:09

## 2022-09-28 RX ADMIN — SODIUM CHLORIDE, SODIUM LACTATE, POTASSIUM CHLORIDE, AND CALCIUM CHLORIDE 500 ML: .6; .31; .03; .02 INJECTION, SOLUTION INTRAVENOUS at 11:09

## 2022-09-28 RX ADMIN — MIDAZOLAM 2 MG: 1 INJECTION INTRAMUSCULAR; INTRAVENOUS at 09:09

## 2022-09-28 RX ADMIN — Medication 10 MG: at 12:09

## 2022-09-28 RX ADMIN — SODIUM CHLORIDE, SODIUM LACTATE, POTASSIUM CHLORIDE, AND CALCIUM CHLORIDE: .6; .31; .03; .02 INJECTION, SOLUTION INTRAVENOUS at 03:09

## 2022-09-28 RX ADMIN — GLYCOPYRROLATE 0.2 MG: 0.2 INJECTION INTRAMUSCULAR; INTRAVENOUS at 11:09

## 2022-09-28 RX ADMIN — ACETAMINOPHEN 1000 MG: 10 INJECTION INTRAVENOUS at 01:09

## 2022-09-28 RX ADMIN — SODIUM CHLORIDE, SODIUM GLUCONATE, SODIUM ACETATE, POTASSIUM CHLORIDE, MAGNESIUM CHLORIDE, SODIUM PHOSPHATE, DIBASIC, AND POTASSIUM PHOSPHATE: .53; .5; .37; .037; .03; .012; .00082 INJECTION, SOLUTION INTRAVENOUS at 12:09

## 2022-09-28 RX ADMIN — Medication 100 MCG: at 10:09

## 2022-09-28 RX ADMIN — ACETAMINOPHEN 650 MG: 325 TABLET ORAL at 06:09

## 2022-09-28 RX ADMIN — AMPICILLIN AND SULBACTAM 3 G: 2; 1 INJECTION, POWDER, FOR SOLUTION INTRAMUSCULAR; INTRAVENOUS at 12:09

## 2022-09-28 RX ADMIN — PROPOFOL 20 MG: 10 INJECTION, EMULSION INTRAVENOUS at 10:09

## 2022-09-28 RX ADMIN — MUPIROCIN 1 G: 20 OINTMENT TOPICAL at 08:09

## 2022-09-28 RX ADMIN — HYDROMORPHONE HYDROCHLORIDE 0.2 MG: 1 INJECTION, SOLUTION INTRAMUSCULAR; INTRAVENOUS; SUBCUTANEOUS at 12:09

## 2022-09-28 RX ADMIN — SODIUM CHLORIDE, SODIUM GLUCONATE, SODIUM ACETATE, POTASSIUM CHLORIDE, MAGNESIUM CHLORIDE, SODIUM PHOSPHATE, DIBASIC, AND POTASSIUM PHOSPHATE: .53; .5; .37; .037; .03; .012; .00082 INJECTION, SOLUTION INTRAVENOUS at 09:09

## 2022-09-28 RX ADMIN — GLYCOPYRROLATE 0.6 MG: 0.2 INJECTION INTRAMUSCULAR; INTRAVENOUS at 01:09

## 2022-09-28 RX ADMIN — EPHEDRINE SULFATE 5 MG: 50 INJECTION INTRAVENOUS at 11:09

## 2022-09-28 RX ADMIN — GLYCOPYRROLATE 0.2 MG: 0.2 INJECTION INTRAMUSCULAR; INTRAVENOUS at 07:09

## 2022-09-28 RX ADMIN — HYDROMORPHONE HYDROCHLORIDE 1 MG: 1 INJECTION, SOLUTION INTRAMUSCULAR; INTRAVENOUS; SUBCUTANEOUS at 03:09

## 2022-09-28 RX ADMIN — Medication 100 MCG: at 11:09

## 2022-09-28 RX ADMIN — HYDROMORPHONE HYDROCHLORIDE 0.2 MG: 1 INJECTION, SOLUTION INTRAMUSCULAR; INTRAVENOUS; SUBCUTANEOUS at 11:09

## 2022-09-28 RX ADMIN — SODIUM CHLORIDE: 9 INJECTION, SOLUTION INTRAVENOUS at 08:09

## 2022-09-28 RX ADMIN — NEOSTIGMINE METHYLSULFATE 4 MG: 0.5 INJECTION, SOLUTION INTRAVENOUS at 01:09

## 2022-09-28 RX ADMIN — BACITRACIN: 500 OINTMENT TOPICAL at 03:09

## 2022-09-28 RX ADMIN — ROCURONIUM BROMIDE 10 MG: 50 INJECTION INTRAVENOUS at 11:09

## 2022-09-28 RX ADMIN — EPHEDRINE SULFATE 15 MG: 50 INJECTION INTRAVENOUS at 10:09

## 2022-09-28 RX ADMIN — ROCURONIUM BROMIDE 10 MG: 50 INJECTION INTRAVENOUS at 10:09

## 2022-09-28 RX ADMIN — Medication 25 MG: at 09:09

## 2022-09-28 RX ADMIN — ROCURONIUM BROMIDE 30 MG: 50 INJECTION INTRAVENOUS at 09:09

## 2022-09-28 RX ADMIN — SODIUM CHLORIDE 0.25 MCG/KG/MIN: 9 INJECTION, SOLUTION INTRAVENOUS at 12:09

## 2022-09-28 NOTE — H&P
[facility] - [facility] Intensive Care  Critical Care - Surgery  History & Physical    Patient Name: Jennifer Andre  MRN: [id number]  Admission Date: 9/28/2022  Code Status: Full Code  Attending Physician: Wallace Santiago MD   Primary Care Provider: Marcello Benaviedz MD   Principal Problem: <principal problem not specified>    Subjective:     HPI: Jennifer Andre is a 67 y.o. 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 She recently re-presented with evidence of a new lesion in the right lateral floor of mouth/buccal mucosa/skin paddle for fibular free flap.    Hospital/ICU Course: Patient admitted to ICU 9/28 s/p revision tracheostomy, right partial glossectomy with resection of floor of mouth and right buccal mucosa, and right soft palate with pectoral flap, not requiring checks. Patient arrived to ICU on vent but transitioned to trach collar.    Follow-up For: Procedure(s) (LRB):  EXCISION, NEOPLASM, MOUTH (Right)  TRACHEOTOMY (N/A)  CREATION, FREE FLAP (Right)  INSERTION, PEG TUBE (N/A)    Post-Operative Day: Day of Surgery     Past Medical History:   Diagnosis Date    Cancer        Past Surgical History:   Procedure Laterality Date    APPENDECTOMY      CHOLECYSTECTOMY      DISSECTION OF NECK Right 5/30/2019    Procedure: DISSECTION, NECK;  Surgeon: Wallace Santiago MD;  Location: [address];  Service: ENT;  Laterality: Right;    ESOPHAGOGASTRODUODENOSCOPY W/ PEG  5/30/2019    Procedure: EGD, WITH PEG TUBE INSERTION;  Surgeon: Ottoniel Jimenez MD;  Location: [address];  Service: General;;    FLAP PROCEDURE Right 5/30/2019    Procedure: CREATION, FREE FLAP;  Surgeon: Navdeep Dangelo MD;  Location: [address];  Service: Plastics;  Laterality: Right;  ischemia time 0611-7284  flap rt lower leg to rt neck    HYSTERECTOMY      MANDIBLE SURGERY      TRACHEOTOMY N/A 5/30/2019    Procedure: TRACHEOTOMY;  Surgeon: Navdeep Dangelo MD;  Location: [address];   Service: Plastics;  Laterality: N/A;       Review of patient's allergies indicates:   Allergen Reactions    Bactrim [sulfamethoxazole-trimethoprim]     Clindamycin     Keflex [cephalexin]      Tolerated Unasyn 05/31/2019    Tramadol      DIZZY AND NAUSEA, & VOMITTING       Family History    None       Tobacco Use    Smoking status: Former    Smokeless tobacco: Never   Substance and Sexual Activity    Alcohol use: Never    Drug use: Never    Sexual activity: Not on file      Review of Systems   Unable to perform ROS: Acuity of condition   Objective:     Vital Signs (Most Recent):  Temp: 98.3 °F (36.8 °C) (09/28/22 0643)  Pulse: 60 (09/28/22 1458)  Resp: 16 (09/28/22 1514)  BP: (!) 107/53 (09/28/22 1458)  SpO2: 97 % (09/28/22 1458)   Vital Signs (24h Range):  Temp:  [98.3 °F (36.8 °C)] 98.3 °F (36.8 °C)  Pulse:  [53-89] 60  Resp:  [0-38] 16  SpO2:  [97 %-100 %] 97 %  BP: (107-119)/(53-57) 107/53     Weight: 57.6 kg (127 lb)  Body mass index is 22.5 kg/m².      Intake/Output Summary (Last 24 hours) at 9/28/2022 1520  Last data filed at 9/28/2022 1436  Gross per 24 hour   Intake 3650 ml   Output 685 ml   Net 2965 ml       Physical Exam  Constitutional:       Comments: Sedated post-op   HENT:      Head:      Comments: Changes noted consistent with prior left mandibular surgery  Eyes:      Pupils: Pupils are equal, round, and reactive to light.   Neck:      Comments: Neck drain in place  Staple line CDI, bulge noted secondary to pec flap  Tracheostomy site noted  Cardiovascular:      Rate and Rhythm: Normal rate and regular rhythm.      Pulses: Normal pulses.   Pulmonary:      Effort: Pulmonary effort is normal.      Breath sounds: Normal breath sounds.   Abdominal:      General: Abdomen is flat. Bowel sounds are normal.      Palpations: Abdomen is soft.      Comments: PEG tube in place with bilious drainage   Musculoskeletal:      Comments: Left chest staple line CDI   Skin:     Comments: 2 chest drains in place        Vents:  Oxygen Concentration (%): 28 (09/28/22 1439)    Lines/Drains/Airways       Drain  Duration                  Gastrostomy/Enterostomy 05/30/19 1140 Percutaneous endoscopic gastrostomy (PEG) LUQ feeding 1217 days         Closed/Suction Drain 09/28/22 1227 Right Chest Bulb 15 Fr. <1 day         Closed/Suction Drain 09/28/22 1228 Right Chest Bulb 15 Fr. <1 day         Closed/Suction Drain 09/28/22 1320 Right Neck Bulb 10 Fr. <1 day         Gastrostomy/Enterostomy 09/28/22 0944 Gastrostomy tube w/ balloon midline feeding <1 day         Urethral Catheter 09/28/22 0915 Temperature probe;Non-latex 14 Fr. <1 day              Airway  Duration                  Surgical Airway 06/06/19 0800 Shiley Uncuffed 1210 days         Airway - Non-Surgical 09/28/22 0917 <1 day         Surgical Airway 09/28/22 1302 Shiley Cuffed <1 day              Arterial Line  Duration             Arterial Line 09/28/22 1343 Right Radial <1 day              Peripheral Intravenous Line  Duration                  Peripheral IV - Single Lumen 20 G Left;Posterior Wrist -- days         Peripheral IV - Single Lumen 09/28/22 0725 20 G Right Forearm <1 day         Peripheral IV - Single Lumen 09/28/22 1118 18 G Right Ankle <1 day                    Significant Labs:    CBC/Anemia Profile:  Recent Labs   Lab 09/27/22  1346   WBC 8.18   HGB 13.2   HCT 40.9      MCV 95   RDW 12.9        Chemistries:  Recent Labs   Lab 09/27/22  1346      K 4.3      CO2 25   BUN 8   CREATININE 0.8   CALCIUM 9.6       All pertinent labs within the past 24 hours have been reviewed.    Significant Imaging: I have reviewed all pertinent imaging results/findings within the past 24 hours.    Assessment/Plan:     There are no hospital problems to display for this patient.        Neuro/Psych:   -- Sedation: None  -- Pain: Tylenol, hydrocodone, dilaudid PRN             Cards:   -- HDS  -- CTM      Pulm:   -- Goal O2 sat > 90%  -- On trach collar s/p recent  trach  -- Wean as able      Renal:  -- Keep pierson for strict I/O  -- BUN/Cr 7/0.6      FEN / GI:   -- Net 2,965  -- Replace lytes as needed  -- Nutrition: NPO  -- G tube ok for meds  -- Tube feeds 9/29      ID:   -- Tm: afebrile; WBC 12.2  -- Abx None indicated      Heme/Onc:   -- H/H stable 11.0/33.6  -- Daily CBC      Endo:   -- Gluc goal 140-180  -- SSI      PPx:   Feeding: NPO  Analgesia/Sedation: As above  Thromboembolic prevention: Held in acute setting  HOB >30: Yes  Stress Ulcer ppx: Pantoprazole (home med)  Glucose control: Critical care goal 140-180 g/dl, ISS    Lines/Drains/Airway: A-line, 3 PIV, PEG, G-tube, pierson, 2 chest drains, 1 neck drain, trach      Dispo/Code Status/Palliative:   -- SICU / Full Code       Critical care was time spent personally by me on the following activities: development of treatment plan with patient or surrogate and bedside caregivers, discussions with consultants, evaluation of patient's response to treatment, examination of patient, ordering and performing treatments and interventions, ordering and review of laboratory studies, ordering and review of radiographic studies, pulse oximetry, re-evaluation of patient's condition.  This critical care time did not overlap with that of any other provider or involve time for any procedures.     Chris Ngo, DO  Critical Care - Surgery  [facility] - Surgical Intensive [facility]

## 2022-09-28 NOTE — TRANSFER OF CARE
"Anesthesia Transfer of Care Note    Patient: Jennifer Andre    Procedure(s) Performed: Procedure(s) (LRB):  EXCISION, NEOPLASM, MOUTH (Right)  TRACHEOTOMY (N/A)  CREATION, FREE FLAP (Right)  INSERTION, PEG TUBE (N/A)    Patient location: ICU    Anesthesia Type: general    Transport from OR: Transported from OR on 6-10 L/min O2 by face mask with adequate spontaneous ventilation    Post pain: adequate analgesia    Post assessment: no apparent anesthetic complications    Post vital signs: stable    Level of consciousness: awake and alert    Nausea/Vomiting: no nausea/vomiting    Complications: none    Transfer of care protocol was followed      Last vitals:   Visit Vitals  BP (!) 119/57   Pulse (!) 53   Temp 36.8 °C (98.3 °F) (Oral)   Resp 18   Ht 5' 3" (1.6 m)   Wt 57.6 kg (127 lb)   Breastfeeding No   BMI 22.50 kg/m²     "

## 2022-09-28 NOTE — RESPIRATORY THERAPY
Patient arrived from OR on AMBU ventilation.  Placed patient on Trach Collar 5L/28%.  Trach Collar is secured with trach ties.  Will continue to monitor.

## 2022-09-28 NOTE — BRIEF OP NOTE
[facility] ([facility])  Brief Operative Note     SUMMARY     Surgery Date:   9/28/2022     SURGEON(S):   Surgeon(s) and Role:  Panel 1:     * Wallace Santiago MD - Primary     * Jim Fleming MD - Resident - Assisting  Panel 2:     * Milly Price MD - Primary     * Iasel Quach MD  Panel 3:     * Emmanuel Cuadra MD - Primary    ANESTHESIA STAFF:   Lucy Cortez MD    OR STAFF:   Circulator: Yaakov Graves RN; Jorge A Pierre RN  Relief Circulator: Hui Hudson RN  Relief Scrub: ST Mindi  Scrub Person: Ladonna Da Silva; ST Kiley      Pre-op Diagnosis:  Malignant neoplasm of oral cavity [C06.9]    Post-op Diagnosis:  Post-Op Diagnosis Codes:     * Malignant neoplasm of oral cavity [C06.9]    Procedure(s) (LRB):  EXCISION, NEOPLASM, MOUTH (Right)  TRACHEOTOMY (N/A)  CREATION, FREE FLAP (Right)  INSERTION, PEG TUBE (N/A)    Anesthesia: General/MAC    Description of the procedure: composite resection of oral tongue and floor of mouth reconstructed with pec flap, trach and peg    Findings: see full op note    Estimated Blood Loss: 100cc         Specimens:   Specimen (24h ago, onward)       Start     Ordered    09/28/22 1325  Specimen to Pathology, Surgery ENT  Once        Comments: Pre-op Diagnosis: Malignant neoplasm of oral cavity [C06.9]Procedure(s):EXCISION, NEOPLASM, MOUTHTRACHEOTOMYDISSECTION, NECKCREATION, FREE FLAPINSERTION, PEG TUBE Number of specimens: 3Name of specimens: 1. Level 1a Lymph Node - permanent2. Right Level 1b Lymph Node - permanent3. RIGHT PARTIAL GLOSSECTOMY, SUBMUCUOSA AND PHARYNX - FS/PERM     References:    Click here for ordering Quick Tip   Question Answer Comment   Procedure Type: ENT    Which provider would you like to cc? WALLACE SANTIAGO    Release to patient Immediate        09/28/22 1326                    LENGTH OF SURGERY:   * Missing case tracking time(s) *    Event Time In   In Facility 0557   In Pre-Procedure 0629    Physician Available    Anesthesia Available    Pre-Op: Bedside Procedure Start    Pre-Op: Bedside Procedure Stop    Pre-Procedure Complete 0724   Out of Pre-Procedure    Holding Start    Holding Stop    Anesthesia Start 0852   Anesthesia Start Data Collection    Setup Start 0722   Setup Complete 0754   In Room 0851   Prep Start    Procedure Prep Complete 0931   Procedure Start 0936   Procedure Closing 1348   Emergence    Procedure Finish    Out of Room    In OR Recovery    Out of OR Recovery    Cleanup Start    Cleanup Complete    Cosmetic Start    Cosmetic Stop    Pain Mgmt In Room    Pain Mgmt Out Room    In Recovery    Anesthesia Finish    Bedside Procedure Start    Bedside Procedure Stop    Recovery Care Complete    Out of Recovery    In Diagnostic Recovery    Out Diagnostic Recovery    In PACU Phase II    Out PACU Phase II    In PACU Ext    Out PACU Ext    In Recovery DOSC    Out Recovery DOSC    Obs Rec Start    Obs Rec Stop    To Phase II    In Phase II    Phase II Care Complete    Out of Phase II    In Phase II Ext    Out Phase II Ext    Procedural Care Complete    Pain Follow Up Needed    Pain Follow Up Complete    PACU Bed Request

## 2022-09-28 NOTE — ANESTHESIA PROCEDURE NOTES
Intubation    Date/Time: 9/28/2022 9:17 AM  Performed by: James Beck MD  Authorized by: Lucy Cortez MD     Intubation:     Induction:  Intravenous    Intubated:  Postinduction (View initially obtained with versed, precedex, and ketamine.)    Mask Ventilation:  Easy mask    Attempts:  1    Attempted By:  Resident anesthesiologist    Method of Intubation:  Video laryngoscopy    Blade:  Alexander 3    Laryngeal View Grade: Grade IIA - cords partially seen      Difficult Airway Encountered?: No      Complications:  None    Airway Device:  Oral endotracheal tube    Airway Device Size:  7.0    Style/Cuff Inflation:  Cuffed (inflated to minimal occlusive pressure)    Tube secured:  22    Secured at:  The teeth    Placement Verified By:  Capnometry and Revisualization with laryngoscopy    Complicating Factors:  Anterior larynx and retrognathia (Patient has had multiple airway surgeries and radiation.)    Findings Post-Intubation:  BS equal bilateral and atraumatic/condition of teeth unchanged  Notes:      Patient has history of radiation to her airway and multiple airway procedures including Tracheotomy and Trach reversal. Patient edentulous with altered airway anatomy. Larynx located anteriorly. Patient given versed, precedex, and ketamine to tolerate Alexander inspection and visualization of airway. LTA used, patient then induced with propofol and given succinylcholine. Bougie passed through vocal cords and 7.0 ETT advanced over bougie.

## 2022-09-28 NOTE — OP NOTE
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 Revision tracheostomy   2. Right partial glossectomy with resection of floor of mouth/buccal mucosa/mandibular gingiva/tongue base and right soft palate    ANESTHESIA: General/MAC      INDICATIONS FOR PROCEDURE:   Jennifer Andre is a 67 y.o. 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  She recently re-presented with evidence of a new lesion in the right lateral floor of mouth/buccal mucosa/skin paddle for fibular free flap.  Given these findings, I recommended that we return to the operating room for resection and reconstruction either utilizing a free flap or a pectoralis major myocutaneous flap.    She was apprised of the risks, benefits and alternatives to surgery.  In spite of the risk inherent to surgery,she provided informed consent for the aforementioned procedures.     PROCEDURE IN DETAIL:  The patient was taken to the operating room and placed on the operating table in the supine position.  General endotracheal anesthesia was induced by the anesthesia team.     The operating table was rotated wondered 80°.  The old trach site was marked.  The old right-sided neck scar was also marked extended across the midline for access to the mandible and oral cavity.  The intended incisions were injected with several cc of 1% lidocaine with epinephrine.  The right face, neck and chest were then prepped and draped in standard sterile fashion.      To begin, the tracheostomy was performed.  The old trach site was incised  utilizing the Bovie on cutting current.  Sharp dissection proceeded through the underlying subcutaneous tissue to identify the strap musculature which was scarred to the underlying trachea.  The trachea was then exposed and entered at the old trach site.  The tracheostomy was widened utilizing a trach .  The endotracheal tube was withdrawn and a size 6.5 reinforced endotracheal tube was placed into the airway.  It was attached to the anesthesia circuit.  Placement in the airway was confirmed with return of end-tidal CO2.      Next, the neck incision was carried out utilizing the Bovie.  Sharp dissection proceeded through the underlying subcutaneous tissue and platysma.  Subplatysmal flaps were then elevated over the mandible.  The entirety of the reconstruction plate was exposed.  Next, our attention was turned intraorally.  The tumor in question was identified.  A 2 cm margin was marked circumferentially.  The mucosal incisions were then carried out through the oral tongue left floor of mouth anterior mandibular gingiva extending onto the buccal surface of the mandible incising the lower lip mucosa.  The periosteum was elevated off the native mandible and kaley mandible.  The periosteum elevated easily.  There was no evidence of bony involvement of the tumor.  Dissection then proceeded posteriorly dividing the soft palate and posterior pharyngeal wall.  The specimen was then delivered into the neck where the final cuts were made.  The hypoglossal nerve and lingual artery on the right side were clamped, divided and suture ligated.  Ultimately, frozen sections were taken from the floor of mouth and buccal mucosa margin were found to be negative for carcinoma.  All other margins were widely clear of tumor.  Hemostasis was achieved at the surgical site with electrocautery.  The patient was then handed over to Dr. Price for the reconstructive portion of the procedure.  Please see his note for details.    There were  no intraoperative complications.  I was present for and participated in the entire procedure as dictated above.       ESTIMATED BLOOD LOSS: 25 mL    SPECIMENS:   Specimen (24h ago, onward)       Start     Ordered    09/28/22 1325  Specimen to Pathology, Surgery ENT  Once        Comments: Pre-op Diagnosis: Malignant neoplasm of oral cavity [C06.9]Procedure(s):EXCISION, NEOPLASM, MOUTHTRACHEOTOMYDISSECTION, NECKCREATION, FREE FLAPINSERTION, PEG TUBE Number of specimens: 3Name of specimens: 1. Level 1a Lymph Node - permanent2. Right Level 1b Lymph Node - permanent3. RIGHT PARTIAL GLOSSECTOMY, SUBMUCUOSA AND PHARYNX - FS/PERM     References:    Click here for ordering Quick Tip   Question Answer Comment   Procedure Type: ENT    Which provider would you like to cc? MAHAD JIMENEZ    Release to patient Immediate        09/28/22 1827

## 2022-09-28 NOTE — ANESTHESIA PROCEDURE NOTES
Right Radial arterial line    Diagnosis: Malignant neoplasm of oral cavity    Patient location during procedure: done in OR    Staffing  Authorizing Provider: Lucy Cortez MD  Performing Provider: James Beck MD    Anesthesiologist was present at the time of the procedure.    Preanesthetic Checklist  Completed: patient identified, IV checked, site marked, risks and benefits discussed, surgical consent, monitors and equipment checked, pre-op evaluation, timeout performed and anesthesia consent givenRight Radial arterial line  Skin Prep: chlorhexidine gluconate and isopropyl alcohol  Orientation: right  Location: radial    Catheter Size: 20 G  Catheter placement by Anatomical landmarks. Heme positive aspiration all ports. Insertion Attempts: 1  Assessment  Dressing: secured with tape and tegaderm

## 2022-09-28 NOTE — ANESTHESIA POSTPROCEDURE EVALUATION
Anesthesia Post Evaluation    Patient: Jennifer Ander    Procedure(s) Performed: Procedure(s) (LRB):  EXCISION, NEOPLASM, MOUTH (Right)  TRACHEOTOMY (N/A)  CREATION, FREE FLAP (Right)  INSERTION, PEG TUBE (N/A)    Final Anesthesia Type: general      Patient location during evaluation: ICU  Patient participation: Yes- Able to Participate  Level of consciousness: awake  Post-procedure vital signs: reviewed and stable  Pain management: adequate  Airway patency: patent    PONV status at discharge: No PONV  Anesthetic complications: no      Cardiovascular status: blood pressure returned to baseline  Respiratory status: Tracheostomy  Hydration status: euvolemic  Follow-up not needed.          Vitals Value Taken Time   /50 09/28/22 1450   Temp 36 09/28/22 1450   Pulse 69 09/28/22 1450   Resp 8 09/28/22 1450   SpO2 100 % 09/28/22 1449   Vitals shown include unvalidated device data.      No case tracking events are documented in the log.      Pain/Timoteo Score: No data recorded

## 2022-09-28 NOTE — OP NOTE
DATE OF PROCEDURE: 09/28/2022.    PREOPERATIVE DIAGNOSES:   1. Recurrent oral cancer.  2. Dysphagia.    POSTOPERATIVE DIAGNOSES:   1. Recurrent oral cancer.  2. Dysphagia.     PROCEDURES PERFORMED:   1. Esophagogastroduodenoscopy.  2. Percutaneous endoscopic gastrostomy.    ATTENDING SURGEON: Emmanuel Cuadra MD.     RESIDENT:  Ottoniel Downs MD.    ANESTHESIA:  General.    INDICATION: Jennifer Andre is a 67 y.o.female referred to [facility] for surgery for recurrent oral cancer. The patient is expected to have prolonged dysphagia as a result and General Surgery was consulted for placement of a percutaneous gastrostomy tube. We did obtain informed consent from the patient who expressed understanding of the risks and benefits and gave consent to proceed.     PROCEDURE IN DETAIL: The patient was taken to the operating room and placed supine. After induction of general endotracheal tube anesthesia, the abdomen was prepped and draped in the standard fashion. An upper endoscope was introduced into the oropharynx and guided down into the esophagus and stomach. The stomach was insufflated with air. We intubated the duodenum and examined the first and second portions with no abnormalities noted. The visualized antrum and fundus demonstrated no major abnormalities. We withdrew the endoscope into the stomach and identified an appropriate position for gastrostomy tube placement 2 finger-breadths below the left subcostal margin. Palpation of the anterior abdominal wall at this point was visualized endoscopically and transillumination from the endoscope was visualized through the anterior abdominal wall. We made a small skin incision and the stomach was cannulated with an angtiocath. A guidewire was placed through the catheter and was grasped with an endoscopic snare. The endoscope, snare, and guidewire were all withdrawn from the patient's mouth. A 20-Romansh gastrostomy tube was loaded onto the guidewire and pulled  through the anterior abdominal wall via Seldinger technique. Repeat endoscopy was performed with the gastrostomy tube at the 3 cm hua at the skin. There was no blanching of the gastric mucosa, and when the tube was twisted, the button did not grab the mucosa. The insufflation in the stomach was evacuated, and the endoscope was removed. Esophagus was visualized on withdrawal and demonstrated no major abnormalities. The gastrostomy tube was secured in place using the supplied devices and connected to a bag for gravity drainage.  The case was then turned over to the ENT team. All needle and sponge counts were correct at the conclusion of my portion of the procedure. I was for the procedure in its entirety.    ESTIMATED BLOOD LOSS: Less than 5 mL.     FINDINGS: 20-Faroese percutaneous gastrostomy @ 3 cm placed without apparent complication.

## 2022-09-29 PROBLEM — S21.101A OPEN WOUND OF RIGHT CHEST WALL: Status: ACTIVE | Noted: 2022-09-29

## 2022-09-29 PROBLEM — S01.502A OPEN WOUND OF MOUTH: Status: ACTIVE | Noted: 2022-09-29

## 2022-09-29 PROBLEM — S11.90XA: Status: ACTIVE | Noted: 2022-09-29

## 2022-09-29 LAB
ANION GAP SERPL CALC-SCNC: 5 MMOL/L (ref 8–16)
BUN SERPL-MCNC: 9 MG/DL (ref 8–23)
CALCIUM SERPL-MCNC: 7.8 MG/DL (ref 8.7–10.5)
CHLORIDE SERPL-SCNC: 111 MMOL/L (ref 95–110)
CO2 SERPL-SCNC: 20 MMOL/L (ref 23–29)
CREAT SERPL-MCNC: 0.6 MG/DL (ref 0.5–1.4)
ERYTHROCYTE [DISTWIDTH] IN BLOOD BY AUTOMATED COUNT: 12.8 % (ref 11.5–14.5)
EST. GFR  (NO RACE VARIABLE): >60 ML/MIN/1.73 M^2
GLUCOSE SERPL-MCNC: 119 MG/DL (ref 70–110)
HCT VFR BLD AUTO: 29.5 % (ref 37–48.5)
HGB BLD-MCNC: 9.4 G/DL (ref 12–16)
MAGNESIUM SERPL-MCNC: 1.7 MG/DL (ref 1.6–2.6)
MCH RBC QN AUTO: 30.9 PG (ref 27–31)
MCHC RBC AUTO-ENTMCNC: 31.9 G/DL (ref 32–36)
MCV RBC AUTO: 97 FL (ref 82–98)
PHOSPHATE SERPL-MCNC: 2.9 MG/DL (ref 2.7–4.5)
PLATELET # BLD AUTO: 148 K/UL (ref 150–450)
PMV BLD AUTO: 10.6 FL (ref 9.2–12.9)
POCT GLUCOSE: 114 MG/DL (ref 70–110)
POCT GLUCOSE: 115 MG/DL (ref 70–110)
POCT GLUCOSE: 99 MG/DL (ref 70–110)
POTASSIUM SERPL-SCNC: 4.1 MMOL/L (ref 3.5–5.1)
RBC # BLD AUTO: 3.04 M/UL (ref 4–5.4)
SODIUM SERPL-SCNC: 136 MMOL/L (ref 136–145)
WBC # BLD AUTO: 11.69 K/UL (ref 3.9–12.7)

## 2022-09-29 PROCEDURE — 84100 ASSAY OF PHOSPHORUS: CPT | Performed by: STUDENT IN AN ORGANIZED HEALTH CARE EDUCATION/TRAINING PROGRAM

## 2022-09-29 PROCEDURE — 97535 SELF CARE MNGMENT TRAINING: CPT

## 2022-09-29 PROCEDURE — 83735 ASSAY OF MAGNESIUM: CPT | Performed by: STUDENT IN AN ORGANIZED HEALTH CARE EDUCATION/TRAINING PROGRAM

## 2022-09-29 PROCEDURE — 94761 N-INVAS EAR/PLS OXIMETRY MLT: CPT

## 2022-09-29 PROCEDURE — 63600175 PHARM REV CODE 636 W HCPCS: Performed by: STUDENT IN AN ORGANIZED HEALTH CARE EDUCATION/TRAINING PROGRAM

## 2022-09-29 PROCEDURE — 25000003 PHARM REV CODE 250: Performed by: STUDENT IN AN ORGANIZED HEALTH CARE EDUCATION/TRAINING PROGRAM

## 2022-09-29 PROCEDURE — 27000221 HC OXYGEN, UP TO 24 HOURS

## 2022-09-29 PROCEDURE — 20000000 HC ICU ROOM

## 2022-09-29 PROCEDURE — 97530 THERAPEUTIC ACTIVITIES: CPT

## 2022-09-29 PROCEDURE — 80048 BASIC METABOLIC PNL TOTAL CA: CPT | Performed by: STUDENT IN AN ORGANIZED HEALTH CARE EDUCATION/TRAINING PROGRAM

## 2022-09-29 PROCEDURE — 97162 PT EVAL MOD COMPLEX 30 MIN: CPT

## 2022-09-29 PROCEDURE — 99900035 HC TECH TIME PER 15 MIN (STAT)

## 2022-09-29 PROCEDURE — 99900026 HC AIRWAY MAINTENANCE (STAT)

## 2022-09-29 PROCEDURE — 97165 OT EVAL LOW COMPLEX 30 MIN: CPT

## 2022-09-29 PROCEDURE — 25000003 PHARM REV CODE 250: Performed by: OTOLARYNGOLOGY

## 2022-09-29 PROCEDURE — 85027 COMPLETE CBC AUTOMATED: CPT | Performed by: STUDENT IN AN ORGANIZED HEALTH CARE EDUCATION/TRAINING PROGRAM

## 2022-09-29 RX ORDER — GUAIFENESIN 100 MG/5ML
200 SOLUTION ORAL EVERY 4 HOURS PRN
Status: DISCONTINUED | OUTPATIENT
Start: 2022-09-29 | End: 2022-09-29

## 2022-09-29 RX ORDER — GUAIFENESIN 100 MG/5ML
200 SOLUTION ORAL EVERY 4 HOURS PRN
Status: DISCONTINUED | OUTPATIENT
Start: 2022-09-29 | End: 2022-10-06 | Stop reason: HOSPADM

## 2022-09-29 RX ORDER — PANTOPRAZOLE SODIUM 40 MG/1
40 FOR SUSPENSION ORAL DAILY
Status: DISCONTINUED | OUTPATIENT
Start: 2022-09-29 | End: 2022-10-06 | Stop reason: HOSPADM

## 2022-09-29 RX ORDER — SODIUM CHLORIDE FOR INHALATION 3 %
4 VIAL, NEBULIZER (ML) INHALATION EVERY 6 HOURS PRN
Status: DISCONTINUED | OUTPATIENT
Start: 2022-09-29 | End: 2022-10-06 | Stop reason: HOSPADM

## 2022-09-29 RX ORDER — ENOXAPARIN SODIUM 100 MG/ML
40 INJECTION SUBCUTANEOUS EVERY 24 HOURS
Status: DISCONTINUED | OUTPATIENT
Start: 2022-09-29 | End: 2022-10-06 | Stop reason: HOSPADM

## 2022-09-29 RX ADMIN — MUPIROCIN 1 G: 20 OINTMENT TOPICAL at 08:09

## 2022-09-29 RX ADMIN — BACITRACIN: 500 OINTMENT TOPICAL at 03:09

## 2022-09-29 RX ADMIN — MUPIROCIN 1 G: 20 OINTMENT TOPICAL at 09:09

## 2022-09-29 RX ADMIN — GUAIFENESIN 200 MG: 100 SOLUTION ORAL at 09:09

## 2022-09-29 RX ADMIN — GUAIFENESIN 200 MG: 100 SOLUTION ORAL at 07:09

## 2022-09-29 RX ADMIN — BACITRACIN: 500 OINTMENT TOPICAL at 08:09

## 2022-09-29 RX ADMIN — PANTOPRAZOLE SODIUM 40 MG: 40 GRANULE, DELAYED RELEASE ORAL at 09:09

## 2022-09-29 RX ADMIN — ACETAMINOPHEN 650 MG: 325 TABLET ORAL at 05:09

## 2022-09-29 RX ADMIN — HYDROCODONE BITARTRATE AND ACETAMINOPHEN 15 ML: 7.5; 325 SOLUTION ORAL at 07:09

## 2022-09-29 RX ADMIN — BACITRACIN: 500 OINTMENT TOPICAL at 09:09

## 2022-09-29 RX ADMIN — MAGNESIUM SULFATE 2 G: 2 INJECTION INTRAVENOUS at 03:09

## 2022-09-29 RX ADMIN — ENOXAPARIN SODIUM 40 MG: 100 INJECTION SUBCUTANEOUS at 05:09

## 2022-09-29 RX ADMIN — HYDROCODONE BITARTRATE AND ACETAMINOPHEN 15 ML: 7.5; 325 SOLUTION ORAL at 04:09

## 2022-09-29 RX ADMIN — ACETAMINOPHEN 650 MG: 325 TABLET ORAL at 11:09

## 2022-09-29 RX ADMIN — HYDROCODONE BITARTRATE AND ACETAMINOPHEN 15 ML: 7.5; 325 SOLUTION ORAL at 10:09

## 2022-09-29 RX ADMIN — ACETAMINOPHEN 650 MG: 325 TABLET ORAL at 12:09

## 2022-09-29 NOTE — PLAN OF CARE
[facility] - Surgical Intensive Care  Initial Discharge Assessment       Primary Care Provider: Marcello Benavidez MD    Admission Diagnosis: Malignant neoplasm of oral cavity [C06.9]    Admission Date: 9/28/2022  Expected Discharge Date:     Discharge Barriers Identified: None    Payor: [facility] MCARE / Plan: [facility] DUAL COMPLETE HMO SNP / Product Type: Medicare Advantage /     Extended Emergency Contact Information  Primary Emergency Contact: Jeannie Apodaca  Mobile Phone: [phone]  Relation: Daughter  Preferred language: English   needed? No  Secondary Emergency Contact: Jose Alberto Andre  Mobile Phone: [phone]  Relation: Spouse    Discharge Plan A: Home Health  Discharge Plan B: Skilled Nursing Facility      [facility] - [place], [place] - [address] AT [facility]  [address]  [place] [address]  Phone: [phone] Fax: [phone]    [facility] [place] [id number]  [facility] [address]  Phone: [phone] Fax: [phone]      Initial Assessment (most recent)       Adult Discharge Assessment - 09/29/22 1038          Discharge Assessment    Assessment Type Discharge Planning Assessment     Confirmed/corrected address, phone number and insurance Yes     Confirmed Demographics Correct on Facesheet     Source of Information family     If unable to respond/provide information was family/caregiver contacted? Yes     Contact Name/Number  Jose Alberto Andre [phone]     Communicated CHAR with patient/caregiver Date not available/Unable to determine     Reason For Admission Malignant neoplasm of oral cavity     Lives With spouse     Facility Arrived From: Home     Do you expect to return to your current living situation? Yes     Do you have help at home or someone to help you manage your care at home? Yes     Who are your caregiver(s) and their phone number(s)?  Jose Alberto Andre [phone]     Prior to hospitilization  cognitive status: Alert/Oriented     Current cognitive status: Alert/Oriented     Walking or Climbing Stairs Difficulty none     Dressing/Bathing Difficulty none     Home Accessibility not wheelchair accessible     Home Layout Able to live on 1st floor     Equipment Currently Used at Home none   Pt has a RW and BSC, but doesn't use them    Readmission within 30 days? No     Patient currently being followed by outpatient case management? No     Do you currently have service(s) that help you manage your care at home? No     Do you take prescription medications? Yes     Do you have prescription coverage? Yes     Coverage [facility] Medicare     Do you have any problems affording any of your prescribed medications? No     Is the patient taking medications as prescribed? yes     Who is going to help you get home at discharge?  Jose Alberto Andre [phone]     How do you get to doctors appointments? family or friend will provide;car, drives self     Are you on dialysis? No     Do you take coumadin? No     Discharge Plan A Home Health     Discharge Plan B Skilled Nursing Facility     DME Needed Upon Discharge  --   TBD    Discharge Plan discussed with: Spouse/sig other     Name(s) and Number(s)  Jose Alberto Andre [phone]     Discharge Barriers Identified None        Physical Activity    On average, how many days per week do you engage in moderate to strenuous exercise (like a brisk walk)? 0 days     On average, how many minutes do you engage in exercise at this level? 0 min        Financial Resource Strain    How hard is it for you to pay for the very basics like food, housing, medical care, and heating? Hard        Housing Stability    In the last 12 months, was there a time when you were not able to pay the mortgage or rent on time? Yes     In the last 12 months, how many places have you lived? 1     In the last 12 months, was there a time when you did not have a steady place to sleep or slept in a shelter (including  now)? No        Transportation Needs    In the past 12 months, has lack of transportation kept you from medical appointments or from getting medications? No     In the past 12 months, has lack of transportation kept you from meetings, work, or from getting things needed for daily living? No        Food Insecurity    Within the past 12 months, you worried that your food would run out before you got the money to buy more. Sometimes true     Within the past 12 months, the food you bought just didn't last and you didn't have money to get more. Sometimes true        Stress    Do you feel stress - tense, restless, nervous, or anxious, or unable to sleep at night because your mind is troubled all the time - these days? Only a little        Social Connections    In a typical week, how many times do you talk on the phone with family, friends, or neighbors? More than three times a week     How often do you get together with friends or relatives? More than three times a week     How often do you attend Episcopalian or Orthodox services? Never     Do you belong to any clubs or organizations such as Episcopalian groups, unions, fraternal or athletic groups, or school groups? No     How often do you attend meetings of the clubs or organizations you belong to? Never     Are you , , , , never , or living with a partner?         Alcohol Use    Q1: How often do you have a drink containing alcohol? Never     Q2: How many drinks containing alcohol do you have on a typical day when you are drinking? Patient does not drink     Q3: How often do you have six or more drinks on one occasion? Never        Relationship/Environment    Name(s) of Who Lives With Patient  Jose Alberto Andre [phone]                   Pt lives with her  and 3 dogs in a one story home with 3 ARTUR. Pt was independent with ADL's, is not on dialysis or Coumadin.     Pt had home health in the past with [facility]  [phone] in [place], [place]. She also used [facility] for feeding [phone].    Pt's  discussed how they sometimes have trouble paying for things, such as rent and groceries. However, he stated they have friends and family who live nearby, their daughter lives across the street, and they can rely on them for help. The Pt and her  rent their home from their daughter, so housing is stable. When asked if they'd like resources on food jain in their area, Pt's  declined, stating their family helps them.     SW will follow for discharge planning needs.     ZAHIRA Rawls, LMSW [facility]  [id number]

## 2022-09-29 NOTE — PLAN OF CARE
Recommendations    1) Recommend [employer] 1.4 @ 50ml/hr   -Provides 1680 kcals, 74g Protein, 864 ml fl     2) ADAT to regular diet     3) RD to follow    Goals: Meet % EEN by next RD f/u date  Nutrition Goal Status: new  Communication of RD Recs: other (comment) (POC)

## 2022-09-29 NOTE — PLAN OF CARE
PT eval complete, plan of care established    2022    Problem: Physical Therapy  Goal: Physical Therapy Goal  Description: Goals to be met by: 10/13/2022     Patient will increase functional independence with mobility by performin. Sit to stand transfer with independence  2. Gait  x 200 feet with independence  3. Ascend/descend 2 stair with no handrails supervision using LRAD as needed  4. Lower extremity exercise program x10 reps per handout, with independence   Outcome: Ongoing, Progressing

## 2022-09-29 NOTE — ASSESSMENT & PLAN NOTE
  Neuro/Psych:   -- Sedation: None  -- Pain: Tylenol, hydrocodone, dilaudid PRN             Cards:   -- HDS  -- CTM      Pulm:   -- Goal O2 sat > 90%  -- On trach collar s/p recent trach  -- Wean as able  -- Mucus Secretions:   - Mucinex              - Hypertonic saline      Renal:  -- Keep pierson for strict I/O  -- BUN/Cr 9/0.6      FEN / GI:   -- Net 2,965  -- Replace lytes as needed  -- Nutrition: NPO  -- G tube ok for meds  -- Tube feeds 9/29      ID:   -- Tm: afebrile; WBC 11.6  -- Abx: None indicated      Heme/Onc:   -- H/H stable 9.4/29.5  -- Daily CBC      Endo:   -- Gluc goal 140-180  -- SSI      PPx:   Feeding: NPO  Analgesia/Sedation: As above  Thromboembolic prevention: Held in acute setting  HOB >30: Yes  Stress Ulcer ppx: Pantoprazole (home med)  Glucose control: Critical care goal 140-180 g/dl, ISS     Lines/Drains/Airway: A-line, 3 PIV, PEG, G-tube, pierson, 2 chest drains, 1 neck drain, trach      Dispo/Code Status/Palliative:   -- SICU / Full Code

## 2022-09-29 NOTE — PROGRESS NOTES
[facility] - Surgical Intensive Care  Otorhinolaryngology-Head & Neck Surgery  Progress Note    Subjective:   Interval History: no issues overnight     Objective:     Vital Signs (24h Range):  Temp:  [96 °F (35.6 °C)-97.6 °F (36.4 °C)] 97.6 °F (36.4 °C)  Pulse:  [54-99] 75  Resp:  [0-40] 18  SpO2:  [91 %-100 %] 98 %  BP: ()/(37-63) 108/54  Arterial Line BP: (101-186)/(37-75) 129/45     Physical Exam  Awake, follows commands, responds appropriately  Face symmetric  Intra-oral flap appears perfused and viable, suture lines intact  Neck incision approximated with staples  Neck is soft, no fluid collections, Right RENETTA drain with sanguinous output  6-0 cuffed tracheostomy tube  Chest incision approximated with staples, Jpx2 in place with sanguinous output  Abdomen soft, nt, G tube in place    Assessment/Plan:     * Malignant neoplasm of oral cavity  Recurrent SCC of OC s/p composite resection and Pec flap reconstruction on 9/28    - Fresh trach care, cuff let down this morning followed by abundant secretions. Pt had difficulty clearing secretions. Suction frequently, saline bullets as needed to thin mucous. Change inner cannula frequently. Keep on tele/continuous pulse ox, Monitor closely.   - trach collar with humidified O2  - NPO  - start TFs today   - daily cbc, bmp  - lovenox   - PT/OT    Disposition: continue care in ICU

## 2022-09-29 NOTE — ASSESSMENT & PLAN NOTE
Recurrent SCC of OC s/p composite resection and Pec flap reconstruction on 9/28    - Fresh trach care, cuff let down this morning  - trach collar with humidified O2  - NPO  - start TFs today   - daily cbc, bmp  - lovenox   - PT/OT    Disposition: continue care in ICU

## 2022-09-29 NOTE — PROGRESS NOTES
[facility] - [facility] Intensive [facility]  Critical Care - Surgery  Progress Note    Patient Name: Jennifer Andre  MRN: [id number]  Admission Date: 9/28/2022  Hospital Length of Stay: 1 days  Code Status: Full Code  Attending Provider: Wallace Santiago MD  Primary Care Provider: Marcello Benavidez MD   Principal Problem: <principal problem not specified>    Subjective:     Hospital/ICU Course:  Patient admitted to ICU 9/28 s/p revision tracheostomy, right partial glossectomy with resection of floor of mouth and right buccal mucosa, and right soft palate with pectoral flap, not requiring checks. Patient arrived to ICU on vent but transitioned to trach collar.    Interval History/Significant Events: POD 1. Having thick secretions postop. She has a history of this however it worsened postop. On trach collar. HDS. VSWNL. Denies pain.     Follow-up For: Procedure(s) (LRB):  EXCISION, NEOPLASM, MOUTH (Right)  TRACHEOTOMY (N/A)  CREATION, FREE FLAP (Right)  INSERTION, PEG TUBE (N/A)    Post-Operative Day: 1 Day Post-Op    Objective:     Vital Signs (Most Recent):  Temp: 97.6 °F (36.4 °C) (09/29/22 0300)  Pulse: 63 (09/29/22 0645)  Resp: 17 (09/29/22 0645)  BP: (!) 100/49 (09/29/22 0600)  SpO2: (!) 92 % (09/29/22 0645)   Vital Signs (24h Range):  Temp:  [96 °F (35.6 °C)-97.6 °F (36.4 °C)] 97.6 °F (36.4 °C)  Pulse:  [54-99] 63  Resp:  [0-40] 17  SpO2:  [91 %-100 %] 92 %  BP: ()/(49-63) 100/49  Arterial Line BP: (101-186)/(37-75) 101/37     Weight: 57.6 kg (127 lb)  Body mass index is 22.5 kg/m².      Intake/Output Summary (Last 24 hours) at 9/29/2022 0702  Last data filed at 9/29/2022 0600  Gross per 24 hour   Intake 6225.45 ml   Output 2150 ml   Net 4075.45 ml       Physical Exam  Constitutional:       Comments: Alert, oriented, comfortable.    HENT:      Head:      Comments: Changes noted consistent with prior left mandibular surgery  Eyes:      Pupils: Pupils are equal, round, and reactive to light.   Neck:      Comments:  Neck drain in place  Staple line CDI, bulge noted secondary to pec flap  Tracheostomy site noted  Cardiovascular:      Rate and Rhythm: Normal rate and regular rhythm.      Pulses: Normal pulses.   Pulmonary:      Effort: Pulmonary effort is normal.      Breath sounds: Normal breath sounds.   Abdominal:      General: Abdomen is flat. Bowel sounds are normal.      Palpations: Abdomen is soft.      Comments: PEG tube in place with bilious drainage   Musculoskeletal:      Comments: Left chest staple line CDI      Left hand swelling at IV site   Skin:     Comments: 2 chest drains in place     Vents:  Oxygen Concentration (%): 28 (09/29/22 0600)    Lines/Drains/Airways       Drain  Duration                  Gastrostomy/Enterostomy 05/30/19 1140 Percutaneous endoscopic gastrostomy (PEG) LUQ feeding 1217 days         Closed/Suction Drain 09/28/22 1227 Right Chest Bulb 15 Fr. <1 day         Closed/Suction Drain 09/28/22 1228 Right Chest Bulb 15 Fr. <1 day         Closed/Suction Drain 09/28/22 1320 Right Neck Bulb 10 Fr. <1 day         Gastrostomy/Enterostomy 09/28/22 0944 Gastrostomy tube w/ balloon midline feeding <1 day         Urethral Catheter 09/28/22 0915 Temperature probe;Non-latex 14 Fr. <1 day              Airway  Duration                  Surgical Airway 06/06/19 0800 Shiley Uncuffed 1210 days         Airway - Non-Surgical 09/28/22 0917 <1 day         Surgical Airway 09/28/22 1302 Shiley Cuffed <1 day              Arterial Line  Duration             Arterial Line 09/28/22 1343 Right Radial <1 day              Peripheral Intravenous Line  Duration                  Peripheral IV - Single Lumen 20 G Left;Posterior Wrist -- days         Peripheral IV - Single Lumen 09/28/22 0725 20 G Right Forearm <1 day         Peripheral IV - Single Lumen 09/28/22 1118 18 G Right Ankle <1 day                    Significant Labs:    CBC/Anemia Profile:  Recent Labs   Lab 09/27/22  1346 09/28/22  1439 09/29/22  0254   WBC 8.18 12.22  11.69   HGB 13.2 11.0* 9.4*   HCT 40.9 33.6* 29.5*    171 148*   MCV 95 96 97   RDW 12.9 12.8 12.8        Chemistries:  Recent Labs   Lab 09/27/22  1346 09/28/22  1439 09/29/22  0254    138 136   K 4.3 4.2 4.1    112* 111*   CO2 25 18* 20*   BUN 8 7* 9   CREATININE 0.8 0.6 0.6   CALCIUM 9.6 7.4* 7.8*   ALBUMIN  --  2.9*  --    PROT  --  5.5*  --    BILITOT  --  0.3  --    ALKPHOS  --  69  --    ALT  --  9*  --    AST  --  16  --    MG  --   --  1.7   PHOS  --   --  2.9         Significant Imaging:  I have reviewed all pertinent imaging results/findings within the past 24 hours.    Assessment/Plan:     Squamous cell carcinoma    Neuro/Psych:   -- Sedation: None  -- Pain: Tylenol, hydrocodone, dilaudid PRN             Cards:   -- HDS  -- CTM  -- D/C a-line      Pulm:   -- Goal O2 sat > 90%  -- On trach collar s/p recent trach revision  -- Mucus Secretions:   - Mucinex      Renal:  -- Keep pierson for strict I/O  -- BUN/Cr 9/0.6      FEN / GI:   -- Net 2,965  -- Replace lytes as needed  -- Nutrition: NPO  -- G tube ok for meds  -- Start tube feeds 9/29      ID:   -- Tm: afebrile; WBC 11.6  -- Abx: None indicated      Heme/Onc:   -- H/H stable 9.4/29.5  -- Daily CBC      Endo:   -- Gluc goal 140-180  -- SSI      PPx:   Feeding: NPO  Analgesia/Sedation: As above  Thromboembolic prevention: Held in acute setting  HOB >30: Yes  Stress Ulcer ppx: Pantoprazole (home med)  Glucose control: Critical care goal 140-180 g/dl, ISS     Lines/Drains/Airway: d/c A-line, 3 PIV, PEG, pierson, 2 chest drains, 1 neck drain, trach      Dispo/Code Status/Palliative:   -- SICU / Full Code   -- Stepdown today.        Smiley Barber MD  Critical Care - Surgery  [facility]

## 2022-09-29 NOTE — PT/OT/SLP EVAL
"Physical Therapy Co-Evaluation and Co-Treatment    Patient Name:  Jennifer Andre   MRN:  [id number]    Recommendations:     Discharge Recommendations:  home health PT   Discharge Equipment Recommendations: none   Barriers to discharge: None    Assessment:     Jennifer Andre is a 67 y.o. female admitted with a medical diagnosis of Malignant neoplasm of oral cavity.  She presents with the following impairments/functional limitations:  weakness, impaired endurance, impaired self care skills, impaired functional mobility, gait instability, impaired balance, decreased lower extremity function, pain, impaired cardiopulmonary response to activity. Patient tolerated session well.    Rehab Prognosis: Good; patient would benefit from acute skilled PT services 3 x/week to address these deficits and reach maximum level of function.  Recent Surgery: Procedure(s) (LRB):  EXCISION, NEOPLASM, MOUTH (Right)  TRACHEOTOMY (N/A)  CREATION, FREE FLAP (Right)  INSERTION, PEG TUBE (N/A) 1 Day Post-Op    Plan:     During this hospitalization, patient to be seen 3 x/week to address the identified rehab impairments via gait training, therapeutic activities, therapeutic exercises, neuromuscular re-education and progress toward the following goals:    Plan of Care Expires:  10/29/22    Subjective     Chief Complaint: None verbalized   Patient/Family Comments/Goals: "I'm home most of the time"  Pain/Comfort:  Pain Rating 1: 0/10    Patients cultural, spiritual, [employer] conflicts given the current situation: no    Living Environment:  Patient lives with their spouse in a single story home, number of outside stairs: 2 with L HR, walk-in shower. Their property is also shared with their daughter.  Prior to admission, patient was independent with ADLs, driving, not working. Patient uses DME as follows: none. DME owned (not currently used): rolling walker and shower chair. Upon discharge, patient will have assistance from family.    Objective: "     Communicated with nursing prior to session.  Patient found HOB elevated with blood pressure cuff, peripheral IV, pulse ox (continuous), telemetry, Tracheostomy, oxygen, RENETTA drain upon PT entry to room.    General Precautions: Standard, fall, respiratory   Orthopedic Precautions:N/A   Braces: N/A    Exams:  Cognitive Exam:  Patient is oriented to unable to assess, patient non-verbal, follows commands 100% of the time  RLE ROM: WFL  RLE Strength: WFL  LLE ROM: WFL  LLE Strength: WFL  Sensation: Intact light touch to BLEs    Functional Mobility:  Bed Mobility:     Rolling Left:  stand by assistance  Supine to Sit: stand by assistance  Transfers:     Sit to Stand: contact guard assistance with no AD  Bed to Chair: contact guard assistance with no AD using Stand Pivot  Gait: N/A, t/f via stand pivot  Balance:   Static Sitting: Good, able to maintain for 3 minute(s) with stand by assistance  Dynamic Sitting: Fair: Patient accepts minimal challenge, stand by assistance  Static Standing: Good, able to maintain for 10 seconds with contact guard assistance  Dynamic Standing: Fair: Patient accepts minimal challenge, contact guard assistance    Therapeutic Activities and Exercises:  Patient educated on role of acute care PT and PT POC, safety while in hospital including calling nurse for mobility, and call light usage  Patient is clear to stand pivot transfer with RN/PCT, assist x1  Patient performed mobility as described above    AM-PAC 6 CLICK MOBILITY  Total Score:18     Patient left up in chair with all lines intact, call button in reach, and OT present.    GOALS:   Multidisciplinary Problems       Physical Therapy Goals          Problem: Physical Therapy    Goal Priority Disciplines Outcome Goal Variances Interventions   Physical Therapy Goal     PT, PT/OT Ongoing, Progressing     Description: Goals to be met by: 10/13/2022     Patient will increase functional independence with mobility by performin. Sit to stand  transfer with independence  2. Gait  x 200 feet with independence  3. Ascend/descend 2 stair with no handrails supervision using LRAD as needed  4. Lower extremity exercise program x10 reps per handout, with independence                        History:     Past Medical History:   Diagnosis Date    Cancer        Past Surgical History:   Procedure Laterality Date    APPENDECTOMY      CHOLECYSTECTOMY      DISSECTION OF NECK Right 5/30/2019    Procedure: DISSECTION, NECK;  Surgeon: Wallace Santiago MD;  [facility];  Service: ENT;  Laterality: Right;    ESOPHAGOGASTRODUODENOSCOPY W/ PEG  5/30/2019    Procedure: EGD, WITH PEG TUBE INSERTION;  Surgeon: Ottoniel Jimenez MD;  [facility];  Service: General;;    FLAP PROCEDURE Right 5/30/2019    Procedure: CREATION, FREE FLAP;  Surgeon: Navdeep Dangelo MD;  [facility];  Service: Plastics;  Laterality: Right;  ischemia time 2295-5218  flap rt lower leg to rt neck    FLAP PROCEDURE Right 9/28/2022    Procedure: CREATION, FREE FLAP;  Surgeon: Milly Price MD;  [address]  Service: ENT;  Laterality: Right;    HYSTERECTOMY      MANDIBLE SURGERY      SURGICAL REMOVAL OF NEOPLASM OF MOUTH Right 9/28/2022    Procedure: EXCISION, NEOPLASM, MOUTH;  Surgeon: Wallace Santiago MD;  [facility];  Service: ENT;  Laterality: Right;    TRACHEOTOMY N/A 5/30/2019    Procedure: TRACHEOTOMY;  Surgeon: Navdeep Dangelo MD;  [facility];  Service: Plastics;  Laterality: N/A;    TRACHEOTOMY N/A 9/28/2022    Procedure: TRACHEOTOMY;  Surgeon: Wallace Santiago MD;  [facility];  Service: ENT;  Laterality: N/A;       Time Tracking:     PT Received On: 09/29/22  PT Start Time: 1339     PT Stop Time: 1359  PT Total Time (min): 20 min     Billable Minutes: Evaluation 10 min Therapeutic Activity 8 min      09/29/2022    Co-evaluation and co-treatment performed for this visit due to suspected  patient need for two skilled therapists to ensure patient and staff safety and to accommodate for patient activity tolerance/pain management

## 2022-09-29 NOTE — SUBJECTIVE & OBJECTIVE
Interval History/Significant Events: POD 1. Having thick secretions postop. She has a history of this however it worsened postop. On trach collar. HDS. VSWNL. Denies pain.     Follow-up For: Procedure(s) (LRB):  EXCISION, NEOPLASM, MOUTH (Right)  TRACHEOTOMY (N/A)  CREATION, FREE FLAP (Right)  INSERTION, PEG TUBE (N/A)    Post-Operative Day: 1 Day Post-Op    Objective:     Vital Signs (Most Recent):  Temp: 97.6 °F (36.4 °C) (09/29/22 0300)  Pulse: 63 (09/29/22 0645)  Resp: 17 (09/29/22 0645)  BP: (!) 100/49 (09/29/22 0600)  SpO2: (!) 92 % (09/29/22 0645)   Vital Signs (24h Range):  Temp:  [96 °F (35.6 °C)-97.6 °F (36.4 °C)] 97.6 °F (36.4 °C)  Pulse:  [54-99] 63  Resp:  [0-40] 17  SpO2:  [91 %-100 %] 92 %  BP: ()/(49-63) 100/49  Arterial Line BP: (101-186)/(37-75) 101/37     Weight: 57.6 kg (127 lb)  Body mass index is 22.5 kg/m².      Intake/Output Summary (Last 24 hours) at 9/29/2022 0702  Last data filed at 9/29/2022 0600  Gross per 24 hour   Intake 6225.45 ml   Output 2150 ml   Net 4075.45 ml       Physical Exam  Constitutional:       Comments: Alert, oriented, comfortable.    HENT:      Head:      Comments: Changes noted consistent with prior left mandibular surgery  Eyes:      Pupils: Pupils are equal, round, and reactive to light.   Neck:      Comments: Neck drain in place  Staple line CDI, bulge noted secondary to pec flap  Tracheostomy site noted  Cardiovascular:      Rate and Rhythm: Normal rate and regular rhythm.      Pulses: Normal pulses.   Pulmonary:      Effort: Pulmonary effort is normal.      Breath sounds: Normal breath sounds.   Abdominal:      General: Abdomen is flat. Bowel sounds are normal.      Palpations: Abdomen is soft.      Comments: PEG tube in place with bilious drainage   Musculoskeletal:      Comments: Left chest staple line CDI   Skin:     Comments: 2 chest drains in place     Vents:  Oxygen Concentration (%): 28 (09/29/22 0600)    Lines/Drains/Airways       Drain  Duration                   Gastrostomy/Enterostomy 05/30/19 1140 Percutaneous endoscopic gastrostomy (PEG) LUQ feeding 1217 days         Closed/Suction Drain 09/28/22 1227 Right Chest Bulb 15 Fr. <1 day         Closed/Suction Drain 09/28/22 1228 Right Chest Bulb 15 Fr. <1 day         Closed/Suction Drain 09/28/22 1320 Right Neck Bulb 10 Fr. <1 day         Gastrostomy/Enterostomy 09/28/22 0944 Gastrostomy tube w/ balloon midline feeding <1 day         Urethral Catheter 09/28/22 0915 Temperature probe;Non-latex 14 Fr. <1 day              Airway  Duration                  Surgical Airway 06/06/19 0800 Shiley Uncuffed 1210 days         Airway - Non-Surgical 09/28/22 0917 <1 day         Surgical Airway 09/28/22 1302 Shiley Cuffed <1 day              Arterial Line  Duration             Arterial Line 09/28/22 1343 Right Radial <1 day              Peripheral Intravenous Line  Duration                  Peripheral IV - Single Lumen 20 G Left;Posterior Wrist -- days         Peripheral IV - Single Lumen 09/28/22 0725 20 G Right Forearm <1 day         Peripheral IV - Single Lumen 09/28/22 1118 18 G Right Ankle <1 day                    Significant Labs:    CBC/Anemia Profile:  Recent Labs   Lab 09/27/22  1346 09/28/22  1439 09/29/22  0254   WBC 8.18 12.22 11.69   HGB 13.2 11.0* 9.4*   HCT 40.9 33.6* 29.5*    171 148*   MCV 95 96 97   RDW 12.9 12.8 12.8        Chemistries:  Recent Labs   Lab 09/27/22  1346 09/28/22  1439 09/29/22  0254    138 136   K 4.3 4.2 4.1    112* 111*   CO2 25 18* 20*   BUN 8 7* 9   CREATININE 0.8 0.6 0.6   CALCIUM 9.6 7.4* 7.8*   ALBUMIN  --  2.9*  --    PROT  --  5.5*  --    BILITOT  --  0.3  --    ALKPHOS  --  69  --    ALT  --  9*  --    AST  --  16  --    MG  --   --  1.7   PHOS  --   --  2.9         Significant Imaging:  I have reviewed all pertinent imaging results/findings within the past 24 hours.

## 2022-09-29 NOTE — PLAN OF CARE
Problem: Occupational Therapy  Goal: Occupational Therapy Goal  Description: Goals set on 9/29 with expiration date 10/20:  Patient will increase functional independence with ADLs by performing:    Supine <> Sit with [place]  Grooming while standing at sink with Mod [place].  UB Dressing with Mod [place].  LB Dressing with Mod [place].  Step transfer with Mod [place] with DME as needed.  Pt will demonstrate understanding of education provided regarding energy conservation and task modification through teach-back method.     Outcome: Ongoing, Progressing  Once patient is medically stable, patient is safe to discharge home with continued OT services via [facility]OT.

## 2022-09-29 NOTE — SUBJECTIVE & OBJECTIVE
Interval History: no issues overnight       Objective:     Vital Signs (24h Range):  Temp:  [96 °F (35.6 °C)-97.6 °F (36.4 °C)] 97.6 °F (36.4 °C)  Pulse:  [54-99] 75  Resp:  [0-40] 18  SpO2:  [91 %-100 %] 98 %  BP: ()/(37-63) 108/54  Arterial Line BP: (101-186)/(37-75) 129/45       Physical Exam  Awake, follows commands, responds appropriately  Face symmetric  Intra-oral flap appears perfused and viable, suture lines intact  Neck incision approximated with staples  Neck is soft, no fluid collections, Right RENETTA drain with sanguinous output  6-0 cuffed tracheostomy tube  Chest incision approximated with staples, Jpx2 in place with sanguinous output  Abdomen soft, nt, G tube in place

## 2022-09-29 NOTE — PT/OT/SLP EVAL
Occupational Therapy   Co-Evaluation and Treatment  Co-treat performed due to acuity and complexity of pt's medical status with 2 skilled disciplines needed to optimize pts functional performance in ICU setting.    Name: Jennifer Andre  MRN: [id number]  Admitting Diagnosis:  Malignant neoplasm of oral cavity 1 Day Post-Op  Length of Stay: 1 days    Recommendations:     Discharge Recommendations: home health OT  Discharge Equipment Recommendations:  none  Barriers to discharge:  None    Plan:     Patient to be seen 3 x/week to address the above listed problems via self-care/home management, therapeutic activities, therapeutic exercises  Plan of Care Expires: 10/29/22  Plan of Care Reviewed with: patient, spouse    Assessment:     Jennifer Andre is a 67 y.o. female with a medical diagnosis of Malignant neoplasm of oral cavity.  She presents with the following performance deficits affecting function: weakness, impaired endurance, impaired self care skills, impaired functional mobility, gait instability, impaired balance, decreased lower extremity function, pain, impaired cardiopulmonary response to activity.      Pt tolerated session well, OT reccs HHOT.  Of note, pt with x1 episode of suspected mucus plug, unable to inhale, suction provided, RT and RN informed. RN to chairside and provided deep suction, pt able to recover breath and was in good spirits upon OT exit.     Rehab Prognosis: Good; patient would benefit from acute skilled OT services to address these deficits and reach maximum level of function.       Subjective   Communicated with: RN prior to session.  Patient found HOB elevated with blood pressure cuff, peripheral IV, pulse ox (continuous), telemetry, Tracheostomy, oxygen upon OT entry to room.    Chief Complaint: No chief complaint on file.    Patient/Family Comments/goals: nonverbal 2* trach, pleasant nodding/smiling, able to direct care and make needs known    Pain/Comfort:  Pain Rating 1:  "0/10  Pain Rating Post-Intervention 1: 0/10    Patients cultural, spiritual, Sikhism conflicts given the current situation: no    Occupational Profile:  Living Environment: pt lives with spouse in H, 2STE, L HR, walk-in shower. Of note, pt's home shares property with daughter's home.   Prior Level of Function: Patient reports being IND with mobility & with ADLs.   Patient uses DME as follows: other (see comments) (owns RW and shower chairnot currently used).   DME owned (not currently used): rolling walker and shower chair.  Roles/Repsonsibilities:   Hand Dominance: right   Work: disabled.   Drive: no.   Managing Medicines/Managing Home: yes.   Hobbies: enjoys being active and independent.  Equipment Used at Home:  other (see comments) (owns RW and shower chairnot currently used)    Patient reports they will have assistance from spouse and daughter upon discharge.      Objective:     Patient found with: blood pressure cuff, peripheral IV, pulse ox (continuous), telemetry, Tracheostomy, oxygen   General Precautions: Standard, Cardiac fall, respiratory   Orthopedic Precautions:N/A   Braces: N/A   Respiratory Status:   Trach to 5L at 28%  Vitals: BP (!) 95/51 (BP Location: Left arm, Patient Position: Lying)   Pulse 83   Temp 97.3 °F (36.3 °C)   Resp 19   Ht 5' 2.99" (1.6 m)   Wt 56.2 kg (124 lb)   SpO2 (!) 94%   Breastfeeding No   BMI 21.97 kg/m²     Cognitive and Psychosocial Function:   AxOx4 --    Follows Commands/attention:follows two-step commands  Communication:  nonverbal 2* trach, able to nod/gesture appropriately   Memory: No Deficits noted  Safety awareness/insight to disability: intact   Mood/Affect/Coping skills/emotional control: Appropriate to situation    Hearing: Intact    Vision:  wears glasses - spouse to bring glasses next day    Physical Exam:  Postural examination/scapula alignment:    -       Rounded shoulders  -       Forward head  Skin integrity: Visible skin intact     Left UE " Right UE   UE Edema present absent   UE ROM AROM WFL AROM WFL and soreness 2* pectoral sutures   UE Strength adequate ROM, adequate strength adequate ROM, adequate strength    Strength grasp WFL grasp WFL   Sensation LUE INTACT:WFL RUE INTACT: WFL   Fine Motor Coordination:  LUE INTACT: WFL RUE INTACT: WFL   Gross Motor Coordination: LUE INTACT: WFL RUE INTACT:WFL     Occupational Performance:  Bed Mobility:    Patient completed Rolling/Turning to Left with  stand by assistance  Patient completed Supine to Sit with stand by assistance on L side of bed  Scooting anteriorly to EOB to have both feet planted on floor: stand by assistance    Functional Mobility/Transfers:  Static Sitting EOB: supervision  Dynamic Sitting EOB: supervision  Patient completed Sit <> Stand Transfer with contact guard assistance  with  no assistive device   Static Standing Balance: CGA no AD  Dynamic Standing Balance: CGA no AD  Patient completed Bed <> Chair Transfer using Stand Pivot technique with contact guard assistance with no assistive device      Activities of Daily Living:  Feeding:   NPO  Grooming: contact guard assistance seated in chair washing hair with shower cap and combing hair, extra time needed 2* impaired activity endurance but tolerated well.  Of note, pt with episode of suspected mucosal plug, RN/RT notified and present to assist. Pt recovered prior to OT exit.   Toileting:  pierson in place      AMPAC 6 Click ADL:  AMPAC Total Score: 20    Treatment & Education:  -OT POC, safety during ADLs and mobility   -Education on energy conservation and task modification to maximize safety and independence  -Questions answered within OT scope of practice.      Patient left up in chair with all lines intact, call button in reach, RN notified, and RT and spouse present    GOALS:   Multidisciplinary Problems       Occupational Therapy Goals          Problem: Occupational Therapy    Goal Priority Disciplines Outcome Interventions    Occupational Therapy Goal     OT, PT/OT Ongoing, Progressing    Description: Goals set on 9/29 with expiration date 10/20:  Patient will increase functional independence with ADLs by performing:    Supine <> Sit with [place]  Grooming while standing at sink with Mod [place].  UB Dressing with Mod [place].  LB Dressing with Mod [place].  Step transfer with Mod [place] with DME as needed.  Pt will demonstrate understanding of education provided regarding energy conservation and task modification through teach-back method.                          History:     Past Medical History:   Diagnosis Date    Cancer          Past Surgical History:   Procedure Laterality Date    APPENDECTOMY      CHOLECYSTECTOMY      DISSECTION OF NECK Right 5/30/2019    Procedure: DISSECTION, NECK;  Surgeon: Wallace Santiago MD;  Location: [facility] OR [address] [place] [address];  Service: ENT;  Laterality: Right;    ESOPHAGOGASTRODUODENOSCOPY W/ PEG  5/30/2019    Procedure: EGD, WITH PEG TUBE INSERTION;  Surgeon: Ottoniel Jimenez MD;  Location: [facility] OR [address] [place] [address];  Service: General;;    FLAP PROCEDURE Right 5/30/2019    Procedure: CREATION, FREE FLAP;  Surgeon: Navdeep Dangelo MD;  Location: [address];  Service: Plastics;  Laterality: Right;  ischemia time 3593-5043  flap rt lower leg to rt neck    FLAP PROCEDURE Right 9/28/2022    Procedure: CREATION, FREE FLAP;  Surgeon: Milly Price MD;  Location: [address];  Service: ENT;  Laterality: Right;    HYSTERECTOMY      MANDIBLE SURGERY      SURGICAL REMOVAL OF NEOPLASM OF MOUTH Right 9/28/2022    Procedure: EXCISION, NEOPLASM, MOUTH;  Surgeon: Wallace Santiago MD;  Location: [facility] OR [address] [place] [address];  Service: ENT;  Laterality: Right;    TRACHEOTOMY N/A 5/30/2019    Procedure: TRACHEOTOMY;  Surgeon: Navdeep Dangelo MD;  [facility];  Service: Plastics;  Laterality: N/A;    TRACHEOTOMY N/A 9/28/2022    Procedure: TRACHEOTOMY;  Surgeon: Wallace Santiago MD;   Location: [facility] OR [address] [place] [address];  Service: ENT;  Laterality: N/A;       Time Tracking:       OT Date of Treatment: 09/29/22  OT Start Time: 1339  OT Stop Time: 1419  OT Total Time (min): 40 min  Additional staff present: PT  initially    Billable Minutes:Evaluation 10  Self Care/Home Management 30      9/29/2022

## 2022-09-29 NOTE — OP NOTE
Date of Service: 9/28/22    Pre-operative Diagnosis:   1. Oral cavity cancer   2. Open wound of oral cavity and pharynx    Post-operative Diagnosis:  Same status post reconstruction    Procedures Performed:  1. Right pectoralis major myocutaneous regional flap for coverage of oral cavity defect 11x 6 cm  2. Complex vestibuloplasty   3. Pharyngoplasty   4. Advancement flap for closure of secondary chest defect with advanced area being 20 x 22 cm    5. Tracheostomy tube exchange    Surgeon: Milly Price MD    Assistant(s):  MD Ameya Harrison MD    Anesthesia: General Endotracheal    EBL:  Approximately 100 cc for my portion of the procedure    Specimens:  None    Findings:  Patient had an approximately 11 x 6 cm defect involving the right oral tongue as well as the pharynx down to the vallecula.  A 12 x 7 cm pectoralis major myocutaneous flap was used for reconstruction from the right.  Portion of the left tongue was closed to the lip on the left and remainder of the right vestibule was recreated with pec flap.    Indications for Procedure:  Ms. 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 Santiago for resection of a new recurrence along the skin paddle of previous fibula free flap.  I was asked to assist with reconstruction.    Procedure in Detail:  After informed consent was obtained from patient in holding area the risks and benefits reviewed patient was taken back to OR 13.  Anesthesia proceeded with general endotracheal anesthesia which was later converted to anesthesia via tracheostomy.  Time-out was undertaken with verification of patient and correct procedure.  Head and neck and chest were prepped and draped in usual sterile fashion.  Dr. Santiago then proceeded with tracheostomy and resection portion please see his dictation for details.  After resection was done I took over with the reconstruction with a pec flap.      Given the  measured 11 x 6 cm defect of right oral tongue as well as pharynx I marked out a 12 x 7 cm skin paddle overlying the pectoralis major muscle with distal tip being just over last floating rib and lateral to the xiphoid.  Relaxing incision was also fashioned in a curvilinear line towards axilla.  Starting with Bovie cautery with cutting mode skin was incised along relaxing incision and posterior aspect of the skin paddle and this was continued with coag mode down to the fascia of the pectoralis major.  Elevation was done over the fascia laterally to the lateral edge of the pectoralis major.  Blunt dissection was undertaken between pectoralis major and minor with identification of the coracoacromial and pedicle of the flap.  I then proceeded with cutting through the skin of the medial skin paddle which was undermined away from the skin paddle down to the sternum.  [place] was deepened inferiorly with incision into the fascia over the rectus muscle which was tacked down with Vicryl to the subdermal layer of the skin paddle.  The flap was then elevated with Bovie cautery used to divide muscular attachments of the pectoralis to both the sternum and the ribs with care to control the mammary perforators.  This was elevated to the level of the 2nd and 3rd rib with detachment of pectoralis major attachments medially at the sternum.  Attention was then turned laterally to the humeral attachment and with care to protect the pedicle layer by layer division was undertaken of the muscle with harmonic focus gonzález.  Tunnel was then created over the pectoralis major to communicate with subplatysmal flap in the neck and after ensuring adequate rotation and reach pectoralis myocutaneous flap was brought in through the tunnel and into the neck.  Distal part of the skin paddle was 1st close to the pharynx well the flap was in the neck with closure to the vallecula lateral pharyngeal wall and base of tongue and pharyngoplasty.  This was  done with horizontal mattress 3-0 Vicryl suture.  The flap was then passed through to the oral cavity and in order to protect previous fibula bone and hardware muscular portion of the flap was tacked down with Vicryl over the bone.  The skin was then inset from the palate to the vestibule with recreation of the vestibule with 3-0 Vicryl horizontal mattress sutures between the flap and the lip.  On the left side the tongue was closed primarily to the lip.  This completed the inset of the flap and vestibuloplasty.    Attention was turned to the secondary defect at the right chest wall.  After wide undermining bilateral advancement flaps were created.  Of note undermined area in order to obtain closure was 20 x 22 cm Care was taken to realign breast tissue with nipple alignment to the contralateral side and advancement flaps were closed over 2x 15 Syriac drains with 1 drain placed laterally and 1 drain placed medially.  Advancement flaps were closed with deep 3-0 Vicryl and staples for the skin.    The neck was then closed over also a 15 Syriac drain that was placed under the pectoralis pedicle with deep 3-0 Vicryl and staples for the skin.  At this point after all wounds were closed endotracheal tube was exchanged for a 6 Shiley cuffed trach and patient was awakened and taken to recovery in stable condition.      Complications:  None    Attestation:  I was present for the entire procedure    DISCLAIMER: This note was prepared with [employer] voice recognition transcription software. Garbled syntax, mangled pronouns, and other bizarre constructions may be attributed to that software system. While efforts were made to correct any mistakes made by this voice recognition program, some errors and/or omissions may remain in the note that were missed when the note was originally created.

## 2022-09-29 NOTE — CONSULTS
[facility] - Surgical Intensive Care  Adult Nutrition  Consult Note    SUMMARY     Recommendations    1) Recommend Batsheva Frank 1.4 @ 50ml/hr   -Provides 1680 kcals, 74g Protein, 864 ml fl     2) ADAT to regular diet     3) RD to follow    Goals: Meet % EEN by next RD f/u date  Nutrition Goal Status: new  Communication of RD Recs: other (comment) (POC)    Assessment and Plan  Nutrition Problem  Inadequate oral intake    Related to (etiology):   Inability to consume sufficient energy    Signs and Symptoms (as evidenced by):   NPO, S/P ORAL SURGERY     Interventions/Recommendations (treatment strategy):  Collaborate with other provides  EN    Nutrition Diagnosis Status:   New      Reason for Assessment    Reason For Assessment: consult  Diagnosis: cancer diagnosis/related complications  Relevant Medical History: squamous cell carcinoma of the oral cavity, GERD  Interdisciplinary Rounds: did not attend  General Information Comments: Pt seen for consult s/p revision tracheostomy, right partial glossectomy with resection of floor of mouth and right buccal mucosa,and PEG placement (9/28) Pt is expected to have prolonged dysphagia. No TF running during assessment. Pt can communicated w/ board or nods currently.  at bedside. Pt denies N/V/D/C, unsure of last BM.  states she was eating less than her needs at home due to needing soft food she could mush with her tongue. # for the past few years, endorses fluctuating weight, however weight appears stable. Pt's at risk for malnutrion. NFPE not warranted at this time. Rd following.  Nutrition Discharge Planning: adequate intake    Nutrition Risk Screen    Nutrition Risk Screen: dysphagia or difficulty swallowing, tube feeding or parenteral nutrition    Nutrition/Diet History    Patient Reported Diet/Restrictions/Preferences: general  Typical Food/Fluid Intake: soft foods  Spiritual, Cultural Beliefs, Sikhism Practices, Values that Affect Care:  "no  Supplemental Drinks or Food Habits: Premier Protein  Food Allergies: NKFA  Factors Affecting Nutritional Intake: chewing difficulties/inability to chew food, pain    Anthropometrics    Temp: 97.3 °F (36.3 °C)  Height Method: Measured  Height: 5' 2.99" (160 cm)  Height (inches): 62.99 in  Weight Method: Stated  Weight: 56.2 kg (124 lb)  Weight (lb): 124 lb  Ideal Body Weight (IBW), Female: 114.95 lb  % Ideal Body Weight, Female (lb): 107.87 %  BMI (Calculated): 22  BMI Grade: 18.5-24.9 - normal  Usual Body Weight (UBW), k.36 kg  % Usual Body Weight: 100.01  % Weight Change From Usual Weight: -0.2 %       Lab/Procedures/Meds    Pertinent Labs Reviewed: reviewed  Pertinent Labs Comments: H/H:9.4/29.5, MCHC:31.9, chloride:111, glucose:119, calcium:7.8  Pertinent Medications Reviewed: reviewed  Pertinent Medications Comments: enoxaparin, pantoprazole      Estimated/Assessed Needs    Weight Used For Calorie Calculations: 56.2 kg (123 lb 14.4 oz)  Energy Calorie Requirements (kcal): 1686  Energy Need Method: Kcal/kg (30kcals/kg)  Protein Requirements: 67-74 (1.2-1.4g/kg)  Weight Used For Protein Calculations: 56.2 kg (123 lb 14.4 oz)        RDA Method (mL): 1686         Nutrition Prescription Ordered    Current Diet Order: NPO    Evaluation of Received Nutrient/Fluid Intake    % Kcal Needs: 0  % Protein Needs: 0  I/O: +4075.4  % Intake of Estimated Energy Needs: 0 - 25 %  % Meal Intake: NPO    Nutrition Risk    Level of Risk/Frequency of Follow-up: low - moderate       Monitor and Evaluation    Food and Nutrient Intake: enteral nutrition intake  Food and Nutrient Adminstration: enteral and parenteral nutrition administration  Knowledge/Beliefs/Attitudes: beliefs and attitudes, food and nutrition knowledge/skill  Physical Activity and Function: nutrition-related ADLs and IADLs  Anthropometric Measurements: height/length, weight, weight change, body mass index  Biochemical Data, Medical Tests and Procedures: " electrolyte and renal panel, gastrointestinal profile, glucose/endocrine profile, inflammatory profile, lipid profile  Nutrition-Focused Physical Findings: overall appearance       Nutrition Follow-Up    RD Follow-up?: Yes

## 2022-09-30 LAB
ANION GAP SERPL CALC-SCNC: 4 MMOL/L (ref 8–16)
ANION GAP SERPL CALC-SCNC: 6 MMOL/L (ref 8–16)
BUN SERPL-MCNC: 10 MG/DL (ref 8–23)
BUN SERPL-MCNC: 9 MG/DL (ref 8–23)
CALCIUM SERPL-MCNC: 7.7 MG/DL (ref 8.7–10.5)
CALCIUM SERPL-MCNC: 7.9 MG/DL (ref 8.7–10.5)
CHLORIDE SERPL-SCNC: 103 MMOL/L (ref 95–110)
CHLORIDE SERPL-SCNC: 104 MMOL/L (ref 95–110)
CO2 SERPL-SCNC: 21 MMOL/L (ref 23–29)
CO2 SERPL-SCNC: 23 MMOL/L (ref 23–29)
CREAT SERPL-MCNC: 0.6 MG/DL (ref 0.5–1.4)
CREAT SERPL-MCNC: 0.7 MG/DL (ref 0.5–1.4)
ERYTHROCYTE [DISTWIDTH] IN BLOOD BY AUTOMATED COUNT: 13.1 % (ref 11.5–14.5)
EST. GFR  (NO RACE VARIABLE): >60 ML/MIN/1.73 M^2
EST. GFR  (NO RACE VARIABLE): >60 ML/MIN/1.73 M^2
GLUCOSE SERPL-MCNC: 104 MG/DL (ref 70–110)
GLUCOSE SERPL-MCNC: 97 MG/DL (ref 70–110)
HCT VFR BLD AUTO: 25.2 % (ref 37–48.5)
HGB BLD-MCNC: 8.1 G/DL (ref 12–16)
MAGNESIUM SERPL-MCNC: 1.8 MG/DL (ref 1.6–2.6)
MAGNESIUM SERPL-MCNC: 1.8 MG/DL (ref 1.6–2.6)
MCH RBC QN AUTO: 31.2 PG (ref 27–31)
MCHC RBC AUTO-ENTMCNC: 32.1 G/DL (ref 32–36)
MCV RBC AUTO: 97 FL (ref 82–98)
PHOSPHATE SERPL-MCNC: 2.4 MG/DL (ref 2.7–4.5)
PHOSPHATE SERPL-MCNC: 2.6 MG/DL (ref 2.7–4.5)
PLATELET # BLD AUTO: 121 K/UL (ref 150–450)
PMV BLD AUTO: 10.7 FL (ref 9.2–12.9)
POCT GLUCOSE: 105 MG/DL (ref 70–110)
POCT GLUCOSE: 107 MG/DL (ref 70–110)
POCT GLUCOSE: 116 MG/DL (ref 70–110)
POCT GLUCOSE: 118 MG/DL (ref 70–110)
POTASSIUM SERPL-SCNC: 3.7 MMOL/L (ref 3.5–5.1)
POTASSIUM SERPL-SCNC: 3.8 MMOL/L (ref 3.5–5.1)
RBC # BLD AUTO: 2.6 M/UL (ref 4–5.4)
SODIUM SERPL-SCNC: 130 MMOL/L (ref 136–145)
SODIUM SERPL-SCNC: 131 MMOL/L (ref 136–145)
WBC # BLD AUTO: 6.53 K/UL (ref 3.9–12.7)

## 2022-09-30 PROCEDURE — 20000000 HC ICU ROOM

## 2022-09-30 PROCEDURE — 84100 ASSAY OF PHOSPHORUS: CPT | Mod: 91 | Performed by: STUDENT IN AN ORGANIZED HEALTH CARE EDUCATION/TRAINING PROGRAM

## 2022-09-30 PROCEDURE — 27000221 HC OXYGEN, UP TO 24 HOURS

## 2022-09-30 PROCEDURE — 25000003 PHARM REV CODE 250: Performed by: STUDENT IN AN ORGANIZED HEALTH CARE EDUCATION/TRAINING PROGRAM

## 2022-09-30 PROCEDURE — 84100 ASSAY OF PHOSPHORUS: CPT | Performed by: STUDENT IN AN ORGANIZED HEALTH CARE EDUCATION/TRAINING PROGRAM

## 2022-09-30 PROCEDURE — 94761 N-INVAS EAR/PLS OXIMETRY MLT: CPT

## 2022-09-30 PROCEDURE — 85027 COMPLETE CBC AUTOMATED: CPT | Performed by: STUDENT IN AN ORGANIZED HEALTH CARE EDUCATION/TRAINING PROGRAM

## 2022-09-30 PROCEDURE — 99900035 HC TECH TIME PER 15 MIN (STAT)

## 2022-09-30 PROCEDURE — 25000003 PHARM REV CODE 250: Performed by: OTOLARYNGOLOGY

## 2022-09-30 PROCEDURE — 63600175 PHARM REV CODE 636 W HCPCS: Performed by: STUDENT IN AN ORGANIZED HEALTH CARE EDUCATION/TRAINING PROGRAM

## 2022-09-30 PROCEDURE — 94640 AIRWAY INHALATION TREATMENT: CPT

## 2022-09-30 PROCEDURE — 25000242 PHARM REV CODE 250 ALT 637 W/ HCPCS: Performed by: STUDENT IN AN ORGANIZED HEALTH CARE EDUCATION/TRAINING PROGRAM

## 2022-09-30 PROCEDURE — 83735 ASSAY OF MAGNESIUM: CPT | Performed by: STUDENT IN AN ORGANIZED HEALTH CARE EDUCATION/TRAINING PROGRAM

## 2022-09-30 PROCEDURE — 80048 BASIC METABOLIC PNL TOTAL CA: CPT | Mod: 91 | Performed by: STUDENT IN AN ORGANIZED HEALTH CARE EDUCATION/TRAINING PROGRAM

## 2022-09-30 PROCEDURE — 27200966 HC CLOSED SUCTION SYSTEM

## 2022-09-30 PROCEDURE — 80048 BASIC METABOLIC PNL TOTAL CA: CPT | Performed by: STUDENT IN AN ORGANIZED HEALTH CARE EDUCATION/TRAINING PROGRAM

## 2022-09-30 PROCEDURE — 83735 ASSAY OF MAGNESIUM: CPT | Mod: 91 | Performed by: STUDENT IN AN ORGANIZED HEALTH CARE EDUCATION/TRAINING PROGRAM

## 2022-09-30 PROCEDURE — 99900026 HC AIRWAY MAINTENANCE (STAT)

## 2022-09-30 RX ORDER — HYDROCODONE BITARTRATE AND ACETAMINOPHEN 7.5; 325 MG/15ML; MG/15ML
10 SOLUTION ORAL EVERY 4 HOURS PRN
Status: DISCONTINUED | OUTPATIENT
Start: 2022-09-30 | End: 2022-10-06 | Stop reason: HOSPADM

## 2022-09-30 RX ORDER — TRIPROLIDINE/PSEUDOEPHEDRINE 2.5MG-60MG
400 TABLET ORAL EVERY 6 HOURS PRN
Status: DISCONTINUED | OUTPATIENT
Start: 2022-09-30 | End: 2022-10-06 | Stop reason: HOSPADM

## 2022-09-30 RX ORDER — SODIUM,POTASSIUM PHOSPHATES 280-250MG
2 POWDER IN PACKET (EA) ORAL ONCE
Status: COMPLETED | OUTPATIENT
Start: 2022-09-30 | End: 2022-09-30

## 2022-09-30 RX ADMIN — SODIUM CHLORIDE SOLN NEBU 3% 4 ML: 3 NEBU SOLN at 08:09

## 2022-09-30 RX ADMIN — ACETAMINOPHEN 650 MG: 325 TABLET ORAL at 05:09

## 2022-09-30 RX ADMIN — ENOXAPARIN SODIUM 40 MG: 100 INJECTION SUBCUTANEOUS at 05:09

## 2022-09-30 RX ADMIN — BACITRACIN: 500 OINTMENT TOPICAL at 10:09

## 2022-09-30 RX ADMIN — POTASSIUM & SODIUM PHOSPHATES POWDER PACK 280-160-250 MG 2 PACKET: 280-160-250 PACK at 08:09

## 2022-09-30 RX ADMIN — ACETAMINOPHEN 650 MG: 325 TABLET ORAL at 11:09

## 2022-09-30 RX ADMIN — HYDROCODONE BITARTRATE AND ACETAMINOPHEN 10 ML: 7.5; 325 SOLUTION ORAL at 11:09

## 2022-09-30 RX ADMIN — PANTOPRAZOLE SODIUM 40 MG: 40 GRANULE, DELAYED RELEASE ORAL at 08:09

## 2022-09-30 RX ADMIN — BACITRACIN: 500 OINTMENT TOPICAL at 08:09

## 2022-09-30 RX ADMIN — POTASSIUM BICARBONATE 20 MEQ: 391 TABLET, EFFERVESCENT ORAL at 08:09

## 2022-09-30 RX ADMIN — MUPIROCIN 1 G: 20 OINTMENT TOPICAL at 08:09

## 2022-09-30 RX ADMIN — GUAIFENESIN 200 MG: 100 SOLUTION ORAL at 04:09

## 2022-09-30 RX ADMIN — MAGNESIUM SULFATE 2 G: 2 INJECTION INTRAVENOUS at 06:09

## 2022-09-30 RX ADMIN — MUPIROCIN 1 G: 20 OINTMENT TOPICAL at 10:09

## 2022-09-30 NOTE — ASSESSMENT & PLAN NOTE
Recurrent SCC of OC s/p composite resection and Pec flap reconstruction on 9/28    - Fresh trach care   - trach collar with humidified O2  - NPO  - advance TFs   - daily cbc, bmp  - lovenox   - PT/OT    Disposition: stepdown to [facility]

## 2022-09-30 NOTE — SUBJECTIVE & OBJECTIVE
Interval History: no issues overnight       Objective:     Vital Signs (24h Range):  Temp:  [96.4 °F (35.8 °C)-98.8 °F (37.1 °C)] 98.2 °F (36.8 °C)  Pulse:  [] 67  Resp:  [11-30] 18  SpO2:  [92 %-100 %] 96 %  BP: ()/(37-64) 98/49  Arterial Line BP: (101-129)/(37-45) 129/45       Physical Exam  Awake, follows commands, responds appropriately  Face symmetric  Intra-oral flap appears perfused and viable, suture lines intact  Neck incision approximated with staples  Neck is soft, no fluid collections, Right RENETTA drain with sanguinous output  6-0 cuffed tracheostomy tube, cuff down  Chest incision approximated with staples, Jpx2 in place with sanguinous output  Abdomen soft, nt, G tube in place   Telephone Encounter by Anabel Evangelista RN at 08/06/18 12:55 PM     Author:  Anabel Evangelista RN Service:  (none) Author Type:  Registered Nurse     Filed:  08/06/18 12:56 PM Encounter Date:  7/25/2018 Status:  Signed     :  Anabel Evangelista RN (Registered Nurse)            Order entered.[KL1.1M] Patient notified.[KL1.1T]        Revision History        User Key Date/Time User Provider Type Action    > KL1.1 08/06/18 12:56 PM Anabel Evangelista, RN Registered Nurse Sign    M - Manual, T - Template

## 2022-09-30 NOTE — PROGRESS NOTES
[facility] - [facility]  Critical Care - Surgery  Progress Note    Patient Name: Jennifer Andre  MRN: [id number]  Admission Date: 9/28/2022  Hospital Length of Stay: 2 days  Code Status: Full Code  Attending Provider: Wallace Santiago MD  Primary Care Provider: Marcello Benavidez MD   Principal Problem: Malignant neoplasm of oral cavity    Subjective:     Hospital/ICU Course:  No notes on file    Interval History/Significant Events: NAEON. AFVSS. Denies any complaints this morning. Drains with minimal SS output.     Follow-up For: Procedure(s) (LRB):  EXCISION, NEOPLASM, MOUTH (Right)  TRACHEOTOMY (N/A)  CREATION, FREE FLAP (Right)  INSERTION, PEG TUBE (N/A)    Post-Operative Day: 2 Days Post-Op    Objective:     Vital Signs (Most Recent):  Temp: 98.2 °F (36.8 °C) (09/30/22 0300)  Pulse: 67 (09/30/22 0630)  Resp: 18 (09/30/22 0630)  BP: (!) 98/49 (09/30/22 0630)  SpO2: 96 % (09/30/22 0630)   Vital Signs (24h Range):  Temp:  [96.4 °F (35.8 °C)-98.8 °F (37.1 °C)] 98.2 °F (36.8 °C)  Pulse:  [] 67  Resp:  [11-30] 18  SpO2:  [92 %-100 %] 96 %  BP: ()/(37-64) 98/49  Arterial Line BP: (101-129)/(37-45) 129/45     Weight: 56.2 kg (124 lb)  Body mass index is 21.97 kg/m².      Intake/Output Summary (Last 24 hours) at 9/30/2022 0637  Last data filed at 9/30/2022 0600  Gross per 24 hour   Intake 2287.64 ml   Output 1745 ml   Net 542.64 ml       Physical Exam  Constitutional:       Comments: Alert, oriented, comfortable.    HENT:      Head:      Comments: Changes noted consistent with prior left mandibular surgery  Eyes:      Pupils: Pupils are equal, round, and reactive to light.   Neck:      Comments: Neck drain in place with SS drainage  Staple line CDI, bulge noted secondary to pec flap  Tracheostomy with trach collar  Cardiovascular:      Rate and Rhythm: Normal rate and regular rhythm.      Pulses: Normal pulses.   Pulmonary:      Effort: Pulmonary effort is normal.      Breath sounds: Normal breath  sounds.   Abdominal:      General: Abdomen is flat. Bowel sounds are normal.      Palpations: Abdomen is soft.      Comments: PEG tube in place with bilious drainage   Genitourinary:  Comments: Purewick in place   Musculoskeletal:      Comments: Left chest staple line CDI      Left hand swelling at IV site   Skin:     Comments: 2 chest drains in place with SS drainage    Vents:  Oxygen Concentration (%): 28 (09/30/22 0600)    Lines/Drains/Airways       Drain  Duration                  Gastrostomy/Enterostomy 05/30/19 1140 Percutaneous endoscopic gastrostomy (PEG) LUQ feeding 1218 days         Closed/Suction Drain 09/28/22 1227 Right Chest Bulb 15 Fr. 1 day         Closed/Suction Drain 09/28/22 1228 Right Chest Bulb 15 Fr. 1 day         Closed/Suction Drain 09/28/22 1320 Right Neck Bulb 10 Fr. 1 day         Gastrostomy/Enterostomy 09/28/22 0944 Gastrostomy tube w/ balloon midline feeding 1 day         Urethral Catheter 09/28/22 0915 Temperature probe;Non-latex 14 Fr. 1 day    Female External Urinary Catheter 09/30/22 0608 <1 day              Airway  Duration                  Surgical Airway 06/06/19 0800 Shiley Uncuffed 1211 days         Airway - Non-Surgical 09/28/22 0917 1 day         Surgical Airway 09/28/22 1302 Shiley Cuffed 1 day              Peripheral Intravenous Line  Duration                  Peripheral IV - Single Lumen 09/28/22 1118 18 G Right Ankle 1 day                    Significant Labs:    CBC/Anemia Profile:  Recent Labs   Lab 09/28/22  1439 09/29/22  0254 09/30/22  0242   WBC 12.22 11.69 6.53   HGB 11.0* 9.4* 8.1*   HCT 33.6* 29.5* 25.2*    148* 121*   MCV 96 97 97   RDW 12.8 12.8 13.1        Chemistries:  Recent Labs   Lab 09/28/22  1439 09/29/22  0254 09/30/22  0242    136 130*   K 4.2 4.1 3.8   * 111* 103   CO2 18* 20* 21*   BUN 7* 9 9   CREATININE 0.6 0.6 0.6   CALCIUM 7.4* 7.8* 7.7*   ALBUMIN 2.9*  --   --    PROT 5.5*  --   --    BILITOT 0.3  --   --    ALKPHOS 69  --   --     ALT 9*  --   --    AST 16  --   --    MG  --  1.7 1.8   PHOS  --  2.9 2.6*       All pertinent labs within the past 24 hours have been reviewed.    Significant Imaging:  I have reviewed all pertinent imaging results/findings within the past 24 hours.  Assessment/Plan:     Squamous cell carcinoma    Neuro/Psych:   -- Sedation: None  -- Pain: Tylenol, norco, ibuprofen PRN             Cards:   -- HDS  -- CTM      Pulm:   -- Goal O2 sat > 90%  -- On trach collar s/p recent trach  -- Wean as able  -- Mucus Secretions:   - Mucinex              - Hypertonic saline      Renal:  -- Purewick  -- BUN/Cr 9/0.6      FEN / GI:   -- Net 4.5L  -- Replace lytes as needed  -- Nutrition: tube feeds   --advance to goal 50 ml/hr  -- G tube ok for meds  -- Tube feeds 9/29  -- Hyponatremia   -Likely dilutional   -D/C fluids      ID:   -- Tm: afebrile; WBC 6.53  -- Abx: None indicated      Heme/Onc:   -- H/H stable 8.1/25.2  -- Daily CBC      Endo:   -- Gluc goal 140-180  -- SSI      PPx:   Feeding: NPO  Analgesia/Sedation: As above  Thromboembolic prevention: Held in acute setting  HOB >30: Yes  Stress Ulcer ppx: Pantoprazole (home med)  Glucose control: Critical care goal 140-180 g/dl, ISS     Lines/Drains/Airway:  3 PIV, PEG, G-tube, 2 chest drains, 1 neck drain, trach      Dispo/Code Status/Palliative:   -- Stepdown to [facility]               Critical care was time spent personally by me on the following activities: development of treatment plan with patient or surrogate and bedside caregivers, discussions with consultants, evaluation of patient's response to treatment, examination of patient, ordering and performing treatments and interventions, ordering and review of laboratory studies, ordering and review of radiographic studies, pulse oximetry, re-evaluation of patient's condition.  This critical care time did not overlap with that of any other provider or involve time for any procedures.     Luis F Wiley MD  Critical Care -  Surgery  [facility] - Surgical Intensive Care

## 2022-09-30 NOTE — SUBJECTIVE & OBJECTIVE
Interval History/Significant Events: YEIMI. AFVSS. Denies any complaints this morning. Drains with minimal SS output.     Follow-up For: Procedure(s) (LRB):  EXCISION, NEOPLASM, MOUTH (Right)  TRACHEOTOMY (N/A)  CREATION, FREE FLAP (Right)  INSERTION, PEG TUBE (N/A)    Post-Operative Day: 2 Days Post-Op    Objective:     Vital Signs (Most Recent):  Temp: 98.2 °F (36.8 °C) (09/30/22 0300)  Pulse: 67 (09/30/22 0630)  Resp: 18 (09/30/22 0630)  BP: (!) 98/49 (09/30/22 0630)  SpO2: 96 % (09/30/22 0630)   Vital Signs (24h Range):  Temp:  [96.4 °F (35.8 °C)-98.8 °F (37.1 °C)] 98.2 °F (36.8 °C)  Pulse:  [] 67  Resp:  [11-30] 18  SpO2:  [92 %-100 %] 96 %  BP: ()/(37-64) 98/49  Arterial Line BP: (101-129)/(37-45) 129/45     Weight: 56.2 kg (124 lb)  Body mass index is 21.97 kg/m².      Intake/Output Summary (Last 24 hours) at 9/30/2022 0637  Last data filed at 9/30/2022 0600  Gross per 24 hour   Intake 2287.64 ml   Output 1745 ml   Net 542.64 ml       Physical Exam  Constitutional:       Comments: Alert, oriented, comfortable.    HENT:      Head:      Comments: Changes noted consistent with prior left mandibular surgery  Eyes:      Pupils: Pupils are equal, round, and reactive to light.   Neck:      Comments: Neck drain in place with SS drainage  Staple line CDI, bulge noted secondary to pec flap  Tracheostomy with trach collar  Cardiovascular:      Rate and Rhythm: Normal rate and regular rhythm.      Pulses: Normal pulses.   Pulmonary:      Effort: Pulmonary effort is normal.      Breath sounds: Normal breath sounds.   Abdominal:      General: Abdomen is flat. Bowel sounds are normal.      Palpations: Abdomen is soft.      Comments: PEG tube in place with bilious drainage   Genitourinary:  Comments: Purewick in place   Musculoskeletal:      Comments: Left chest staple line CDI      Left hand swelling at IV site   Skin:     Comments: 2 chest drains in place with SS drainage    Vents:  Oxygen Concentration (%): 28  (09/30/22 0600)    Lines/Drains/Airways       Drain  Duration                  Gastrostomy/Enterostomy 05/30/19 1140 Percutaneous endoscopic gastrostomy (PEG) LUQ feeding 1218 days         Closed/Suction Drain 09/28/22 1227 Right Chest Bulb 15 Fr. 1 day         Closed/Suction Drain 09/28/22 1228 Right Chest Bulb 15 Fr. 1 day         Closed/Suction Drain 09/28/22 1320 Right Neck Bulb 10 Fr. 1 day         Gastrostomy/Enterostomy 09/28/22 0944 Gastrostomy tube w/ balloon midline feeding 1 day         Urethral Catheter 09/28/22 0915 Temperature probe;Non-latex 14 Fr. 1 day    Female External Urinary Catheter 09/30/22 0608 <1 day              Airway  Duration                  Surgical Airway 06/06/19 0800 Shiley Uncuffed 1211 days         Airway - Non-Surgical 09/28/22 0917 1 day         Surgical Airway 09/28/22 1302 Shiley Cuffed 1 day              Peripheral Intravenous Line  Duration                  Peripheral IV - Single Lumen 09/28/22 1118 18 G Right Ankle 1 day                    Significant Labs:    CBC/Anemia Profile:  Recent Labs   Lab 09/28/22  1439 09/29/22  0254 09/30/22  0242   WBC 12.22 11.69 6.53   HGB 11.0* 9.4* 8.1*   HCT 33.6* 29.5* 25.2*    148* 121*   MCV 96 97 97   RDW 12.8 12.8 13.1        Chemistries:  Recent Labs   Lab 09/28/22  1439 09/29/22  0254 09/30/22  0242    136 130*   K 4.2 4.1 3.8   * 111* 103   CO2 18* 20* 21*   BUN 7* 9 9   CREATININE 0.6 0.6 0.6   CALCIUM 7.4* 7.8* 7.7*   ALBUMIN 2.9*  --   --    PROT 5.5*  --   --    BILITOT 0.3  --   --    ALKPHOS 69  --   --    ALT 9*  --   --    AST 16  --   --    MG  --  1.7 1.8   PHOS  --  2.9 2.6*       All pertinent labs within the past 24 hours have been reviewed.    Significant Imaging:  I have reviewed all pertinent imaging results/findings within the past 24 hours.

## 2022-09-30 NOTE — ASSESSMENT & PLAN NOTE
  Neuro/Psych:   -- Sedation: None  -- Pain: Tylenol, norco, ibuprofen PRN             Cards:   -- HDS  -- CTM      Pulm:   -- Goal O2 sat > 90%  -- On trach collar s/p recent trach  -- Wean as able  -- Mucus Secretions:   - Mucinex              - Hypertonic saline      Renal:  -- Purewick  -- BUN/Cr 9/0.6      FEN / GI:   -- Net 4.5L  -- Replace lytes as needed  -- Nutrition: tube feeds   --advance to goal 50 ml/hr  -- G tube ok for meds  -- Tube feeds 9/29  -- Hyponatremia   -Likely dilutional   -D/C fluids      ID:   -- Tm: afebrile; WBC 6.53  -- Abx: None indicated      Heme/Onc:   -- H/H stable 8.1/25.2  -- Daily CBC      Endo:   -- Gluc goal 140-180  -- SSI      PPx:   Feeding: NPO  Analgesia/Sedation: As above  Thromboembolic prevention: Held in acute setting  HOB >30: Yes  Stress Ulcer ppx: Pantoprazole (home med)  Glucose control: Critical care goal 140-180 g/dl, ISS     Lines/Drains/Airway:  3 PIV, PEG, G-tube, 2 chest drains, 1 neck drain, trach      Dispo/Code Status/Palliative:   -- Stepdown to [facility]

## 2022-09-30 NOTE — PROGRESS NOTES
[facility] - [facility]  Otorhinolaryngology-Head & Neck Surgery  Progress Note    Subjective:     Interval History: no issues overnight     Objective:     Vital Signs (24h Range):  Temp:  [96.4 °F (35.8 °C)-98.8 °F (37.1 °C)] 98.2 °F (36.8 °C)  Pulse:  [] 67  Resp:  [11-30] 18  SpO2:  [92 %-100 %] 96 %  BP: ()/(37-64) 98/49  Arterial Line BP: (101-129)/(37-45) 129/45     Physical Exam  Awake, follows commands, responds appropriately  Face symmetric  Intra-oral flap appears perfused and viable, suture lines intact  Neck incision approximated with staples  Neck is soft, no fluid collections, Right RENETTA drain with sanguinous output  6-0 cuffed tracheostomy tube, cuff down  Chest incision approximated with staples, Jpx2 in place with sanguinous output  Abdomen soft, nt, G tube in place    Assessment/Plan:     * Malignant neoplasm of oral cavity  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

## 2022-10-01 LAB
ANION GAP SERPL CALC-SCNC: 7 MMOL/L (ref 8–16)
BUN SERPL-MCNC: 10 MG/DL (ref 8–23)
CALCIUM SERPL-MCNC: 8.2 MG/DL (ref 8.7–10.5)
CHLORIDE SERPL-SCNC: 106 MMOL/L (ref 95–110)
CO2 SERPL-SCNC: 20 MMOL/L (ref 23–29)
CREAT SERPL-MCNC: 0.6 MG/DL (ref 0.5–1.4)
ERYTHROCYTE [DISTWIDTH] IN BLOOD BY AUTOMATED COUNT: 12.8 % (ref 11.5–14.5)
EST. GFR  (NO RACE VARIABLE): >60 ML/MIN/1.73 M^2
GLUCOSE SERPL-MCNC: 125 MG/DL (ref 70–110)
HCT VFR BLD AUTO: 26 % (ref 37–48.5)
HGB BLD-MCNC: 8.5 G/DL (ref 12–16)
MCH RBC QN AUTO: 30.9 PG (ref 27–31)
MCHC RBC AUTO-ENTMCNC: 32.7 G/DL (ref 32–36)
MCV RBC AUTO: 95 FL (ref 82–98)
PLATELET # BLD AUTO: 135 K/UL (ref 150–450)
PMV BLD AUTO: 11 FL (ref 9.2–12.9)
POCT GLUCOSE: 106 MG/DL (ref 70–110)
POCT GLUCOSE: 114 MG/DL (ref 70–110)
POTASSIUM SERPL-SCNC: 3.8 MMOL/L (ref 3.5–5.1)
RBC # BLD AUTO: 2.75 M/UL (ref 4–5.4)
SODIUM SERPL-SCNC: 133 MMOL/L (ref 136–145)
WBC # BLD AUTO: 8.99 K/UL (ref 3.9–12.7)

## 2022-10-01 PROCEDURE — 25000003 PHARM REV CODE 250: Performed by: STUDENT IN AN ORGANIZED HEALTH CARE EDUCATION/TRAINING PROGRAM

## 2022-10-01 PROCEDURE — 27200966 HC CLOSED SUCTION SYSTEM

## 2022-10-01 PROCEDURE — 99900035 HC TECH TIME PER 15 MIN (STAT)

## 2022-10-01 PROCEDURE — 27000221 HC OXYGEN, UP TO 24 HOURS

## 2022-10-01 PROCEDURE — 99900026 HC AIRWAY MAINTENANCE (STAT)

## 2022-10-01 PROCEDURE — 63600175 PHARM REV CODE 636 W HCPCS: Performed by: STUDENT IN AN ORGANIZED HEALTH CARE EDUCATION/TRAINING PROGRAM

## 2022-10-01 PROCEDURE — 94761 N-INVAS EAR/PLS OXIMETRY MLT: CPT

## 2022-10-01 PROCEDURE — 25000003 PHARM REV CODE 250: Performed by: OTOLARYNGOLOGY

## 2022-10-01 PROCEDURE — 85027 COMPLETE CBC AUTOMATED: CPT | Performed by: STUDENT IN AN ORGANIZED HEALTH CARE EDUCATION/TRAINING PROGRAM

## 2022-10-01 PROCEDURE — 20600001 HC STEP DOWN PRIVATE ROOM

## 2022-10-01 PROCEDURE — 80048 BASIC METABOLIC PNL TOTAL CA: CPT | Performed by: STUDENT IN AN ORGANIZED HEALTH CARE EDUCATION/TRAINING PROGRAM

## 2022-10-01 RX ADMIN — BACITRACIN: 500 OINTMENT TOPICAL at 03:10

## 2022-10-01 RX ADMIN — BACITRACIN: 500 OINTMENT TOPICAL at 09:10

## 2022-10-01 RX ADMIN — HYDROCODONE BITARTRATE AND ACETAMINOPHEN 10 ML: 7.5; 325 SOLUTION ORAL at 07:10

## 2022-10-01 RX ADMIN — ACETAMINOPHEN 650 MG: 325 TABLET ORAL at 05:10

## 2022-10-01 RX ADMIN — ENOXAPARIN SODIUM 40 MG: 100 INJECTION SUBCUTANEOUS at 05:10

## 2022-10-01 RX ADMIN — ACETAMINOPHEN 650 MG: 325 TABLET ORAL at 11:10

## 2022-10-01 RX ADMIN — HYDROCODONE BITARTRATE AND ACETAMINOPHEN 10 ML: 7.5; 325 SOLUTION ORAL at 12:10

## 2022-10-01 RX ADMIN — PANTOPRAZOLE SODIUM 40 MG: 40 GRANULE, DELAYED RELEASE ORAL at 09:10

## 2022-10-01 RX ADMIN — MUPIROCIN 1 G: 20 OINTMENT TOPICAL at 09:10

## 2022-10-01 RX ADMIN — ACETAMINOPHEN 650 MG: 325 TABLET ORAL at 12:10

## 2022-10-01 NOTE — PLAN OF CARE
Patient transfer from SICU around 1645    POC reviewed with patient who verbalized understanding. VSS on 5L trach collar. AAOX4. Remains free of falls and injury. Patient up to chair    - #6 Shiley cuffed  - R chest incision w/staples  - neck incision w/staples  - R neck RENETTA w/serosanguinous drainage  - 2 x R R chest RENETTA w/serosanguinous drainage  - R chest flap  - G tube, tolerating TF at goal of 55 ml/h  - Blood glucose being monitored every shift    NPO, denies nausea. Denies pain. Telemetry being monitored running NSR. Patient denies chest pain & SOB. No acute events. No distress noted. Bed in lowest position, call light within reach, frequent rounds made for safety.     [facility]     Problem: Adult Inpatient Plan of Care  Goal: Plan of Care Review  Outcome: Ongoing, Progressing  Goal: Patient-Specific Goal (Individualized)  Outcome: Ongoing, Progressing  Goal: Absence of Hospital-Acquired Illness or Injury  Outcome: Ongoing, Progressing  Goal: Optimal Comfort and Wellbeing  Outcome: Ongoing, Progressing     Problem: Infection  Goal: Absence of Infection Signs and Symptoms  Outcome: Ongoing, Progressing     Problem: Skin Injury Risk Increased  Goal: Skin Health and Integrity  Outcome: Ongoing, Progressing     Problem: Fall Injury Risk  Goal: Absence of Fall and Fall-Related Injury  Outcome: Ongoing, Progressing

## 2022-10-01 NOTE — ASSESSMENT & PLAN NOTE
  Neuro/Psych:   -- Sedation: None  -- Pain: Tylenol, norco, ibuprofen PRN. Well-controlled.             Cards:   -- HDS  -- CTM      Pulm:   -- Goal O2 sat > 90%  -- On trach collar s/p recent trach  -- Wean as able  -- Mucus Secretions:   - Mucinex              - Hypertonic saline      Renal:  -- Purewick  -- BUN/Cr 10/0.6      FEN / GI:   -- Net 5.48L (daily +863 ml)  -- Replace lytes as needed  -- Nutrition: tube feeds   --advance to goal 50 ml/hr  -- G tube ok for meds  -- Tube feeds 9/29      ID:   -- Tm: afebrile; WBC 9  -- Abx: None indicated      Heme/Onc:   -- H/H stable 8.5/26  -- Daily CBC      Endo:   -- Gluc goal 140-180  -- SSI      PPx:   Feeding: Tube feeds (Isosource 1.5 -- goal 55 ml/hr)  Analgesia/Sedation: As above  Thromboembolic prevention: Enoxaparin 40mg QD  HOB >30: Yes  Stress Ulcer ppx: Pantoprazole 40mg QD (home med)  Glucose control: Critical care goal 140-180 g/dl, ISS     Lines/Drains/Airway:  3 PIV, PEG, G-tube, 2 chest drains, 1 neck drain, trach      Dispo/Code Status/Palliative:   -- Stepdown to [facility]

## 2022-10-01 NOTE — SUBJECTIVE & OBJECTIVE
Interval History: NAEON. Intermittent anxiety from trach. Breathing well.     Medications:  Continuous Infusions:  Scheduled Meds:   acetaminophen  650 mg Per G Tube Q6H    bacitracin   Topical (Top) TID    enoxaparin  40 mg Subcutaneous Daily    mupirocin  1 g Nasal BID    pantoprazole  40 mg Per G Tube Daily     PRN Meds:albuterol-ipratropium, calcium gluconate IVPB, calcium gluconate IVPB, calcium gluconate IVPB, cloNIDine, dextrose 10%, dextrose 10%, glucagon (human recombinant), guaiFENesin 100 mg/5 ml, hydrocodone-apap 7.5-325 MG/15 ML, ibuprofen, insulin aspart U-100, lactulose, magnesium sulfate IVPB, magnesium sulfate IVPB, ondansetron, potassium chloride **AND** potassium chloride **AND** potassium chloride, prochlorperazine, sodium chloride 0.9%, sodium chloride 3%, sodium phosphate IVPB, sodium phosphate IVPB, sodium phosphate IVPB     Review of patient's allergies indicates:   Allergen Reactions    Bactrim [sulfamethoxazole-trimethoprim]     Clindamycin     Keflex [cephalexin]      Tolerated Unasyn 05/31/2019    Tramadol      DIZZY AND NAUSEA, & VOMITTING     Objective:     Vital Signs (24h Range):  Temp:  [98.4 °F (36.9 °C)-98.8 °F (37.1 °C)] 98.4 °F (36.9 °C)  Pulse:  [61-88] 66  Resp:  [15-23] 16  SpO2:  [92 %-100 %] 98 %  BP: ()/(44-63) 99/51       Lines/Drains/Airways       Drain  Duration                  Gastrostomy/Enterostomy 05/30/19 1140 Percutaneous endoscopic gastrostomy (PEG) LUQ feeding 1219 days         Closed/Suction Drain 09/28/22 1227 Right Chest Bulb 15 Fr. 2 days         Closed/Suction Drain 09/28/22 1228 Right Chest Bulb 15 Fr. 2 days         Closed/Suction Drain 09/28/22 1320 Right Neck Bulb 10 Fr. 2 days         Gastrostomy/Enterostomy 09/28/22 0944 Gastrostomy tube w/ balloon midline feeding 2 days    Female External Urinary Catheter 09/30/22 0608 1 day              Airway  Duration                  Surgical Airway 06/06/19 0800 Shiley Uncuffed 1212 days         Airway -  Non-Surgical 09/28/22 0917 2 days         Surgical Airway 09/28/22 1302 Shiley Cuffed 2 days              Peripheral Intravenous Line  Duration                  Peripheral IV - Single Lumen 09/28/22 1118 18 G Right Ankle 2 days         Peripheral IV - Single Lumen 09/30/22 1019 20 G;1 3/4 in Left Forearm <1 day                    Physical Exam  Awake, follows commands, responds appropriately  Face symmetric  Intra-oral flap appears perfused and viable, suture lines intact  Neck incision approximated with staples  Neck is soft, no fluid collections, Right RENETTA drain with sanguinous output  6-0 cuffed tracheostomy tube, cuff down  Chest incision approximated with staples, Jpx2 in place with sanguinous output  Abdomen soft, nt, G tube in place    Significant Labs:  BMP:   Recent Labs   Lab 09/30/22  0619 10/01/22  0331    125*    106   CO2 23 20*   BUN 10 10   CREATININE 0.7 0.6   CALCIUM 7.9* 8.2*   MG 1.8  --      CBC:   Recent Labs   Lab 10/01/22  0331   WBC 8.99   RBC 2.75*   HGB 8.5*   HCT 26.0*   *   MCV 95   MCH 30.9   MCHC 32.7       Significant Diagnostics:  I have reviewed and interpreted all pertinent imaging results/findings within the past 24 hours.

## 2022-10-01 NOTE — NURSING TRANSFER
Nursing Transfer Note      10/1/2022     Reason patient is being transferred: Stepdown    [facility]    Transfer via wheelchair    Transfer with cardiac monitoring    Transported by Nurse and PCT    Medicines sent: Bacitracin    Chart send with patient: Yes    Notified: daughter    Patient reassessed at:  (10/1/22, 1640)    Upon arrival to floor: cardiac monitor applied, patient oriented to room, call bell in reach, and bed in lowest position.    Bedside handoff provided to SUSANNE Vega

## 2022-10-01 NOTE — PROGRESS NOTES
[facility] - Surgical Intensive Care  Otorhinolaryngology-Head & Neck Surgery  Progress Note    Subjective:     Post-Op Info:  Procedure(s) (LRB):  EXCISION, NEOPLASM, MOUTH (Right)  TRACHEOTOMY (N/A)  CREATION, FREE FLAP (Right)  INSERTION, PEG TUBE (N/A)   3 Days Post-Op  Hospital Day: 4     Interval History: NAEON. Intermittent anxiety from trach. Breathing well.     Medications:  Continuous Infusions:  Scheduled Meds:   acetaminophen  650 mg Per G Tube Q6H    bacitracin   Topical (Top) TID    enoxaparin  40 mg Subcutaneous Daily    mupirocin  1 g Nasal BID    pantoprazole  40 mg Per G Tube Daily     PRN Meds:albuterol-ipratropium, calcium gluconate IVPB, calcium gluconate IVPB, calcium gluconate IVPB, cloNIDine, dextrose 10%, dextrose 10%, glucagon (human recombinant), guaiFENesin 100 mg/5 ml, hydrocodone-apap 7.5-325 MG/15 ML, ibuprofen, insulin aspart U-100, lactulose, magnesium sulfate IVPB, magnesium sulfate IVPB, ondansetron, potassium chloride **AND** potassium chloride **AND** potassium chloride, prochlorperazine, sodium chloride 0.9%, sodium chloride 3%, sodium phosphate IVPB, sodium phosphate IVPB, sodium phosphate IVPB     Review of patient's allergies indicates:   Allergen Reactions    Bactrim [sulfamethoxazole-trimethoprim]     Clindamycin     Keflex [cephalexin]      Tolerated Unasyn 05/31/2019    Tramadol      DIZZY AND NAUSEA, & VOMITTING     Objective:     Vital Signs (24h Range):  Temp:  [98.4 °F (36.9 °C)-98.8 °F (37.1 °C)] 98.4 °F (36.9 °C)  Pulse:  [61-88] 66  Resp:  [15-23] 16  SpO2:  [92 %-100 %] 98 %  BP: ()/(44-63) 99/51       Lines/Drains/Airways       Drain  Duration                  Gastrostomy/Enterostomy 05/30/19 1140 Percutaneous endoscopic gastrostomy (PEG) LUQ feeding 1219 days         Closed/Suction Drain 09/28/22 1227 Right Chest Bulb 15 Fr. 2 days         Closed/Suction Drain 09/28/22 1228 Right Chest Bulb 15 Fr. 2 days         Closed/Suction Drain 09/28/22 2614  Right Neck Bulb 10 Fr. 2 days         Gastrostomy/Enterostomy 09/28/22 0944 Gastrostomy tube w/ balloon midline feeding 2 days    Female External Urinary Catheter 09/30/22 0608 1 day              Airway  Duration                  Surgical Airway 06/06/19 0800 Shiley Uncuffed 1212 days         Airway - Non-Surgical 09/28/22 0917 2 days         Surgical Airway 09/28/22 1302 Shiley Cuffed 2 days              Peripheral Intravenous Line  Duration                  Peripheral IV - Single Lumen 09/28/22 1118 18 G Right Ankle 2 days         Peripheral IV - Single Lumen 09/30/22 1019 20 G;1 3/4 in Left Forearm <1 day                    Physical Exam  Awake, follows commands, responds appropriately  Face symmetric  Intra-oral flap appears perfused and viable, suture lines intact  Neck incision approximated with staples  Neck is soft, no fluid collections, Right RENETTA drain with sanguinous output  6-0 cuffed tracheostomy tube, cuff down  Chest incision approximated with staples, Jpx2 in place with sanguinous output  Abdomen soft, nt, G tube in place    Significant Labs:  BMP:   Recent Labs   Lab 09/30/22  0619 10/01/22  0331    125*    106   CO2 23 20*   BUN 10 10   CREATININE 0.7 0.6   CALCIUM 7.9* 8.2*   MG 1.8  --      CBC:   Recent Labs   Lab 10/01/22  0331   WBC 8.99   RBC 2.75*   HGB 8.5*   HCT 26.0*   *   MCV 95   MCH 30.9   MCHC 32.7       Significant Diagnostics:  I have reviewed and interpreted all pertinent imaging results/findings within the past 24 hours.    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

## 2022-10-01 NOTE — ASSESSMENT & PLAN NOTE
Recurrent SCC of OC s/p composite resection and Pec flap reconstruction on 9/28    - Fresh trach care   - trach collar with humidified O2  - NPO  - advance TFs   - daily cbc, bmp  - lovenox   - Will change trach on POD 5 (Monday)  - PT/OT    Disposition: stepdown to [facility]

## 2022-10-01 NOTE — PLAN OF CARE
"      SICU PLAN OF CARE NOTE    SHIFT EVENTS:  VSS / No acute events this shift. POC reviewed with patient and family; questions and concerns addressed. See flow sheets for full assessment details. Will continue to monitor patient closely.      Dx: Malignant neoplasm of oral cavity    Vital Signs: BP (!) 122/57   Pulse 78   Temp 98.7 °F (37.1 °C) (Axillary)   Resp 20   Ht 5' 2.99" (1.6 m)   Wt 56.2 kg (124 lb)   SpO2 97%   Breastfeeding No   BMI 21.97 kg/m²     Neuro: AAO x4, Follows Commands, and Moves All Extremities    Respiratory: Room Air    Cardiac: NSR    Diet: NPO and Tube Feeds    Urine Output: Purewick    Drains:   3 RENETTA drains     Accuchecks: q6H.    SKIN NOTE:  Skin: Refer to LDAs    Skin precautions maintained including:      "

## 2022-10-01 NOTE — PROGRESS NOTES
[facility]  Critical Care - Surgery  Progress Note    Patient Name: Jennifer Andre  MRN: [id number]  Admission Date: 9/28/2022  Hospital Length of Stay: 3 days  Code Status: Full Code  Attending Provider: Wallace Santiago MD  Primary Care Provider: Marcello Benavidez MD   Principal Problem: Malignant neoplasm of oral cavity    Subjective:     Hospital/ICU Course:  No notes on file    Interval History/Significant Events: NAEON. Remains afebrile and HDS. No complaints this morning. Drains with minimal output. Ready for stepdown to the floor.    Follow-up For: Procedure(s) (LRB):  EXCISION, NEOPLASM, MOUTH (Right)  TRACHEOTOMY (N/A)  CREATION, FREE FLAP (Right)  INSERTION, PEG TUBE (N/A)    Post-Operative Day: 3 Days Post-Op    Objective:     Vital Signs (Most Recent):  Temp: 98.4 °F (36.9 °C) (10/01/22 0301)  Pulse: 66 (10/01/22 0500)  Resp: 16 (10/01/22 0500)  BP: (!) 99/51 (10/01/22 0500)  SpO2: 98 % (10/01/22 0500)   Vital Signs (24h Range):  Temp:  [98.4 °F (36.9 °C)-98.8 °F (37.1 °C)] 98.4 °F (36.9 °C)  Pulse:  [61-88] 66  Resp:  [15-23] 16  SpO2:  [92 %-100 %] 98 %  BP: ()/(44-63) 99/51     Weight: 56.2 kg (124 lb)  Body mass index is 21.97 kg/m².      Intake/Output Summary (Last 24 hours) at 10/1/2022 0640  Last data filed at 10/1/2022 0600  Gross per 24 hour   Intake 1548.58 ml   Output 685 ml   Net 863.58 ml       Physical Exam  Vitals and nursing note reviewed.   Constitutional:       General: She is not in acute distress.  HENT:      Head:      Comments: Changes noted consistent with prior left mandibular surgery  Eyes:      Extraocular Movements: Extraocular movements intact.      Conjunctiva/sclera: Conjunctivae normal.   Neck:      Comments: Neck drain in place with SS drainage  Staple line CDI, bulge noted secondary to pec flap  Tracheostomy with trach collar  Cardiovascular:      Rate and Rhythm: Normal rate and regular rhythm.   Pulmonary:      Effort: Pulmonary effort is  normal.      Breath sounds: Normal breath sounds.   Abdominal:      General: Abdomen is flat. Bowel sounds are normal. There is no distension.      Palpations: Abdomen is soft.      Comments: PEG tube in place   Genitourinary:     Comments: Purewick in place  Musculoskeletal:      Comments: Left chest staple line CDI    Skin:     Comments:  2 chest drains in place with minimal SS drainage   Neurological:      Mental Status: She is alert and easily aroused.       Vents:  Oxygen Concentration (%): 28 (10/01/22 0500)    Lines/Drains/Airways       Drain  Duration                  Gastrostomy/Enterostomy 05/30/19 1140 Percutaneous endoscopic gastrostomy (PEG) LUQ feeding 1219 days         Closed/Suction Drain 09/28/22 1227 Right Chest Bulb 15 Fr. 2 days         Closed/Suction Drain 09/28/22 1228 Right Chest Bulb 15 Fr. 2 days         Closed/Suction Drain 09/28/22 1320 Right Neck Bulb 10 Fr. 2 days         Gastrostomy/Enterostomy 09/28/22 0944 Gastrostomy tube w/ balloon midline feeding 2 days    Female External Urinary Catheter 09/30/22 0608 1 day              Airway  Duration                  Surgical Airway 06/06/19 0800 Shiley Uncuffed 1212 days         Airway - Non-Surgical 09/28/22 0917 2 days         Surgical Airway 09/28/22 1302 Shiley Cuffed 2 days              Peripheral Intravenous Line  Duration                  Peripheral IV - Single Lumen 09/28/22 1118 18 G Right Ankle 2 days         Peripheral IV - Single Lumen 09/30/22 1019 20 G;1 3/4 in Left Forearm <1 day                    Significant Labs:    CBC/Anemia Profile:  Recent Labs   Lab 09/30/22  0242 10/01/22  0331   WBC 6.53 8.99   HGB 8.1* 8.5*   HCT 25.2* 26.0*   * 135*   MCV 97 95   RDW 13.1 12.8        Chemistries:  Recent Labs   Lab 09/30/22  0242 09/30/22  0619 10/01/22  0331   * 131* 133*   K 3.8 3.7 3.8    104 106   CO2 21* 23 20*   BUN 9 10 10   CREATININE 0.6 0.7 0.6   CALCIUM 7.7* 7.9* 8.2*   MG 1.8 1.8  --    PHOS 2.6* 2.4*   --        All pertinent labs within the past 24 hours have been reviewed.    Significant Imaging:  I have reviewed all pertinent imaging results/findings within the past 24 hours.    Assessment/Plan:     Squamous cell carcinoma    Neuro/Psych:   -- Sedation: None  -- Pain: Tylenol, norco, ibuprofen PRN. Well-controlled.             Cards:   -- HDS  -- CTM      Pulm:   -- Goal O2 sat > 90%  -- On trach collar s/p recent trach  -- Wean as able  -- Mucus Secretions:   - Mucinex              - Hypertonic saline      Renal:  -- Purewick  -- BUN/Cr 10/0.6      FEN / GI:   -- Net 5.48L (daily +863 ml)  -- Replace lytes as needed  -- Nutrition: tube feeds   --advance to goal 50 ml/hr  -- G tube ok for meds  -- Tube feeds 9/29      ID:   -- Tm: afebrile; WBC 9  -- Abx: None indicated      Heme/Onc:   -- H/H stable 8.5/26  -- Daily CBC      Endo:   -- Gluc goal 140-180  -- SSI      PPx:   Feeding: Tube feeds (Isosource 1.5 -- goal 55 ml/hr)  Analgesia/Sedation: As above  Thromboembolic prevention: Enoxaparin 40mg QD  HOB >30: Yes  Stress Ulcer ppx: Pantoprazole 40mg QD (home med)  Glucose control: Critical care goal 140-180 g/dl, ISS     Lines/Drains/Airway:  3 PIV, PEG, G-tube, 2 chest drains, 1 neck drain, trach      Dispo/Code Status/Palliative:   -- Stepdown to [facility]             Critical Care Daily Checklist:    A: Awake: RASS Goal/Actual Goal: RASS Goal: 0-->alert and calm  Actual: Gann Agitation Sedation Scale (RASS): Alert and calm   B: Spontaneous Breathing Trial Performed?     C: SAT & SBT Coordinated?  N/A                  D: Delirium: CAM-ICU Overall CAM-ICU: Negative   E: Early Mobility Performed? Yes   F: Feeding Goal: Goals: Meet % EEN by next RD f/u date  Status: Nutrition Goal Status: new   Current Diet Order   Procedures    Diet NPO      AS: Analgesia/Sedation Scheduled acetaminophen  Ibuprofen, hydrocodone prn   T: Thromboembolic Prophylaxis Enoxaparin   H: HOB > 300 Yes   U: Stress  Ulcer Prophylaxis (if needed) Pantoprazole   G: Glucose Control SSI   B: Bowel Function     I: Indwelling Catheter (Lines & Andersen) Necessity As above   D: De-escalation of Antimicrobials/Pharmacotherapies N/A    Plan for the day/ETD Stepdown    Code Status:  Family/Goals of Care: Full Code        Critical care was time spent personally by me on the following activities: development of treatment plan with patient or surrogate and bedside caregivers, discussions with consultants, evaluation of patient's response to treatment, examination of patient, ordering and performing treatments and interventions, ordering and review of laboratory studies, ordering and review of radiographic studies, pulse oximetry, re-evaluation of patient's condition.  This critical care time did not overlap with that of any other provider or involve time for any procedures.     Cristopher Cho MD  Critical Care - Surgery  [facility] - [facility]

## 2022-10-02 LAB
ANION GAP SERPL CALC-SCNC: 6 MMOL/L (ref 8–16)
BUN SERPL-MCNC: 13 MG/DL (ref 8–23)
CALCIUM SERPL-MCNC: 8.5 MG/DL (ref 8.7–10.5)
CHLORIDE SERPL-SCNC: 106 MMOL/L (ref 95–110)
CO2 SERPL-SCNC: 24 MMOL/L (ref 23–29)
CREAT SERPL-MCNC: 0.6 MG/DL (ref 0.5–1.4)
ERYTHROCYTE [DISTWIDTH] IN BLOOD BY AUTOMATED COUNT: 12.8 % (ref 11.5–14.5)
EST. GFR  (NO RACE VARIABLE): >60 ML/MIN/1.73 M^2
GLUCOSE SERPL-MCNC: 107 MG/DL (ref 70–110)
HCT VFR BLD AUTO: 24.9 % (ref 37–48.5)
HGB BLD-MCNC: 8.2 G/DL (ref 12–16)
MCH RBC QN AUTO: 31.7 PG (ref 27–31)
MCHC RBC AUTO-ENTMCNC: 32.9 G/DL (ref 32–36)
MCV RBC AUTO: 96 FL (ref 82–98)
PLATELET # BLD AUTO: 145 K/UL (ref 150–450)
PMV BLD AUTO: 10.7 FL (ref 9.2–12.9)
POCT GLUCOSE: 135 MG/DL (ref 70–110)
POCT GLUCOSE: 137 MG/DL (ref 70–110)
POTASSIUM SERPL-SCNC: 3.8 MMOL/L (ref 3.5–5.1)
RBC # BLD AUTO: 2.59 M/UL (ref 4–5.4)
SODIUM SERPL-SCNC: 136 MMOL/L (ref 136–145)
WBC # BLD AUTO: 7.21 K/UL (ref 3.9–12.7)

## 2022-10-02 PROCEDURE — 85027 COMPLETE CBC AUTOMATED: CPT | Performed by: STUDENT IN AN ORGANIZED HEALTH CARE EDUCATION/TRAINING PROGRAM

## 2022-10-02 PROCEDURE — 27000221 HC OXYGEN, UP TO 24 HOURS

## 2022-10-02 PROCEDURE — 20600001 HC STEP DOWN PRIVATE ROOM

## 2022-10-02 PROCEDURE — 94761 N-INVAS EAR/PLS OXIMETRY MLT: CPT

## 2022-10-02 PROCEDURE — 80048 BASIC METABOLIC PNL TOTAL CA: CPT | Performed by: STUDENT IN AN ORGANIZED HEALTH CARE EDUCATION/TRAINING PROGRAM

## 2022-10-02 PROCEDURE — 99900035 HC TECH TIME PER 15 MIN (STAT)

## 2022-10-02 PROCEDURE — C1751 CATH, INF, PER/CENT/MIDLINE: HCPCS

## 2022-10-02 PROCEDURE — 99900026 HC AIRWAY MAINTENANCE (STAT)

## 2022-10-02 PROCEDURE — 25000003 PHARM REV CODE 250: Performed by: OTOLARYNGOLOGY

## 2022-10-02 PROCEDURE — 25000003 PHARM REV CODE 250: Performed by: STUDENT IN AN ORGANIZED HEALTH CARE EDUCATION/TRAINING PROGRAM

## 2022-10-02 PROCEDURE — 36410 VNPNXR 3YR/> PHY/QHP DX/THER: CPT

## 2022-10-02 PROCEDURE — 63600175 PHARM REV CODE 636 W HCPCS: Performed by: STUDENT IN AN ORGANIZED HEALTH CARE EDUCATION/TRAINING PROGRAM

## 2022-10-02 PROCEDURE — 36415 COLL VENOUS BLD VENIPUNCTURE: CPT | Performed by: STUDENT IN AN ORGANIZED HEALTH CARE EDUCATION/TRAINING PROGRAM

## 2022-10-02 RX ADMIN — MUPIROCIN 1 G: 20 OINTMENT TOPICAL at 09:10

## 2022-10-02 RX ADMIN — PANTOPRAZOLE SODIUM 40 MG: 40 GRANULE, DELAYED RELEASE ORAL at 09:10

## 2022-10-02 RX ADMIN — BACITRACIN: 500 OINTMENT TOPICAL at 04:10

## 2022-10-02 RX ADMIN — HYDROCODONE BITARTRATE AND ACETAMINOPHEN 10 ML: 7.5; 325 SOLUTION ORAL at 05:10

## 2022-10-02 RX ADMIN — GUAIFENESIN 200 MG: 100 SOLUTION ORAL at 05:10

## 2022-10-02 RX ADMIN — BACITRACIN: 500 OINTMENT TOPICAL at 09:10

## 2022-10-02 RX ADMIN — GUAIFENESIN 200 MG: 100 SOLUTION ORAL at 11:10

## 2022-10-02 RX ADMIN — ENOXAPARIN SODIUM 40 MG: 100 INJECTION SUBCUTANEOUS at 04:10

## 2022-10-02 RX ADMIN — HYDROCODONE BITARTRATE AND ACETAMINOPHEN 10 ML: 7.5; 325 SOLUTION ORAL at 09:10

## 2022-10-02 NOTE — PLAN OF CARE
POC reviewed with patient and daughter who verbalized understanding. VSS on 5L 28% trach collar. AAOX4. Remains free of falls and injury. Patient up to chair    - L midline catheter inserted  - #6 Shiley cuffed  - R chest incision w/staples  - neck incision w/staples  - R neck RENETTA w/sanguinous drainage  - 2 x R R chest RENETTA w/sanguinous drainage  - R chest flap  - G tube, tolerating TF at goal of 55 ml/h  - Blood glucose being monitored every shift, no coverage needed  - up to BSC, adequate UO, no BM    NPO, denies nausea. Denies pain. Telemetry being monitored running NSR. Patient denies chest pain & SOB. No acute events. No distress noted. Bed in lowest position, call light within reach, frequent rounds made for safety.     [facility]     Problem: Adult Inpatient Plan of Care  Goal: Plan of Care Review  Outcome: Ongoing, Progressing  Goal: Patient-Specific Goal (Individualized)  Outcome: Ongoing, Progressing  Goal: Absence of Hospital-Acquired Illness or Injury  Outcome: Ongoing, Progressing  Goal: Optimal Comfort and Wellbeing  Outcome: Ongoing, Progressing     Problem: Infection  Goal: Absence of Infection Signs and Symptoms  Outcome: Ongoing, Progressing     Problem: Skin Injury Risk Increased  Goal: Skin Health and Integrity  Outcome: Ongoing, Progressing     Problem: Fall Injury Risk  Goal: Absence of Fall and Fall-Related Injury  Outcome: Ongoing, Progressing

## 2022-10-02 NOTE — ASSESSMENT & PLAN NOTE
Recurrent SCC of OC s/p composite resection and Pec flap reconstruction on 9/28    - Fresh trach care   - trach collar with humidified O2  - NPO  - tube feeds at goal   - daily cbc, bmp  - lovenox   - Will change trach on POD 5 (Monday)  - PT/OT    Disposition: pending trach change, possible decannulation

## 2022-10-02 NOTE — PROGRESS NOTES
[facility]  Otorhinolaryngology-Head & Neck Surgery  Progress Note    Subjective:     Post-Op Info:  Procedure(s) (LRB):  EXCISION, NEOPLASM, MOUTH (Right)  TRACHEOTOMY (N/A)  CREATION, FREE FLAP (Right)  INSERTION, PEG TUBE (N/A)   4 Days Post-Op  Hospital Day: 5     Interval History: NAEON. Stepped down to floor. Tolerating tube feeds.     Medications:  Continuous Infusions:  Scheduled Meds:   acetaminophen  650 mg Per G Tube Q6H    bacitracin   Topical (Top) TID    enoxaparin  40 mg Subcutaneous Daily    mupirocin  1 g Nasal BID    pantoprazole  40 mg Per G Tube Daily     PRN Meds:albuterol-ipratropium, calcium gluconate IVPB, calcium gluconate IVPB, calcium gluconate IVPB, cloNIDine, dextrose 10%, dextrose 10%, glucagon (human recombinant), guaiFENesin 100 mg/5 ml, hydrocodone-apap 7.5-325 MG/15 ML, ibuprofen, insulin aspart U-100, lactulose, magnesium sulfate IVPB, magnesium sulfate IVPB, ondansetron, potassium chloride **AND** potassium chloride **AND** potassium chloride, prochlorperazine, sodium chloride 0.9%, sodium chloride 3%, sodium phosphate IVPB, sodium phosphate IVPB, sodium phosphate IVPB     Review of patient's allergies indicates:   Allergen Reactions    Bactrim [sulfamethoxazole-trimethoprim]     Clindamycin     Keflex [cephalexin]      Tolerated Unasyn 05/31/2019    Tramadol      DIZZY AND NAUSEA, & VOMITTING     Objective:     Vital Signs (24h Range):  Temp:  [97.8 °F (36.6 °C)-100 °F (37.8 °C)] 97.8 °F (36.6 °C)  Pulse:  [] 85  Resp:  [15-36] 20  SpO2:  [93 %-99 %] 95 %  BP: (104-131)/(50-62) 131/61       Lines/Drains/Airways       Drain  Duration                  Gastrostomy/Enterostomy 05/30/19 1140 Percutaneous endoscopic gastrostomy (PEG) LUQ feeding 1220 days         Closed/Suction Drain 09/28/22 1227 Right Chest Bulb 15 Fr. 3 days         Closed/Suction Drain 09/28/22 1228 Right Chest Bulb 15 Fr. 3 days         Closed/Suction Drain 09/28/22 1320 Right Neck Bulb 10 Fr.  3 days         Gastrostomy/Enterostomy 09/28/22 0944 Gastrostomy tube w/ balloon midline feeding 3 days    Female External Urinary Catheter 09/30/22 0608 2 days              Airway  Duration                  Surgical Airway 06/06/19 0800 Shiley Uncuffed 1214 days         Airway - Non-Surgical 09/28/22 0917 3 days         Surgical Airway 09/28/22 1302 Shiley Cuffed 3 days              Peripheral Intravenous Line  Duration                  Peripheral IV - Single Lumen 09/30/22 1019 20 G;1 3/4 in Left Forearm 1 day                    Physical Exam  Awake, follows commands, responds appropriately  Face symmetric  Intra-oral flap appears perfused and viable, suture lines intact  Neck incision approximated with staples  Neck is soft, no fluid collections, Right RENETTA drain with sanguinous output  6-0 cuffed tracheostomy tube, cuff down  Chest incision approximated with staples, Jpx2 in place with sanguinous output  Abdomen soft, nt, G tube in place    Significant Labs:  BMP:   Recent Labs   Lab 09/30/22  0619 10/01/22  0331    125*    106   CO2 23 20*   BUN 10 10   CREATININE 0.7 0.6   CALCIUM 7.9* 8.2*   MG 1.8  --      CBC:   Recent Labs   Lab 10/01/22  0331   WBC 8.99   RBC 2.75*   HGB 8.5*   HCT 26.0*   *   MCV 95   MCH 30.9   MCHC 32.7       Significant Diagnostics:  None    Assessment/Plan:     * Malignant neoplasm of oral cavity  Recurrent SCC of OC s/p composite resection and Pec flap reconstruction on 9/28    - Fresh trach care   - trach collar with humidified O2  - NPO  - tube feeds at goal   - daily cbc, bmp  - lovenox   - Will change trach on POD 5 (Monday)  - PT/OT    Disposition: pending trach change, possible decannulation        Flex Escamilla MD  Otorhinolaryngology-Head & Neck Surgery  [facility]

## 2022-10-02 NOTE — PLAN OF CARE
END OF SHIFT NOTE  Pt rested well throughout shift, no distress at this time, no complaints, VSS, bed in lowest position, side rails up x2. Daughter remain at bedside, RENETTA drains emptied, daughter and pt refusing morning labs. Call light within reach, personal items within reach. SHAR Simmons RN         Problem: Adult Inpatient Plan of Care  Goal: Plan of Care Review  Outcome: Ongoing, Progressing  Goal: Patient-Specific Goal (Individualized)  Outcome: Ongoing, Progressing  Goal: Absence of Hospital-Acquired Illness or Injury  Outcome: Ongoing, Progressing  Goal: Optimal Comfort and Wellbeing  Outcome: Ongoing, Progressing  Goal: Readiness for Transition of Care  Outcome: Ongoing, Progressing     Problem: Infection  Goal: Absence of Infection Signs and Symptoms  Outcome: Ongoing, Progressing     Problem: Skin Injury Risk Increased  Goal: Skin Health and Integrity  Outcome: Ongoing, Progressing     Problem: Fall Injury Risk  Goal: Absence of Fall and Fall-Related Injury  Outcome: Ongoing, Progressing

## 2022-10-02 NOTE — SUBJECTIVE & OBJECTIVE
Interval History: NAEON. Stepped down to floor. Tolerating tube feeds.     Medications:  Continuous Infusions:  Scheduled Meds:   acetaminophen  650 mg Per G Tube Q6H    bacitracin   Topical (Top) TID    enoxaparin  40 mg Subcutaneous Daily    mupirocin  1 g Nasal BID    pantoprazole  40 mg Per G Tube Daily     PRN Meds:albuterol-ipratropium, calcium gluconate IVPB, calcium gluconate IVPB, calcium gluconate IVPB, cloNIDine, dextrose 10%, dextrose 10%, glucagon (human recombinant), guaiFENesin 100 mg/5 ml, hydrocodone-apap 7.5-325 MG/15 ML, ibuprofen, insulin aspart U-100, lactulose, magnesium sulfate IVPB, magnesium sulfate IVPB, ondansetron, potassium chloride **AND** potassium chloride **AND** potassium chloride, prochlorperazine, sodium chloride 0.9%, sodium chloride 3%, sodium phosphate IVPB, sodium phosphate IVPB, sodium phosphate IVPB     Review of patient's allergies indicates:   Allergen Reactions    Bactrim [sulfamethoxazole-trimethoprim]     Clindamycin     Keflex [cephalexin]      Tolerated Unasyn 05/31/2019    Tramadol      DIZZY AND NAUSEA, & VOMITTING     Objective:     Vital Signs (24h Range):  Temp:  [97.8 °F (36.6 °C)-100 °F (37.8 °C)] 97.8 °F (36.6 °C)  Pulse:  [] 85  Resp:  [15-36] 20  SpO2:  [93 %-99 %] 95 %  BP: (104-131)/(50-62) 131/61       Lines/Drains/Airways       Drain  Duration                  Gastrostomy/Enterostomy 05/30/19 1140 Percutaneous endoscopic gastrostomy (PEG) LUQ feeding 1220 days         Closed/Suction Drain 09/28/22 1227 Right Chest Bulb 15 Fr. 3 days         Closed/Suction Drain 09/28/22 1228 Right Chest Bulb 15 Fr. 3 days         Closed/Suction Drain 09/28/22 1320 Right Neck Bulb 10 Fr. 3 days         Gastrostomy/Enterostomy 09/28/22 0944 Gastrostomy tube w/ balloon midline feeding 3 days    Female External Urinary Catheter 09/30/22 0608 2 days              Airway  Duration                  Surgical Airway 06/06/19 0800 Shiley Uncuffed 1214 days         Airway -  Non-Surgical 09/28/22 0917 3 days         Surgical Airway 09/28/22 1302 Shiley Cuffed 3 days              Peripheral Intravenous Line  Duration                  Peripheral IV - Single Lumen 09/30/22 1019 20 G;1 3/4 in Left Forearm 1 day                    Physical Exam  Awake, follows commands, responds appropriately  Face symmetric  Intra-oral flap appears perfused and viable, suture lines intact  Neck incision approximated with staples  Neck is soft, no fluid collections, Right RENETTA drain with sanguinous output  6-0 cuffed tracheostomy tube, cuff down  Chest incision approximated with staples, Jpx2 in place with sanguinous output  Abdomen soft, nt, G tube in place    Significant Labs:  BMP:   Recent Labs   Lab 09/30/22  0619 10/01/22  0331    125*    106   CO2 23 20*   BUN 10 10   CREATININE 0.7 0.6   CALCIUM 7.9* 8.2*   MG 1.8  --      CBC:   Recent Labs   Lab 10/01/22  0331   WBC 8.99   RBC 2.75*   HGB 8.5*   HCT 26.0*   *   MCV 95   MCH 30.9   MCHC 32.7       Significant Diagnostics:  None

## 2022-10-02 NOTE — RESPIRATORY THERAPY
RAPID RESPONSE RESPIRATORY THERAPY PROACTIVE NOTE           Time of visit: 736     Code Status: Full Code   : 1955  Bed: 1062/1062 A:   MRN: [id number]  Time spent at the bedside: < 15 min    SITUATION    Evaluated patient for: LDA Check     BACKGROUND    Patient has a past medical history of Cancer.  Clinically Significant Surgical Hx: tracheostomy    24 Hours Vitals Range:  Temp:  [97.8 °F (36.6 °C)-100 °F (37.8 °C)]   Pulse:  []   Resp:  [16-25]   BP: (104-131)/(50-62)   SpO2:  [93 %-98 %]     Labs:    Recent Labs     22  0242 22  0619 10/01/22  0331   * 131* 133*   K 3.8 3.7 3.8    104 106   CO2 21* 23 20*   CREATININE 0.6 0.7 0.6   GLU 97 104 125*   PHOS 2.6* 2.4*  --    MG 1.8 1.8  --         No results for input(s): PH, PCO2, PO2, HCO3, POCSATURATED, BE in the last 72 hours.    ASSESSMENT/INTERVENTIONS  Pt asleep during visit, no respiratory distress noted on examination. Trach secure, visible secretions leaking from trach, RT aware and will clean during round. All supplies at bedside      Last VS   Temp: 97.8 °F (36.6 °C) (10/02 1114)  Pulse: 72 (10/02 1114)  Resp: 18 (10/02 1114)  BP: 121/58 (10/02 1114)  SpO2: 97 % (10/02 1114)      Extra trachs at bedside: 6.0 & 4.0 Shiley Cuffed  Level of Consciousness: Level of Consciousness (AVPU): alert  Respiratory Effort: Respiratory Effort: Normal Expansion/Accessory Muscle Usage: Expansion/Accessory Muscles/Retractions: no use of accessory muscles  All Lung Field Breath Sounds: All Lung Fields Breath Sounds: coarse  O2 Device/Concentration: trach collar 5L/28%  Surgical airway: Yes, Type: Shiley Size: 6, cuffed  Ambu at bedside: Ambu bag with the patient?: Yes, Adult Ambu     Active Orders   Respiratory Care    Inhalation Treatment Q4H PRN     Frequency: Q4H PRN     Number of Occurrences: Until Specified    Oxygen Continuous     Frequency: Continuous     Number of Occurrences: Until Specified     Order Comments: With  humidification       Order Questions:      Device type: Low flow      Device: Trach Collar      FiO2%: 28      Titrate O2 per Oxygen Titration Protocol: Yes      To maintain SpO2 goal of: >= 92%      Notify MD of: Inability to achieve desired SpO2    Pulse Oximetry Q4H     Frequency: Q4H     Number of Occurrences: Until Specified    Routine tracheostomy care     Frequency: BID     Number of Occurrences: Until Specified       RECOMMENDATIONS    We recommend: RRT Recs: Continue POC per primary team.      FOLLOW-UP    Please call back the Rapid Response RT, Humberto Coombs RRT at x [phone] for any questions or concerns.

## 2022-10-03 LAB
ANION GAP SERPL CALC-SCNC: 9 MMOL/L (ref 8–16)
BUN SERPL-MCNC: 16 MG/DL (ref 8–23)
CALCIUM SERPL-MCNC: 8.4 MG/DL (ref 8.7–10.5)
CHLORIDE SERPL-SCNC: 103 MMOL/L (ref 95–110)
CO2 SERPL-SCNC: 23 MMOL/L (ref 23–29)
CREAT SERPL-MCNC: 0.6 MG/DL (ref 0.5–1.4)
ERYTHROCYTE [DISTWIDTH] IN BLOOD BY AUTOMATED COUNT: 12.9 % (ref 11.5–14.5)
EST. GFR  (NO RACE VARIABLE): >60 ML/MIN/1.73 M^2
GLUCOSE SERPL-MCNC: 132 MG/DL (ref 70–110)
HCT VFR BLD AUTO: 23.8 % (ref 37–48.5)
HGB BLD-MCNC: 7.9 G/DL (ref 12–16)
MCH RBC QN AUTO: 31.6 PG (ref 27–31)
MCHC RBC AUTO-ENTMCNC: 33.2 G/DL (ref 32–36)
MCV RBC AUTO: 95 FL (ref 82–98)
PLATELET # BLD AUTO: 164 K/UL (ref 150–450)
PMV BLD AUTO: 10.9 FL (ref 9.2–12.9)
POCT GLUCOSE: 131 MG/DL (ref 70–110)
POTASSIUM SERPL-SCNC: 3.8 MMOL/L (ref 3.5–5.1)
RBC # BLD AUTO: 2.5 M/UL (ref 4–5.4)
SODIUM SERPL-SCNC: 135 MMOL/L (ref 136–145)
WBC # BLD AUTO: 6.04 K/UL (ref 3.9–12.7)

## 2022-10-03 PROCEDURE — 99900035 HC TECH TIME PER 15 MIN (STAT)

## 2022-10-03 PROCEDURE — 80048 BASIC METABOLIC PNL TOTAL CA: CPT | Performed by: STUDENT IN AN ORGANIZED HEALTH CARE EDUCATION/TRAINING PROGRAM

## 2022-10-03 PROCEDURE — 97530 THERAPEUTIC ACTIVITIES: CPT

## 2022-10-03 PROCEDURE — 25000003 PHARM REV CODE 250: Performed by: OTOLARYNGOLOGY

## 2022-10-03 PROCEDURE — 85027 COMPLETE CBC AUTOMATED: CPT | Performed by: STUDENT IN AN ORGANIZED HEALTH CARE EDUCATION/TRAINING PROGRAM

## 2022-10-03 PROCEDURE — 27000221 HC OXYGEN, UP TO 24 HOURS

## 2022-10-03 PROCEDURE — 25000003 PHARM REV CODE 250: Performed by: STUDENT IN AN ORGANIZED HEALTH CARE EDUCATION/TRAINING PROGRAM

## 2022-10-03 PROCEDURE — 94761 N-INVAS EAR/PLS OXIMETRY MLT: CPT

## 2022-10-03 PROCEDURE — 63600175 PHARM REV CODE 636 W HCPCS: Performed by: STUDENT IN AN ORGANIZED HEALTH CARE EDUCATION/TRAINING PROGRAM

## 2022-10-03 PROCEDURE — 99900026 HC AIRWAY MAINTENANCE (STAT)

## 2022-10-03 PROCEDURE — 97535 SELF CARE MNGMENT TRAINING: CPT

## 2022-10-03 PROCEDURE — 20600001 HC STEP DOWN PRIVATE ROOM

## 2022-10-03 RX ADMIN — BACITRACIN: 500 OINTMENT TOPICAL at 09:10

## 2022-10-03 RX ADMIN — HYDROCODONE BITARTRATE AND ACETAMINOPHEN 10 ML: 7.5; 325 SOLUTION ORAL at 09:10

## 2022-10-03 RX ADMIN — MUPIROCIN 1 G: 20 OINTMENT TOPICAL at 09:10

## 2022-10-03 RX ADMIN — GUAIFENESIN 200 MG: 100 SOLUTION ORAL at 09:10

## 2022-10-03 RX ADMIN — ENOXAPARIN SODIUM 40 MG: 100 INJECTION SUBCUTANEOUS at 05:10

## 2022-10-03 RX ADMIN — BACITRACIN: 500 OINTMENT TOPICAL at 03:10

## 2022-10-03 RX ADMIN — GUAIFENESIN 200 MG: 100 SOLUTION ORAL at 05:10

## 2022-10-03 RX ADMIN — PANTOPRAZOLE SODIUM 40 MG: 40 GRANULE, DELAYED RELEASE ORAL at 09:10

## 2022-10-03 RX ADMIN — HYDROCODONE BITARTRATE AND ACETAMINOPHEN 10 ML: 7.5; 325 SOLUTION ORAL at 05:10

## 2022-10-03 NOTE — PT/OT/SLP PROGRESS
"Occupational Therapy   Treatment and Discharge      Patient Name:  Jennifer Andre   MRN:  [id number]  Admit Date: 9/28/2022  Admitting Diagnosis:  Malignant neoplasm of oral cavity   Length of Stay: 5 days  Recent Surgery: Procedure(s) (LRB):  EXCISION, NEOPLASM, MOUTH (Right)  TRACHEOTOMY (N/A)  CREATION, FREE FLAP (Right)  INSERTION, PEG TUBE (N/A) 5 Days Post-Op    Recommendations:     Discharge Recommendations: home  Discharge Equipment Recommendations:  none  Barriers to discharge:  None    Plan:   Once medically stable, pt safe to dc home with no further OT needs anticipated.     Plan of Care Expires: 10/29/22  Plan of Care Reviewed with: patient    Assessment:   Jennifer Andre is a 67 y.o. female with a medical diagnosis of Malignant neoplasm of oral cavity.  She presents with the following performance deficits affecting function: impaired functional mobility, gait instability, impaired cardiopulmonary response to activity, pain.   Pt has met goals and is safe to dc home no further OT needs    Rehab Prognosis: Good; patient is safe to dc home when medically stable    Subjective   Communicated with: RN prior to session.  Patient found HOB elevated with oxygen, PEG Tube, peripheral IV, telemetry, RENETTA drain upon OT entry to room.    Patient: mouthing, "i'm bleeding from my mouth" - no acute bleeding noted, mild light blood at sutures, RN informed.     Pain/Comfort:  Pain Rating 1: 0/10 (pt expressed concerns about bleeding at mouth, no acute blood appreciated, light bleeding at sutures noted, RN informed)  Location - Orientation 1: generalized  Location 1: mouth  Pain Addressed 1: Pre-medicate for activity, Reposition, Distraction  Pain Rating Post-Intervention 1: 0/10    Objective:   Patient found with: oxygen, PEG Tube, peripheral IV, telemetry, RENETTA drain     General Precautions: Standard, Cardiac fall, respiratory   Orthopedic Precautions:N/A   Braces: N/A   Respiratory Status:   Trach to 5L  at 28%  Vitals: " "/60   Pulse 87   Temp 98 °F (36.7 °C)   Resp 18   Ht 5' 3" (1.6 m)   Wt 57.1 kg (125 lb 14.1 oz)   SpO2 95%   Breastfeeding No   BMI 22.30 kg/m²     Outcome Measures:  AMPA 6 Click ADL: 24    Cognition:   Oriented X 4 and Alert  Command following: follows two-step commands  Communication: clear/fluent, nonverbal 2* trach    Occupational Performance:  Bed Mobility:    Patient completed Rolling/Turning to Left with  independence  Patient completed Supine to Sit with independence on L side of bed  Scooting anteriorly to EOB to have both feet planted on floor: independence  Patient completed Sit to Supine with supervision on L side of bed    Functional Mobility/Transfers:  Static Sitting EOB: ind  Dynamic Sitting EOB: ind  Patient completed Sit <> Stand Transfer with supervision  with  no assistive device   Static Standing Balance: supervision no AD completing ADLs in standing at sink  Dynamic Standing Balance: supervision no AD ambulating within room and to bathroom to complete grooming in standing  Patient completed Bed <> Chair Transfer using Step Transfer technique with supervision with no assistive device    Activities of Daily Living:  Feeding:   NPO  PEG in place  Grooming: supervision in standing at sink  Upper Body Dressing: supervision in standing at EOB   Lower Body Dressing: modified independence set up seated EOB  Toileting: independence at toilet  level     AMPAC 6 Click ADL:  [facility] Total Score: 24    Treatment & Education:  -OT POC, safety during ADLs and mobility - pt informed of dc from OT services, pt verbalized agreement   -Education on energy conservation and task modification to maximize safety and independence  -Questions answered within OT scope of practice.      Patient left with bed in chair position with all lines intact, call button in reach, RN notified, and spouse present    GOALS:   Multidisciplinary Problems       Occupational Therapy Goals       Not on file              " Multidisciplinary Problems (Resolved)          Problem: Occupational Therapy    Goal Priority Disciplines Outcome Interventions   Occupational Therapy Goal   (Resolved)     OT, PT/OT Met    Description: Goals set on 9/29 with expiration date 10/20:  Patient will increase functional independence with ADLs by performing:    Supine <> Sit with [place]  Grooming while standing at sink with Mod [place].  UB Dressing with Mod [place].  LB Dressing with Mod [place].  Step transfer with Mod [place] with DME as needed.  Pt will demonstrate understanding of education provided regarding energy conservation and task modification through teach-back method.                          Time Tracking:     OT Date of Treatment: 10/03/22  OT Start Time: 1620  OT Stop Time: 1643  OT Total Time (min): 23 min  Additional staff present: N/A      Billable Minutes:Self Care/Home Management 10  Therapeutic Activity 13      10/3/2022

## 2022-10-03 NOTE — PLAN OF CARE
Problem: Occupational Therapy  Goal: Occupational Therapy Goal  Description: Goals set on 9/29 with expiration date 10/20:  Patient will increase functional independence with ADLs by performing:    Supine <> Sit with [place]  Grooming while standing at sink with Mod [place].  UB Dressing with Mod [place].  LB Dressing with Mod [place].  Step transfer with Mod [place] with DME as needed.  Pt will demonstrate understanding of education provided regarding energy conservation and task modification through teach-back method.     Outcome: Met       Once medically stable, pt safe to dc home with no further OT needs anticipated.

## 2022-10-03 NOTE — SUBJECTIVE & OBJECTIVE
Interval History: No issues overnight, no complaints this morning.          Objective:     Vital Signs (24h Range):  Temp:  [97 °F (36.1 °C)-98.3 °F (36.8 °C)] 98.2 °F (36.8 °C)  Pulse:  [72-96] 83  Resp:  [16-22] 17  SpO2:  [92 %-97 %] 92 %  BP: (105-138)/(53-65) 116/53       Physical Exam  Awake, follows commands, responds appropriately  Face symmetric  Intra-oral flap appears perfused and viable, suture lines intact  Neck incision approximated with staples  Neck is soft, no fluid collections, Right RENETTA drain with sanguinous output  6-0 cuffed tracheostomy tube, cuff down  Chest incision approximated with staples, Jpx2 in place with sanguinous output  Abdomen soft, nt, G tube in place    Significant Labs:  BMP:   Recent Labs   Lab 09/30/22  0619 10/01/22  0331 10/03/22  0355      < > 132*      < > 103   CO2 23   < > 23   BUN 10   < > 16   CREATININE 0.7   < > 0.6   CALCIUM 7.9*   < > 8.4*   MG 1.8  --   --     < > = values in this interval not displayed.       CBC:   Recent Labs   Lab 10/03/22  0355   WBC 6.04   RBC 2.50*   HGB 7.9*   HCT 23.8*      MCV 95   MCH 31.6*   MCHC 33.2         Significant Diagnostics:  None

## 2022-10-03 NOTE — ASSESSMENT & PLAN NOTE
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

## 2022-10-03 NOTE — PROGRESS NOTES
[facility] - [facility]  Otorhinolaryngology-Head & Neck Surgery  Progress Note    Subjective:     Interval History: No issues overnight, no complaints this morning.      Objective:     Vital Signs (24h Range):  Temp:  [97 °F (36.1 °C)-98.3 °F (36.8 °C)] 98.2 °F (36.8 °C)  Pulse:  [72-96] 83  Resp:  [16-22] 17  SpO2:  [92 %-97 %] 92 %  BP: (105-138)/(53-65) 116/53     Physical Exam  Awake, follows commands, responds appropriately  Face symmetric  Intra-oral flap appears perfused and viable, suture lines intact  Neck incision approximated with staples  Neck is soft, no fluid collections, Right RENETTA drain with serosanguinous output  6-0 cuffed tracheostomy tube, cuff down  Chest incision approximated with staples, Jpx2 in place with serosanguinous output  Abdomen soft, nt, G tube in place    Significant Labs:  BMP:   Recent Labs   Lab 09/30/22  0619 10/01/22  0331 10/03/22  0355      < > 132*      < > 103   CO2 23   < > 23   BUN 10   < > 16   CREATININE 0.7   < > 0.6   CALCIUM 7.9*   < > 8.4*   MG 1.8  --   --     < > = values in this interval not displayed.     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

## 2022-10-03 NOTE — PT/OT/SLP PROGRESS
Physical Therapy Treatment    Patient Name:  Jennifer Andre   MRN:  [id number]    Recommendations:     Discharge Recommendations:  home health PT   Discharge Equipment Recommendations: none   Barriers to discharge: None    Assessment:     Jennifer Andre is a 67 y.o. female admitted with a medical diagnosis of Malignant neoplasm of oral cavity.  She presents with the following impairments/functional limitations:  weakness, impaired functional mobility, impaired endurance, gait instability, impaired cardiopulmonary response to activity. Patient is motivated to ambulate and progress with therapy. She performed sit to stand with supervision assistance. She ambulated 400' with no AD and stand by assistance, 2 standing rest breaks with good activity tolerance. She demo'd occasional scissoring of feet with gait and mild veering of path to the left, no loss of balance. She would benefit from HHPT to address the above deficits.     Rehab Prognosis: Good; patient would benefit from acute skilled PT services to address these deficits and reach maximum level of function.    Recent Surgery: Procedure(s) (LRB):  EXCISION, NEOPLASM, MOUTH (Right)  TRACHEOTOMY (N/A)  CREATION, FREE FLAP (Right)  INSERTION, PEG TUBE (N/A) 5 Days Post-Op    Plan:     During this hospitalization, patient to be seen 3 x/week to address the identified rehab impairments via gait training, therapeutic activities, therapeutic exercises and progress toward the following goals:    Plan of Care Expires:  10/29/22    Subjective     Chief Complaint: Denies pain or other complaints, endorses feeling cooped up in room  Patient/Family Comments/goals: ambulate with PT, return home and to PLOF  Pain/Comfort:  Pain Rating 1: 0/10      Objective:     Communicated with RN prior to session.  Patient found HOB elevated with telemetry, PEG Tube, Tracheostomy, RENETTA drain, oxygen upon PT entry to room.     General Precautions: Standard, fall, respiratory   Orthopedic  Precautions:N/A   Braces: N/A  Respiratory Status:  Trach collar, 5L     Functional Mobility:    Bed Mobility  Supine to Sit on the L side:  supervision assistance, HOB elevated   Transfers Sit to Stand:  supervision assistance from EOB   Gait  Gait Distance: 400 ft with no AD  Assistance Level: stand by assistance   Description: 2 standing rest breaks, occasional scissoring of feet, no loss of balance, mild veering of gait path to L, decreased step length and gait speed   Stairs Deferred stair training, indicated she could climb her steps at home          AM-PAC 6 CLICK MOBILITY  Turning over in bed (including adjusting bedclothes, sheets and blankets)?: 4  Sitting down on and standing up from a chair with arms (e.g., wheelchair, bedside commode, etc.): 4  Moving from lying on back to sitting on the side of the bed?: 4  Moving to and from a bed to a chair (including a wheelchair)?: 4  Need to walk in hospital room?: 3  Climbing 3-5 steps with a railing?: 3  Basic Mobility Total Score: 22       Therapeutic Activities and Exercises:   Patient educated on role of therapy, goals of session, benefits of out of bed mobility. Patient agreeable to mobilize with therapy.      Gait training: cued for pacing and navigation    Patient educated on PT schedule.  Encouraged patient to ambulate, sit up in chair 3x/day to prevent deconditioning during hospitalization. Patient verbalized understanding and agreement not to mobilize without RN assist. Patient in agreement with PT POC, HHPT.    Patient safe to ambulate with RN assist of 1 person, supervision.       Patient left up in chair with all lines intact, call button in reach, and spouse present at the end of session.    GOALS:   Multidisciplinary Problems       Physical Therapy Goals          Problem: Physical Therapy    Goal Priority Disciplines Outcome Goal Variances Interventions   Physical Therapy Goal     PT, PT/OT Ongoing, Progressing     Description: Goals to be met by:  10/13/2022     Patient will increase functional independence with mobility by performin. Sit to stand transfer with independence  2. Gait  x 200 feet with independence  3. Ascend/descend 2 stair with L handrails supervision using LRAD as needed  4. Lower extremity exercise program x10 reps per handout, with independence                        Time Tracking:     PT Received On: 10/03/22  PT Start Time: 1044     PT Stop Time: 1109  PT Total Time (min): 25 min     Billable Minutes: Therapeutic Activity 25    Treatment Type: Treatment  PT/PTA: PT     PTA Visit Number: 0     10/03/2022

## 2022-10-03 NOTE — PLAN OF CARE
END OF SHIFT NOTE  Pt rested well throughout shift, no distress at this time, no complaints, VSS, bed in lowest position, side rails up x2   remain at bedside, RENETTA drains emptied,  Call light within reach, personal items within reach. SHAR Koroma RN         Problem: Adult Inpatient Plan of Care  Goal: Plan of Care Review  Outcome: Ongoing, Progressing  Goal: Patient-Specific Goal (Individualized)  Outcome: Ongoing, Progressing  Goal: Absence of Hospital-Acquired Illness or Injury  Outcome: Ongoing, Progressing  Goal: Optimal Comfort and Wellbeing  Outcome: Ongoing, Progressing  Goal: Readiness for Transition of Care  Outcome: Ongoing, Progressing     Problem: Infection  Goal: Absence of Infection Signs and Symptoms  Outcome: Ongoing, Progressing     Problem: Skin Injury Risk Increased  Goal: Skin Health and Integrity  Outcome: Ongoing, Progressing     Problem: Fall Injury Risk  Goal: Absence of Fall and Fall-Related Injury  Outcome: Ongoing, Progressing

## 2022-10-04 LAB
FINAL PATHOLOGIC DIAGNOSIS: NORMAL
FROZEN SECTION DIAGNOSIS: NORMAL
FROZEN SECTION FOOTNOTE: NORMAL
GROSS: NORMAL
Lab: NORMAL
MICROSCOPIC EXAM: NORMAL
POCT GLUCOSE: 140 MG/DL (ref 70–110)
POCT GLUCOSE: 145 MG/DL (ref 70–110)

## 2022-10-04 PROCEDURE — 63600175 PHARM REV CODE 636 W HCPCS: Performed by: STUDENT IN AN ORGANIZED HEALTH CARE EDUCATION/TRAINING PROGRAM

## 2022-10-04 PROCEDURE — 25000003 PHARM REV CODE 250: Performed by: OTOLARYNGOLOGY

## 2022-10-04 PROCEDURE — 27000221 HC OXYGEN, UP TO 24 HOURS

## 2022-10-04 PROCEDURE — 25000003 PHARM REV CODE 250: Performed by: STUDENT IN AN ORGANIZED HEALTH CARE EDUCATION/TRAINING PROGRAM

## 2022-10-04 PROCEDURE — 99900035 HC TECH TIME PER 15 MIN (STAT)

## 2022-10-04 PROCEDURE — 20600001 HC STEP DOWN PRIVATE ROOM

## 2022-10-04 PROCEDURE — 94761 N-INVAS EAR/PLS OXIMETRY MLT: CPT

## 2022-10-04 PROCEDURE — 99900026 HC AIRWAY MAINTENANCE (STAT)

## 2022-10-04 RX ADMIN — ENOXAPARIN SODIUM 40 MG: 100 INJECTION SUBCUTANEOUS at 04:10

## 2022-10-04 RX ADMIN — PANTOPRAZOLE SODIUM 40 MG: 40 GRANULE, DELAYED RELEASE ORAL at 08:10

## 2022-10-04 RX ADMIN — BACITRACIN: 500 OINTMENT TOPICAL at 09:10

## 2022-10-04 RX ADMIN — HYDROCODONE BITARTRATE AND ACETAMINOPHEN 10 ML: 7.5; 325 SOLUTION ORAL at 03:10

## 2022-10-04 RX ADMIN — LACTULOSE 20 G: 20 SOLUTION ORAL at 08:10

## 2022-10-04 RX ADMIN — GUAIFENESIN 200 MG: 100 SOLUTION ORAL at 11:10

## 2022-10-04 RX ADMIN — ACETAMINOPHEN 650 MG: 325 TABLET ORAL at 11:10

## 2022-10-04 RX ADMIN — BACITRACIN: 500 OINTMENT TOPICAL at 03:10

## 2022-10-04 RX ADMIN — BACITRACIN: 500 OINTMENT TOPICAL at 08:10

## 2022-10-04 RX ADMIN — GUAIFENESIN 200 MG: 100 SOLUTION ORAL at 08:10

## 2022-10-04 RX ADMIN — GUAIFENESIN 200 MG: 100 SOLUTION ORAL at 03:10

## 2022-10-04 NOTE — SUBJECTIVE & OBJECTIVE
Interval History: refusing labs, no other complaints    Medications:  Continuous Infusions:  Scheduled Meds:   acetaminophen  650 mg Per G Tube Q6H    bacitracin   Topical (Top) TID    enoxaparin  40 mg Subcutaneous Daily    pantoprazole  40 mg Per G Tube Daily     PRN Meds:albuterol-ipratropium, cloNIDine, dextrose 10%, dextrose 10%, glucagon (human recombinant), guaiFENesin 100 mg/5 ml, hydrocodone-apap 7.5-325 MG/15 ML, ibuprofen, insulin aspart U-100, lactulose, ondansetron, prochlorperazine, sodium chloride 0.9%, sodium chloride 3%     Review of patient's allergies indicates:   Allergen Reactions    Bactrim [sulfamethoxazole-trimethoprim]     Clindamycin     Keflex [cephalexin]      Tolerated Unasyn 05/31/2019    Tramadol      DIZZY AND NAUSEA, & VOMITTING     Objective:     Vital Signs (24h Range):  Temp:  [96 °F (35.6 °C)-98.5 °F (36.9 °C)] 97.9 °F (36.6 °C)  Pulse:  [73-92] 84  Resp:  [14-20] 15  SpO2:  [94 %-98 %] 94 %  BP: (103-134)/(52-61) 106/53       Lines/Drains/Airways       Drain  Duration                  Gastrostomy/Enterostomy 05/30/19 1140 Percutaneous endoscopic gastrostomy (PEG) LUQ feeding 1222 days         Closed/Suction Drain 09/28/22 1227 Right Chest Bulb 15 Fr. 5 days         Closed/Suction Drain 09/28/22 1228 Right Chest Bulb 15 Fr. 5 days         Closed/Suction Drain 09/28/22 1320 Right Neck Bulb 10 Fr. 5 days         Gastrostomy/Enterostomy 09/28/22 0944 Gastrostomy tube w/ balloon midline feeding 5 days    Female External Urinary Catheter 09/30/22 0608 4 days              Airway  Duration                  Surgical Airway 06/06/19 0800 Shiley Uncuffed 1215 days         Airway - Non-Surgical 09/28/22 0917 5 days         Surgical Airway 09/28/22 1302 Shiley Cuffed 5 days              Peripheral Intravenous Line  Duration                  Midline Catheter Insertion/Assessment  - Single Lumen 10/02/22 1230 Left basilic vein (medial side of arm) other (see comments) 1 day                     Physical Exam  Awake, follows commands, responds appropriately  Face symmetric  Intra-oral flap appears perfused and viable, suture lines intact  Neck incision approximated with staples  Neck is soft, no fluid collections, Right RENETTA drain with sanguinous output  6-0 cuffless trach in place  Chest incision approximated with staples, Jpx2 in place with sanguinous output  Abdomen soft, nt, G tube in place     Significant Labs:  BMP:   Recent Labs   Lab 09/30/22  0619 10/01/22  0331 10/03/22  0355      < > 132*      < > 103   CO2 23   < > 23   BUN 10   < > 16   CREATININE 0.7   < > 0.6   CALCIUM 7.9*   < > 8.4*   MG 1.8  --   --     < > = values in this interval not displayed.     CBC:   Recent Labs   Lab 10/03/22  0355   WBC 6.04   RBC 2.50*   HGB 7.9*   HCT 23.8*      MCV 95   MCH 31.6*   MCHC 33.2       Significant Diagnostics:  I have reviewed and interpreted all pertinent imaging results/findings within the past 24 hours.

## 2022-10-04 NOTE — PLAN OF CARE
POC reviewed w PT and spouse, demonstrated understanding. AAOX4. VSS on 5L 28% TC. NSR on telemetry.     -R chest incision with staples WIN c/d/I  -Neck incision with staples WIN C/D/I  -Bacitracin applied to incisions per order  -x3 RENETTA drains - one to neck, 2 to R chest - minimal serosanguinous output  -Tolerating continuous tube feeds via PEG @ 55. No complaints of nausea. Gastric residual checked per order - less than 30cc  -Pain managed effectively with PRN meds per MAR.  -Accucheck q shift. Blood sugar WDL  -Ambulating with stand-by assistance in the room  -No BM.   -Adequate UOP  -PRN suctioning provided as needed.  -NPO    No adverse events this shift. Spouse at bedside. Will continue to manage POC.

## 2022-10-04 NOTE — PLAN OF CARE
Problem: Adult Inpatient Plan of Care  Goal: Plan of Care Review  Outcome: Ongoing, Progressing  Goal: Patient-Specific Goal (Individualized)  Outcome: Ongoing, Progressing  Goal: Optimal Comfort and Wellbeing  Outcome: Ongoing, Progressing     Problem: Infection  Goal: Absence of Infection Signs and Symptoms  Outcome: Ongoing, Progressing     Problem: Skin Injury Risk Increased  Goal: Skin Health and Integrity  Outcome: Ongoing, Progressing     Problem: Fall Injury Risk  Goal: Absence of Fall and Fall-Related Injury  Outcome: Ongoing, Progressing

## 2022-10-04 NOTE — PROGRESS NOTES
Jakub Nash - [facility]  Otorhinolaryngology-Head & Neck Surgery  Progress Note    Subjective:     Post-Op Info:  Procedure(s) (LRB):  EXCISION, NEOPLASM, MOUTH (Right)  TRACHEOTOMY (N/A)  CREATION, FREE FLAP (Right)  INSERTION, PEG TUBE (N/A)   6 Days Post-Op  Hospital Day: 7     Interval History: refusing labs, no other complaints    Medications:  Continuous Infusions:  Scheduled Meds:   acetaminophen  650 mg Per G Tube Q6H    bacitracin   Topical (Top) TID    enoxaparin  40 mg Subcutaneous Daily    pantoprazole  40 mg Per G Tube Daily     PRN Meds:albuterol-ipratropium, cloNIDine, dextrose 10%, dextrose 10%, glucagon (human recombinant), guaiFENesin 100 mg/5 ml, hydrocodone-apap 7.5-325 MG/15 ML, ibuprofen, insulin aspart U-100, lactulose, ondansetron, prochlorperazine, sodium chloride 0.9%, sodium chloride 3%     Review of patient's allergies indicates:   Allergen Reactions    Bactrim [sulfamethoxazole-trimethoprim]     Clindamycin     Keflex [cephalexin]      Tolerated Unasyn 05/31/2019    Tramadol      DIZZY AND NAUSEA, & VOMITTING     Objective:     Vital Signs (24h Range):  Temp:  [96 °F (35.6 °C)-98.5 °F (36.9 °C)] 97.9 °F (36.6 °C)  Pulse:  [73-92] 84  Resp:  [14-20] 15  SpO2:  [94 %-98 %] 94 %  BP: (103-134)/(52-61) 106/53       Lines/Drains/Airways       Drain  Duration                  Gastrostomy/Enterostomy 05/30/19 1140 Percutaneous endoscopic gastrostomy (PEG) LUQ feeding 1222 days         Closed/Suction Drain 09/28/22 1227 Right Chest Bulb 15 Fr. 5 days         Closed/Suction Drain 09/28/22 1228 Right Chest Bulb 15 Fr. 5 days         Closed/Suction Drain 09/28/22 1320 Right Neck Bulb 10 Fr. 5 days         Gastrostomy/Enterostomy 09/28/22 0944 Gastrostomy tube w/ balloon midline feeding 5 days    Female External Urinary Catheter 09/30/22 0608 4 days              Airway  Duration                  Surgical Airway 06/06/19 0800 Shiley Uncuffed 1215 days         Airway - Non-Surgical 09/28/22 0917 5  days         Surgical Airway 09/28/22 1302 Shiley Cuffed 5 days              Peripheral Intravenous Line  Duration                  Midline Catheter Insertion/Assessment  - Single Lumen 10/02/22 1230 Left basilic vein (medial side of arm) other (see comments) 1 day                    Physical Exam  Awake, follows commands, responds appropriately  Face symmetric  Intra-oral flap appears perfused and viable, suture lines intact  Neck incision approximated with staples  Neck is soft, no fluid collections, Right RENETTA drain with sanguinous output  6-0 cuffless trach in place  Chest incision approximated with staples, Jpx2 in place with sanguinous output  Abdomen soft, nt, G tube in place     Significant Labs:  BMP:   Recent Labs   Lab 09/30/22  0619 10/01/22  0331 10/03/22  0355      < > 132*      < > 103   CO2 23   < > 23   BUN 10   < > 16   CREATININE 0.7   < > 0.6   CALCIUM 7.9*   < > 8.4*   MG 1.8  --   --     < > = values in this interval not displayed.     CBC:   Recent Labs   Lab 10/03/22  0355   WBC 6.04   RBC 2.50*   HGB 7.9*   HCT 23.8*      MCV 95   MCH 31.6*   MCHC 33.2       Significant Diagnostics:  I have reviewed and interpreted all pertinent imaging results/findings within the past 24 hours.    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

## 2022-10-04 NOTE — ASSESSMENT & PLAN NOTE
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

## 2022-10-04 NOTE — PLAN OF CARE
[facility]    HOME HEALTH ORDERS  FACE TO FACE ENCOUNTER    Patient Name: Jennifer Andre  YOB: 1955    Physician: Dr. Wallace Santiago    Address: [address] [place] [address] / [facility] [address]    Phone Number: [phone]    Fax: [phone]    Encounter Date: 10/04/2022    Admit to Home Health    Diagnoses:   Active Hospital Problems    Diagnosis  POA    *Malignant neoplasm of oral cavity [C06.9]  Yes    Open wound of mouth [S01.502A]  Yes    Open wound of right chest wall [S21.101A]  Yes    Open wound of throat [S11.90XA]  Yes      Resolved Hospital Problems   No resolved problems to display.       Follow up Appointment: 1 week    I have seen and examined this patient face to face today. My clinical findings that support the need for the home health skilled services and home bound status are the following:  Weakness/numbness causing balance and gait disturbance due to malignancy/cancer and surgery making it taxing to leave home.  Medical restrictions requiring assistance of another human to leave home due to  unstable ambulation, post surgery monitoring and newly placed g-tube/ostomy.    Allergies:  Review of patient's allergies indicates:   Allergen Reactions    Bactrim [sulfamethoxazole-trimethoprim]     Clindamycin     Keflex [cephalexin]      Tolerated Unasyn 05/31/2019    Tramadol      DIZZY AND NAUSEA, & VOMITTING       Diet: Isosource 1.5 (or equivalent). Bolus 5 cans via PEG daily. Flush with free water before and after each feed, with meds, and as needed.    Activities: Light activity only. No heavy lifting, straining, stooping, exercising.       Nursing:   SN to complete comprehensive assessment including routine vital signs. Instruct on disease process and s/s of complications to report to MD. Review/verify medication list sent home with the patient at time of discharge  and instruct patient/caregiver as needed. Frequency may be adjusted depending on start of care  date.    Notify MD if SBP > 160 or < 90; DBP > 90 or < 50; HR > 120 or < 50; Temp > 101      CONSULTS:      Physical Therapy to evaluate and treat. Evaluate for home safety and equipment needs; Establish/upgrade home exercise program. Perform / instruct on therapeutic exercises, gait training, transfer training, and Range of Motion.  Work on shoulder abduction and trapezius strength.    Occupational Therapy to evaluate and treat. Evaluate home environment for safety and equipment needs. Perform/Instruct on transfers, ADL training, ROM, and therapeutic exercises.  Work on shoulder abduction and trapezius strength.    Speech Therapy  to evaluate and treat for language and swallowing.      MISCELLANEOUS CARE:  PEG Care:  Instruct patient/caregiver to clean site.  Monitor skin integrity.     WOUND CARE ORDERS  No heavy lifting > 10 lbs x 2 weeks. Keep incision clean and dry. Keep open to air .Okay to get wet. DO NOT scrub, soak, or submerge incision. Pat to dry. Apply bacitracin ointment to incision BID prn.       Medications: Review discharge medications with patient and family and provide education.    I certify that this patient is confined to home and needs nursing care, physical therapy, speech therapy and occupational therapy.    JAVED Shaffer, FNP-C  [facility]- Division of Head and Neck Surgical Oncology  [phone]

## 2022-10-05 LAB
POCT GLUCOSE: 125 MG/DL (ref 70–110)
POCT GLUCOSE: 137 MG/DL (ref 70–110)

## 2022-10-05 PROCEDURE — 27000221 HC OXYGEN, UP TO 24 HOURS

## 2022-10-05 PROCEDURE — 99900026 HC AIRWAY MAINTENANCE (STAT)

## 2022-10-05 PROCEDURE — 25000003 PHARM REV CODE 250: Performed by: STUDENT IN AN ORGANIZED HEALTH CARE EDUCATION/TRAINING PROGRAM

## 2022-10-05 PROCEDURE — 94761 N-INVAS EAR/PLS OXIMETRY MLT: CPT

## 2022-10-05 PROCEDURE — 97116 GAIT TRAINING THERAPY: CPT

## 2022-10-05 PROCEDURE — 63600175 PHARM REV CODE 636 W HCPCS: Performed by: STUDENT IN AN ORGANIZED HEALTH CARE EDUCATION/TRAINING PROGRAM

## 2022-10-05 PROCEDURE — 20600001 HC STEP DOWN PRIVATE ROOM

## 2022-10-05 PROCEDURE — 25000003 PHARM REV CODE 250: Performed by: OTOLARYNGOLOGY

## 2022-10-05 PROCEDURE — 99900035 HC TECH TIME PER 15 MIN (STAT)

## 2022-10-05 RX ADMIN — ENOXAPARIN SODIUM 40 MG: 100 INJECTION SUBCUTANEOUS at 05:10

## 2022-10-05 RX ADMIN — BACITRACIN: 500 OINTMENT TOPICAL at 09:10

## 2022-10-05 RX ADMIN — GUAIFENESIN 200 MG: 100 SOLUTION ORAL at 02:10

## 2022-10-05 RX ADMIN — GUAIFENESIN 200 MG: 100 SOLUTION ORAL at 07:10

## 2022-10-05 RX ADMIN — HYDROCODONE BITARTRATE AND ACETAMINOPHEN 10 ML: 7.5; 325 SOLUTION ORAL at 07:10

## 2022-10-05 RX ADMIN — ACETAMINOPHEN 650 MG: 325 TABLET ORAL at 06:10

## 2022-10-05 RX ADMIN — HYDROCODONE BITARTRATE AND ACETAMINOPHEN 10 ML: 7.5; 325 SOLUTION ORAL at 09:10

## 2022-10-05 RX ADMIN — ACETAMINOPHEN 650 MG: 325 TABLET ORAL at 12:10

## 2022-10-05 RX ADMIN — BACITRACIN: 500 OINTMENT TOPICAL at 03:10

## 2022-10-05 RX ADMIN — PANTOPRAZOLE SODIUM 40 MG: 40 GRANULE, DELAYED RELEASE ORAL at 09:10

## 2022-10-05 NOTE — PT/OT/SLP PROGRESS
Physical Therapy Treatment    Patient Name:  Jennifer Andre   MRN:  [id number]    Recommendations:     Discharge Recommendations:  home health PT   Discharge Equipment Recommendations: none   Barriers to discharge: None    Assessment:     Jennifer Andre is a 67 y.o. female admitted with a medical diagnosis of Malignant neoplasm of oral cavity.  She presents with the following impairments/functional limitations:  impaired functional mobility, gait instability Pt tolerated treatment well as she was able to ambulate for 300 ft with SBA and a scissoring gait pattern with narrow base of support. Pt will benefit from skilled PT 3x/wk in order to decrease gait instability. Pt when medically stable should discharge to [facility].    Rehab Prognosis: Good; patient would benefit from acute skilled PT services to address these deficits and reach maximum level of function.    Recent Surgery: Procedure(s) (LRB):  EXCISION, NEOPLASM, MOUTH (Right)  TRACHEOTOMY (N/A)  CREATION, FREE FLAP (Right)  INSERTION, PEG TUBE (N/A) 7 Days Post-Op    Plan:     During this hospitalization, patient to be seen 3 x/week to address the identified rehab impairments via gait training, therapeutic activities, therapeutic exercises and progress toward the following goals:    Plan of Care Expires:  10/29/22    Subjective     Chief Complaint: none  Patient/Family Comments/goals: to go home  Pain/Comfort:  Pain Rating 1: 0/10  Pain Rating Post-Intervention 1: 0/10      Objective:     Communicated with nurse prior to session.  Patient found supine with PEG Tube, Tracheostomy, RENETTA drain upon PT entry to room.     General Precautions: Standard, fall   Orthopedic Precautions:N/A   Braces:    Respiratory Status: Room air     Functional Mobility:  Bed Mobility:     Supine to Sit: stand by assistance    Transfers:     Sit to Stand:  stand by assistance with no AD    Gait: Pt gait train for 300ft with SBA. Pt had scissoring gait pattern with a narrow base of  support.  Pt politely declined stair training.    AM-PAC 6 CLICK MOBILITY  Turning over in bed (including adjusting bedclothes, sheets and blankets)?: 4  Sitting down on and standing up from a chair with arms (e.g., wheelchair, bedside commode, etc.): 4  Moving from lying on back to sitting on the side of the bed?: 4  Moving to and from a bed to a chair (including a wheelchair)?: 4  Need to walk in hospital room?: 4  Climbing 3-5 steps with a railing?: 3  Basic Mobility Total Score: 23       Therapeutic Activities and Exercises:   Pt was verbally educated on PT POC. Pt expressed verbal understanding of information.    Patient left up in chair with all lines intact and call button in reach..    GOALS:   Multidisciplinary Problems       Physical Therapy Goals          Problem: Physical Therapy    Goal Priority Disciplines Outcome Goal Variances Interventions   Physical Therapy Goal     PT, PT/OT Ongoing, Progressing     Description: Goals to be met by: 10/13/2022     Patient will increase functional independence with mobility by performin. Sit to stand transfer with independence  2. Gait  x 200 feet with independence  3. Ascend/descend 2 stair with L handrails supervision using LRAD as needed  4. Lower extremity exercise program x10 reps per handout, with independence                        Time Tracking:     PT Received On: 10/05/22  PT Start Time: 808     PT Stop Time: 819  PT Total Time (min): 11 min     Billable Minutes: Gait Training 11 min    Treatment Type: Treatment  PT/PTA: PT     PTA Visit Number: 0     10/05/2022

## 2022-10-05 NOTE — NURSING
POC reviewed with patient and . Verbilizes POC, AAOX4. Ambulating in room and up in chair. Tolerating wellI. Incisions to neck and chest intact with RENETTA x3 to suction.Tolerating Tfeedings of isosource. Remains NPO. Trach on RA with 02 sat WNL. NSR on telemetry. Medicated for pain as needed.WCTM  
PT refusing AM lab draws. MD Treviño notified. Will continue to manage POC.  
Pt alert and following commands. VSS on Trach Collar. UOP 650cc/shift, pierson pulled. Neck RENETTA output 40cc/shift. Chest RENETTA 1 output 30cc/shift. Chest RENETTA 2 output 25cc/shift. No acute events overnight. POC reviewed with pt and all questions and concerns addressed. Refer to flowsheet for VS and further assessment.  
Pt alert and following commands. VSS on Trach Collar. UOP 665cc/shift. Neck RENETTA output 55cc/shift. Chest RENETTA 1 output 30cc/shift. Chest RENETTA 2 output 60cc/shift. No acute events overnight. POC reviewed with pt and all questions and concerns addressed. Refer to flowsheet for VS and further assessment.  
no

## 2022-10-05 NOTE — PLAN OF CARE
Problem: Physical Therapy  Goal: Physical Therapy Goal  Description: Goals to be met by: 10/13/2022     Patient will increase functional independence with mobility by performin. Sit to stand transfer with independence  2. Gait  x 200 feet with independence  3. Ascend/descend 2 stair with L handrails supervision using LRAD as needed  4. Lower extremity exercise program x10 reps per handout, with independence   Outcome: Ongoing, Progressing   Goals remain appropriate 10/5/2022

## 2022-10-05 NOTE — SUBJECTIVE & OBJECTIVE
Interval History: NAEON. Refusing labs. Trach capped this morning. Transitioning to Bolus tube feeds.     Medications:  Continuous Infusions:  Scheduled Meds:   acetaminophen  650 mg Per G Tube Q6H    bacitracin   Topical (Top) TID    enoxaparin  40 mg Subcutaneous Daily    pantoprazole  40 mg Per G Tube Daily     PRN Meds:albuterol-ipratropium, cloNIDine, dextrose 10%, dextrose 10%, glucagon (human recombinant), guaiFENesin 100 mg/5 ml, hydrocodone-apap 7.5-325 MG/15 ML, ibuprofen, insulin aspart U-100, lactulose, ondansetron, prochlorperazine, sodium chloride 0.9%, sodium chloride 3%     Review of patient's allergies indicates:   Allergen Reactions    Bactrim [sulfamethoxazole-trimethoprim]     Clindamycin     Keflex [cephalexin]      Tolerated Unasyn 05/31/2019    Tramadol      DIZZY AND NAUSEA, & VOMITTING     Objective:     Vital Signs (24h Range):  Temp:  [96.2 °F (35.7 °C)-98.5 °F (36.9 °C)] 98.3 °F (36.8 °C)  Pulse:  [68-91] 73  Resp:  [17-20] 18  SpO2:  [93 %-99 %] 97 %  BP: (110-154)/(53-65) 138/65       Lines/Drains/Airways       Drain  Duration                  Gastrostomy/Enterostomy 05/30/19 1140 Percutaneous endoscopic gastrostomy (PEG) LUQ feeding 1223 days         Closed/Suction Drain 09/28/22 1227 Right Chest Bulb 15 Fr. 6 days         Closed/Suction Drain 09/28/22 1228 Right Chest Bulb 15 Fr. 6 days         Closed/Suction Drain 09/28/22 1320 Right Neck Bulb 10 Fr. 6 days         Gastrostomy/Enterostomy 09/28/22 0944 Gastrostomy tube w/ balloon midline feeding 6 days    Female External Urinary Catheter 09/30/22 0608 5 days              Airway  Duration                  Surgical Airway 06/06/19 0800 Shiley Uncuffed 1216 days         Airway - Non-Surgical 09/28/22 0917 6 days         Surgical Airway 09/28/22 1302 Shiley Cuffed 6 days              Peripheral Intravenous Line  Duration                  Midline Catheter Insertion/Assessment  - Single Lumen 10/02/22 1230 Left basilic vein (medial side of  arm) other (see comments) 2 days                    Physical Exam  Awake, follows commands, responds appropriately  Face symmetric  Intra-oral flap appears perfused and viable, suture lines intact  Neck incision approximated with staples  Neck is soft, no fluid collections, Right RENETTA drain with sanguinous output  6-0 cuffless trach in place, now capped  Chest incision approximated with staples, Jpx2 in place with sanguinous output  Abdomen soft, nt, G tube in place    Significant Labs:  BMP:   Recent Labs   Lab 09/30/22  0619 10/01/22  0331 10/03/22  0355      < > 132*      < > 103   CO2 23   < > 23   BUN 10   < > 16   CREATININE 0.7   < > 0.6   CALCIUM 7.9*   < > 8.4*   MG 1.8  --   --     < > = values in this interval not displayed.     CBC:   Recent Labs   Lab 10/03/22  0355   WBC 6.04   RBC 2.50*   HGB 7.9*   HCT 23.8*      MCV 95   MCH 31.6*   MCHC 33.2       Significant Diagnostics:  None

## 2022-10-05 NOTE — PLAN OF CARE
POC reviewed w PT and spouse, demonstrated understanding. AAOX4. VSS on 5L 28% TC. NSR on telemetry.      -R chest incision with staples WIN c/d/I  -Neck incision with staples WIN C/D/I  -Bacitracin applied to incisions per order  -x3 RENETTA drains - one to neck, 2 to R chest - minimal serosanguinous output  -Tolerating continuous tube feeds via PEG @ 55. No complaints of nausea. Gastric residual checked per order - less than 30cc  -Pain managed effectively with PRN meds per MAR.  -Accucheck q shift. Blood sugar WDL  -Ambulating with stand-by assistance in the room  -No BM.   -Adequate UOP  -PRN suctioning provided as needed.  -NPO  -PT refusing lab draws again this AM. MD Ramon notified.      No adverse events this shift. Spouse at bedside. Will continue to manage POC.

## 2022-10-05 NOTE — RESPIRATORY THERAPY
RAPID RESPONSE RESPIRATORY THERAPY PROACTIVE NOTE           Time of visit: 834     Code Status: Full Code   : 1955  Bed: 1062/1062 A:   MRN: [id number]  Time spent at the bedside: < 15 min    SITUATION    Evaluated patient for: LDA Check     BACKGROUND    Patient has a past medical history of Cancer.  Clinically Significant Surgical Hx: tracheostomy    24 Hours Vitals Range:  Temp:  [96.2 °F (35.7 °C)-98.5 °F (36.9 °C)]   Pulse:  [68-97]   Resp:  [17-20]   BP: (113-154)/(54-65)   SpO2:  [92 %-98 %]     Labs:    Recent Labs     10/03/22  0355   *   K 3.8      CO2 23   CREATININE 0.6   *        No results for input(s): PH, PCO2, PO2, HCO3, POCSATURATED, BE in the last 72 hours.    ASSESSMENT/INTERVENTIONS  Pt up in chair with no complaint for respiratory at time of visit. Trach currently capped, clean, and secure, all supplies at [facility].      Last VS   Temp: 97 °F (36.1 °C) (10/05 0800)  Pulse: 80 (10/05 1142)  Resp: 17 (10/05 1142)  BP: 129/61 (10/05 0800)  SpO2: 95 % (10/05 1142)      Extra trachs at bedside: 6.0 & 4.0 Shiley Cuffed  Level of Consciousness: Level of Consciousness (AVPU): alert  Respiratory Effort: Respiratory Effort: Normal, Unlabored Expansion/Accessory Muscle Usage: Expansion/Accessory Muscles/Retractions: expansion symmetric, no retractions, no use of accessory muscles  All Lung Field Breath Sounds: All Lung Fields Breath Sounds: Anterior:, Lateral:, diminished, coarse  O2 Device/Concentration: room air  Surgical airway: Yes, Type: Shiley Size: 6, uncuffed  Ambu at bedside: Ambu bag with the patient?: Yes, Adult Ambu     Active Orders   Respiratory Care    Inhalation Treatment Q4H PRN     Frequency: Q4H PRN     Number of Occurrences: Until Specified    Oxygen Continuous     Frequency: Continuous     Number of Occurrences: Until Specified     Order Comments: With humidification       Order Questions:      Device type: Low flow      Device: Trach Collar      FiO2%: 28       Titrate O2 per Oxygen Titration Protocol: Yes      To maintain SpO2 goal of: >= 92%      Notify MD of: Inability to achieve desired SpO2    Pulse Oximetry Q4H     Frequency: Q4H     Number of Occurrences: Until Specified    Routine tracheostomy care     Frequency: BID     Number of Occurrences: Until Specified       RECOMMENDATIONS    We recommend: RRT Recs: Continue POC per primary team.      FOLLOW-UP    Please call back the Rapid Response RT, Humberto Coombs, RRT at x [phone] for any questions or concerns.

## 2022-10-05 NOTE — PROGRESS NOTES
[facility] - [facility]  Otorhinolaryngology-Head & Neck Surgery  Progress Note    Subjective:     Post-Op Info:  Procedure(s) (LRB):  EXCISION, NEOPLASM, MOUTH (Right)  TRACHEOTOMY (N/A)  CREATION, FREE FLAP (Right)  INSERTION, PEG TUBE (N/A)   7 Days Post-Op  Hospital Day: 8     Interval History: NAEON. Refusing labs. Trach capped this morning. Transitioning to Bolus tube feeds.     Medications:  Continuous Infusions:  Scheduled Meds:   acetaminophen  650 mg Per G Tube Q6H    bacitracin   Topical (Top) TID    enoxaparin  40 mg Subcutaneous Daily    pantoprazole  40 mg Per G Tube Daily     PRN Meds:albuterol-ipratropium, cloNIDine, dextrose 10%, dextrose 10%, glucagon (human recombinant), guaiFENesin 100 mg/5 ml, hydrocodone-apap 7.5-325 MG/15 ML, ibuprofen, insulin aspart U-100, lactulose, ondansetron, prochlorperazine, sodium chloride 0.9%, sodium chloride 3%     Review of patient's allergies indicates:   Allergen Reactions    Bactrim [sulfamethoxazole-trimethoprim]     Clindamycin     Keflex [cephalexin]      Tolerated Unasyn 05/31/2019    Tramadol      DIZZY AND NAUSEA, & VOMITTING     Objective:     Vital Signs (24h Range):  Temp:  [96.2 °F (35.7 °C)-98.5 °F (36.9 °C)] 98.3 °F (36.8 °C)  Pulse:  [68-91] 73  Resp:  [17-20] 18  SpO2:  [93 %-99 %] 97 %  BP: (110-154)/(53-65) 138/65       Lines/Drains/Airways       Drain  Duration                  Gastrostomy/Enterostomy 05/30/19 1140 Percutaneous endoscopic gastrostomy (PEG) LUQ feeding 1223 days         Closed/Suction Drain 09/28/22 1227 Right Chest Bulb 15 Fr. 6 days         Closed/Suction Drain 09/28/22 1228 Right Chest Bulb 15 Fr. 6 days         Closed/Suction Drain 09/28/22 1320 Right Neck Bulb 10 Fr. 6 days         Gastrostomy/Enterostomy 09/28/22 0944 Gastrostomy tube w/ balloon midline feeding 6 days    Female External Urinary Catheter 09/30/22 0608 5 days              Airway  Duration                  Surgical Airway 06/06/19 0800 Subha Uncuffed 1219  days         Airway - Non-Surgical 09/28/22 0917 6 days         Surgical Airway 09/28/22 1302 Shiley Cuffed 6 days              Peripheral Intravenous Line  Duration                  Midline Catheter Insertion/Assessment  - Single Lumen 10/02/22 1230 Left basilic vein (medial side of arm) other (see comments) 2 days                    Physical Exam  Awake, follows commands, responds appropriately  Face symmetric  Intra-oral flap appears perfused and viable, suture lines intact  Neck incision approximated with staples  Neck is soft, no fluid collections, Right RENETTA drain with sanguinous output  6-0 cuffless trach in place, now capped  Chest incision approximated with staples, Jpx2 in place with sanguinous output  Abdomen soft, nt, G tube in place    Significant Labs:  BMP:   Recent Labs   Lab 09/30/22  0619 10/01/22  0331 10/03/22  0355      < > 132*      < > 103   CO2 23   < > 23   BUN 10   < > 16   CREATININE 0.7   < > 0.6   CALCIUM 7.9*   < > 8.4*   MG 1.8  --   --     < > = values in this interval not displayed.     CBC:   Recent Labs   Lab 10/03/22  0355   WBC 6.04   RBC 2.50*   HGB 7.9*   HCT 23.8*      MCV 95   MCH 31.6*   MCHC 33.2       Significant Diagnostics:  None    Assessment/Plan:     * Malignant neoplasm of oral cavity  Recurrent SCC of OC s/p composite resection and Pec flap reconstruction on 9/28    - Fresh trach care   - trach collar with humidified O2  - NPO  - tube feeds at goal - transition to bolus feeds  - refusing labs, DC for now  - lovenox   - PMSVm, capped trach  - PT/OT    Disposition: likely dc tomorrow if can decannulate        Flex Escamilla MD  Otorhinolaryngology-Head & Neck Surgery  [facility]

## 2022-10-05 NOTE — PT/OT/SLP PROGRESS
Speech Language Pathology      Jennifer Andre  MRN: [id number]    Speaking valve eval deferred 2' to patient tolerating trach cap on attempt. ST will f/u for PMSV eval on 10/6/22 pending appropriateness.     Kuldip Arroyo CCC-SLP  Speech-Language Pathology  Pager: [phone]

## 2022-10-05 NOTE — ASSESSMENT & PLAN NOTE
Recurrent SCC of OC s/p composite resection and Pec flap reconstruction on 9/28    - Fresh trach care   - trach collar with humidified O2  - NPO  - tube feeds at goal - transition to bolus feeds  - refusing labs, DC for now  - lovenox   - PMSVm, capped trach  - PT/OT    Disposition: likely dc tomorrow if can decannulate

## 2022-10-06 VITALS
HEIGHT: 63 IN | HEART RATE: 84 BPM | BODY MASS INDEX: 22.15 KG/M2 | OXYGEN SATURATION: 95 % | RESPIRATION RATE: 20 BRPM | SYSTOLIC BLOOD PRESSURE: 122 MMHG | WEIGHT: 125 LBS | TEMPERATURE: 100 F | DIASTOLIC BLOOD PRESSURE: 57 MMHG

## 2022-10-06 PROCEDURE — 25000003 PHARM REV CODE 250: Performed by: STUDENT IN AN ORGANIZED HEALTH CARE EDUCATION/TRAINING PROGRAM

## 2022-10-06 PROCEDURE — 25000003 PHARM REV CODE 250: Performed by: OTOLARYNGOLOGY

## 2022-10-06 PROCEDURE — 99900035 HC TECH TIME PER 15 MIN (STAT)

## 2022-10-06 PROCEDURE — 94761 N-INVAS EAR/PLS OXIMETRY MLT: CPT

## 2022-10-06 PROCEDURE — 99900026 HC AIRWAY MAINTENANCE (STAT)

## 2022-10-06 PROCEDURE — 27000221 HC OXYGEN, UP TO 24 HOURS

## 2022-10-06 RX ORDER — HYDROCODONE BITARTRATE AND ACETAMINOPHEN 7.5; 325 MG/15ML; MG/15ML
10 SOLUTION ORAL EVERY 4 HOURS PRN
Qty: 473 ML | Refills: 0 | Status: SHIPPED | OUTPATIENT
Start: 2022-10-06 | End: 2022-10-28

## 2022-10-06 RX ORDER — GUAIFENESIN 100 MG/5ML
200 SOLUTION ORAL EVERY 4 HOURS PRN
Qty: 473 ML | Refills: 0 | Status: SHIPPED | OUTPATIENT
Start: 2022-10-06 | End: 2022-10-16

## 2022-10-06 RX ORDER — DOXYCYCLINE 25 MG/5ML
100 POWDER, FOR SUSPENSION ORAL 2 TIMES DAILY
Qty: 420 ML | Refills: 0 | Status: SHIPPED | OUTPATIENT
Start: 2022-10-06 | End: 2022-12-06

## 2022-10-06 RX ORDER — TRIPROLIDINE/PSEUDOEPHEDRINE 2.5MG-60MG
400 TABLET ORAL EVERY 6 HOURS PRN
Qty: 473 ML | Refills: 0 | Status: SHIPPED | OUTPATIENT
Start: 2022-10-06 | End: 2022-11-17 | Stop reason: SDUPTHER

## 2022-10-06 RX ORDER — PANTOPRAZOLE SODIUM 40 MG/1
40 FOR SUSPENSION ORAL DAILY
Qty: 30 PACKET | Refills: 11 | Status: SHIPPED | OUTPATIENT
Start: 2022-10-06 | End: 2022-11-17 | Stop reason: SDUPTHER

## 2022-10-06 RX ADMIN — ACETAMINOPHEN 650 MG: 325 TABLET ORAL at 01:10

## 2022-10-06 RX ADMIN — HYDROCODONE BITARTRATE AND ACETAMINOPHEN 10 ML: 7.5; 325 SOLUTION ORAL at 05:10

## 2022-10-06 RX ADMIN — ACETAMINOPHEN 650 MG: 325 TABLET ORAL at 12:10

## 2022-10-06 RX ADMIN — BACITRACIN: 500 OINTMENT TOPICAL at 03:10

## 2022-10-06 RX ADMIN — HYDROCODONE BITARTRATE AND ACETAMINOPHEN 10 ML: 7.5; 325 SOLUTION ORAL at 10:10

## 2022-10-06 RX ADMIN — BACITRACIN: 500 OINTMENT TOPICAL at 09:10

## 2022-10-06 RX ADMIN — ACETAMINOPHEN 650 MG: 325 TABLET ORAL at 05:10

## 2022-10-06 RX ADMIN — GUAIFENESIN 200 MG: 100 SOLUTION ORAL at 05:10

## 2022-10-06 RX ADMIN — HYDROCODONE BITARTRATE AND ACETAMINOPHEN 10 ML: 7.5; 325 SOLUTION ORAL at 02:10

## 2022-10-06 RX ADMIN — GUAIFENESIN 200 MG: 100 SOLUTION ORAL at 10:10

## 2022-10-06 RX ADMIN — PANTOPRAZOLE SODIUM 40 MG: 40 GRANULE, DELAYED RELEASE ORAL at 10:10

## 2022-10-06 RX ADMIN — HYDROCODONE BITARTRATE AND ACETAMINOPHEN 10 ML: 7.5; 325 SOLUTION ORAL at 01:10

## 2022-10-06 RX ADMIN — GUAIFENESIN 200 MG: 100 SOLUTION ORAL at 01:10

## 2022-10-06 NOTE — DISCHARGE INSTRUCTIONS
Diet: Nothing to eat by mouth.  Isosource 1.5 (or equivalent). Bolus 5 cans via PEG daily. Flush with free water before and after each feed, with meds, and as needed.    Activity: light activity only such as walking. Do not lift anything over 10lb x2 weeks.    Wound care: Wash incisions gently with soap and water, pat dry. Keep drain sites dry. Empty and record drain output daily.    Follow up in 1 week.    Please call our office if you have any questions or problems such as fever, chills, or drainage from incisions.

## 2022-10-06 NOTE — DISCHARGE SUMMARY
Otolaryngology - Head and Neck Surgery  Discharge Summary    Admission Date: 9/28/2022  Discharge Date: 10/06/2022    Admission Diagnosis:   1. Open wound of mouth, initial encounter    2. Malignant neoplasm of oral cavity    3. Pre-op testing    4. Open wound of right chest wall, initial encounter    5. Open wound of throat, initial encounter    6. Tracheostomy present        Discharge Diagnosis:   1. Open wound of mouth, initial encounter    2. Malignant neoplasm of oral cavity    3. Pre-op testing    4. Open wound of right chest wall, initial encounter    5. Open wound of throat, initial encounter    6. Tracheostomy present        History of Present Illness:   Jennifer Andre is a 67 y.o. 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  She recently re-presented with evidence of a new lesion in the right lateral floor of mouth/buccal mucosa/skin paddle for fibular free flap.  Given these findings, it was recommended that we return to the operating room for resection and reconstruction either utilizing a free flap or a pectoralis major myocutaneous flap.     She was apprised of the risks, benefits and alternatives to surgery.  In spite of the risk inherent to surgery,she provided informed consent for the aforementioned procedures.     Procedures:  1. Revision tracheostomy   2. Right partial glossectomy with resection of floor of mouth/buccal mucosa/mandibular gingiva/tongue base and right soft palate  3. Right pectoralis major myocutaneous regional flap for coverage of oral cavity defect 11x 6 cm  4. Complex vestibuloplasty   5. Pharyngoplasty   6.  Advancement flap for closure of secondary chest defect with advanced area being 20 x 22 cm    7. Tracheostomy tube exchange    Hospital Course: Jennifer Andre underwent the aforementioned surgeries with Dr. Santiago and Dr. Price on 9/28. Following surgery, she was transferred to the SICU. She stepped down to the [facility] on 10/01.  Her hospital stay was uneventful overall. She tolerated a capping trial and was decannulated on 10/6. She was discharged home later that day in stable condition.    Consults: PT, OT, SLP, nutrition, social work    Medications:       Medication List        START taking these medications      doxycycline 50 mg/5 mL Syrp  Commonly known as: VIBRAMYCIN  Take 10ml by PEG twice daily for 10 days.     guaiFENesin 100 mg/5 ml 100 mg/5 mL syrup  Commonly known as: ROBITUSSIN  10 mLs (200 mg total) by Per G Tube route every 4 (four) hours as needed for Cough (Thick secretions).     hydrocodone-apap 7.5-325 MG/15 ML oral solution  Commonly known as: HYCET  10 mLs by Per G Tube route every 4 (four) hours as needed for Pain.     ibuprofen 100 mg/5 mL suspension  Commonly known as: ADVIL,MOTRIN  20 mLs (400 mg total) by Per G Tube route every 6 (six) hours as needed for Pain.     pantoprazole 40 mg suspension  Commonly known as: PROTONIX  1 packet (40 mg total) by Per G Tube route once daily. Empty into 60 mL catheter syringe, Add 10 mL *apple juice*, shake, add additional 10 mL to rinse  Replaces: pantoprazole 40 MG tablet            STOP taking these medications      pantoprazole 40 MG tablet  Commonly known as: PROTONIX  Replaced by: pantoprazole 40 mg suspension               Where to Get Your Medications        These medications were sent to [facility] [address], [place] [address]      Hours: Mon-Fri 7a-7p, Sat-Sun 10a-4p Phone: [phone]   doxycycline 50 mg/5 mL Syrp  guaiFENesin 100 mg/5 ml 100 mg/5 mL syrup  hydrocodone-apap 7.5-325 MG/15 ML oral solution  ibuprofen 100 mg/5 mL suspension  pantoprazole 40 mg suspension         Disposition: home    Instructions:   Diet- NPO, use tube feed for all meds and nutrition   Lifting restrictions    Order Comments:  No heavy lifting (nothing greater than 20 lbs), no stooping   Call MD for: temperature >100.4    Call MD for: redness, tenderness, or  signs of infection (pain, swelling, redness, odor or green/yellow discharge around incision site)    Change dressing (specify)    Order Comments:       Follow up in 1 week    Call our office at [phone] for any problems or questions

## 2022-10-06 NOTE — SUBJECTIVE & OBJECTIVE
Interval History: No issues overnight. Tolerated trach capping x24 hours.     Medications:  Continuous Infusions:  Scheduled Meds:   acetaminophen  650 mg Per G Tube Q6H    bacitracin   Topical (Top) TID    enoxaparin  40 mg Subcutaneous Daily    pantoprazole  40 mg Per G Tube Daily     PRN Meds:albuterol-ipratropium, cloNIDine, dextrose 10%, dextrose 10%, glucagon (human recombinant), guaiFENesin 100 mg/5 ml, hydrocodone-apap 7.5-325 MG/15 ML, ibuprofen, insulin aspart U-100, lactulose, ondansetron, prochlorperazine, sodium chloride 0.9%, sodium chloride 3%     Review of patient's allergies indicates:   Allergen Reactions    Bactrim [sulfamethoxazole-trimethoprim]     Clindamycin     Keflex [cephalexin]      Tolerated Unasyn 05/31/2019    Tramadol      DIZZY AND NAUSEA, & VOMITTING     Objective:     Vital Signs (24h Range):  Temp:  [97.5 °F (36.4 °C)-99.6 °F (37.6 °C)] 97.8 °F (36.6 °C)  Pulse:  [69-86] 83  Resp:  [13-20] 20  SpO2:  [93 %-96 %] 93 %  BP: (116-129)/(56-62) 119/57       Lines/Drains/Airways       Drain  Duration                  Gastrostomy/Enterostomy 05/30/19 1140 Percutaneous endoscopic gastrostomy (PEG) LUQ feeding 1224 days         Closed/Suction Drain 09/28/22 1227 Right Chest Bulb 15 Fr. 7 days         Closed/Suction Drain 09/28/22 1228 Right Chest Bulb 15 Fr. 7 days         Closed/Suction Drain 09/28/22 1320 Right Neck Bulb 10 Fr. 7 days         Gastrostomy/Enterostomy 09/28/22 0944 Gastrostomy tube w/ balloon midline feeding 7 days    Female External Urinary Catheter 09/30/22 0608 6 days              Airway  Duration                  Surgical Airway 06/06/19 0800 Shiley Uncuffed 1218 days         Airway - Non-Surgical 09/28/22 0917 7 days         Surgical Airway 09/28/22 1302 Shiley Cuffed 7 days              Peripheral Intravenous Line  Duration                  Midline Catheter Insertion/Assessment  - Single Lumen 10/02/22 1230 Left basilic vein (medial side of arm) other (see comments) 3  days                    Physical Exam  Awake, follows commands, responds appropriately  Face symmetric  Intra-oral flap appears perfused and viable, suture lines intact  Neck incision approximated with staples  Neck is soft, no fluid collections, Right RENETTA drain with sanguinous output  6-0 cuffless trach in place,   capped  Chest incision approximated with staples, Jpx2 in place with sanguinous output  Abdomen soft, nt, G tube in place    Significant Labs:  BMP:   Recent Labs   Lab 09/30/22  0619 10/01/22  0331 10/03/22  0355      < > 132*      < > 103   CO2 23   < > 23   BUN 10   < > 16   CREATININE 0.7   < > 0.6   CALCIUM 7.9*   < > 8.4*   MG 1.8  --   --     < > = values in this interval not displayed.       CBC:   Recent Labs   Lab 10/03/22  0355   WBC 6.04   RBC 2.50*   HGB 7.9*   HCT 23.8*      MCV 95   MCH 31.6*   MCHC 33.2         Significant Diagnostics:  None

## 2022-10-06 NOTE — RESPIRATORY THERAPY
RAPID RESPONSE RESPIRATORY THERAPY PROACTIVE NOTE           Time of visit: 847     Code Status: Full Code   : 1955  Bed: 1062/1062 A:   MRN: [id number]  Time spent at the bedside: < 15 min    SITUATION    Evaluated patient for: LDA Check     BACKGROUND    Patient has a past medical history of Cancer.  Clinically Significant Surgical Hx: tracheostomy    24 Hours Vitals Range:  Temp:  [97.5 °F (36.4 °C)-99.9 °F (37.7 °C)]   Pulse:  [69-86]   Resp:  [13-20]   BP: (116-129)/(56-62)   SpO2:  [93 %-96 %]     Labs:    No results for input(s): NA, K, CL, CO2, CREATININE, GLU, PHOS, MG in the last 72 hours.    Invalid input(s): CMP,  BUN, TBIL     No results for input(s): PH, PCO2, PO2, HCO3, POCSATURATED, BE in the last 72 hours.    ASSESSMENT/INTERVENTIONS  Pt resting in bed, decannulated earlier in the morning and reports no SOB since. All emergency trach supplies remaining in room until discharge.      Last VS   Temp: 99.9 °F (37.7 °C) (10/06 1102)  Pulse: 84 (10/06 1102)  Resp: 18 (10/06 1102)  BP: 122/57 (10/06 1102)  SpO2: 95 % (10/06 1102)      Extra trachs at bedside: 6.0 & 4.0 Shiley Cuffed  Level of Consciousness: Level of Consciousness (AVPU): alert  Respiratory Effort: Respiratory Effort: Unlabored Expansion/Accessory Muscle Usage: Expansion/Accessory Muscles/Retractions: no retractions, no use of accessory muscles  All Lung Field Breath Sounds: All Lung Fields Breath Sounds: equal bilaterally, diminished  O2 Device/Concentration: room air  Surgical airway: No  Ambu at bedside: Ambu bag with the patient?: Yes, Adult Ambu     Active Orders   Respiratory Care    Inhalation Treatment Q4H PRN     Frequency: Q4H PRN     Number of Occurrences: Until Specified    Oxygen Continuous     Frequency: Continuous     Number of Occurrences: Until Specified     Order Comments: With humidification       Order Questions:      Device type: Low flow      Device: Trach Collar      FiO2%: 28      Titrate O2 per Oxygen  Titration Protocol: Yes      To maintain SpO2 goal of: >= 92%      Notify MD of: Inability to achieve desired SpO2    Pulse Oximetry Q4H     Frequency: Q4H     Number of Occurrences: Until Specified    Routine tracheostomy care     Frequency: BID     Number of Occurrences: Until Specified       RECOMMENDATIONS    We recommend: RRT Recs: Continue POC per primary team.      FOLLOW-UP    Please call back the Rapid Response RT, Humberto Coombs, RRT at x [phone] for any questions or concerns.

## 2022-10-06 NOTE — PROGRESS NOTES
Jakub Nash - [facility]  Adult Nutrition  Progress Note    SUMMARY       Recommendations    1. Decrease Isosource 1.5 to 4 cans x day to provide 1500 kcal, 68g protein, 764 ml fluid.  2. If able to tolerate PO intake, ADAT regular diet - encourage adequate PO intake texture per SLP   3. RD following    Goals: Meet % EEN/EPN by RD f/u date  Nutrition Goal Status: progressing towards goal  Communication of RD Recs: other (comment) (POC)    Assessment and Plan    Nutrition Problem  Inadequate oral intake     Related to (etiology):   Inability to consume sufficient energy     Signs and Symptoms (as evidenced by):   NPO, S/P ORAL SURGERY      Interventions/Recommendations (treatment strategy):  Collaborate with other provides  EN     Nutrition Diagnosis Status:   Continues     Reason for Assessment    Reason For Assessment: RD follow-up  Diagnosis: other (see comments) (malignant neoplasm of oral cavity)  Relevant Medical History: GERD, moderate malnutrition, 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 Unable to collect any information regarding toleration of bolus feeds. Per chart review, Pts weight has decreased 2# x 1 month. CBW - 125# Pt appears nourished, NFPE not warranted at this time. Per chart review - LBM noted 10/2.     Nutrition Discharge Planning: pending clinical course    Nutrition Risk Screen    Nutrition Risk Screen: tube feeding or parenteral nutrition    Nutrition/Diet History    Patient Reported Diet/Restrictions/Preferences: general  Typical Food/Fluid Intake: soft foods  Spiritual, Cultural Beliefs, Restorationism Practices, Values that Affect Care: no  Supplemental Drinks or Food Habits: Premier Protein  Food Allergies: NKFA  Factors Affecting Nutritional Intake: chewing difficulties/inability to chew food, pain    Anthropometrics    Temp: 99.9 °F (37.7 °C)  Height Method: Stated  Height:  "5' 3" (160 cm)  Height (inches): 63 in  Weight Method: Bed Scale  Weight: 56.7 kg (125 lb)  Weight (lb): 125 lb  Ideal Body Weight (IBW), Female: 115 lb  % Ideal Body Weight, Female (lb): 109.46 %  BMI (Calculated): 22.1  BMI Grade: 18.5-24.9 - normal  Usual Body Weight (UBW), k.36 kg  % Usual Body Weight: 100.01  % Weight Change From Usual Weight: -0.2 %     Lab/Procedures/Meds    Pertinent Labs Reviewed: reviewed  Pertinent Labs Comments: Glucose 132, Na 135  Pertinent Medications Reviewed: reviewed  Pertinent Medications Comments: Enoxaparin, Insulin, Pantoprazole    Estimated/Assessed Needs    Weight Used For Calorie Calculations: 56.7 kg (125 lb)  Energy Calorie Requirements (kcal): 7824-9211  Energy Need Method: Kcal/kg (25 - 30 kcal/kg)  Protein Requirements: 45 - 57 g (0.8-1.0 g/kg)  Weight Used For Protein Calculations: 56.7 kg (125 lb)  Fluid Requirements (mL): Per MD or 1ml/kcal  Estimated Fluid Requirement Method: RDA Method  RDA Method (mL): 1400     Nutrition Prescription Ordered    Current Diet Order: NPO  Current Nutrition Support Formula Ordered: Isosource 1.5 (Bolus Feeds)  Current Nutrition Support Frequency Ordered: 5 cans x day    Evaluation of Received Nutrient/Fluid Intake    Enteral Calories (kcal): 1875  Enteral Protein (gm): 85  Enteral (Free Water) Fluid (mL): 955  % Kcal Needs: 0  % Protein Needs: 0  I/O: +9.1 L  Energy Calories Required: exceeds needs  Protein Required: exceeds needs  % Intake of Estimated Energy Needs: 75 - 100 %  % Meal Intake: NPO    Nutrition Risk    Level of Risk/Frequency of Follow-up:  (1 x week)     Monitor and Evaluation    Food and Nutrient Intake: energy intake, food and beverage intake, enteral nutrition intake  Food and Nutrient Adminstration: diet order, enteral and parenteral nutrition administration  Knowledge/Beliefs/Attitudes: food and nutrition knowledge/skill, beliefs and attitudes  Physical Activity and Function: nutrition-related ADLs and " IADLs  Anthropometric Measurements: height/length, weight, body mass index, weight change  Biochemical Data, Medical Tests and Procedures: electrolyte and renal panel, gastrointestinal profile, glucose/endocrine profile, inflammatory profile, lipid profile  Nutrition-Focused Physical Findings: overall appearance     Nutrition Follow-Up    RD Follow-up?: Yes    Susana Nguyen - Dietetic Intern

## 2022-10-06 NOTE — PLAN OF CARE
Pt is A&Ox4 injury free. Breathing is regular equal and unlabored bilaterally on room air. Pain controlled with oral medication and and cough controlled with oral medication.  spent the night. Pt had no complaints of pain during the night. RENETTA drains x 3 stripped and drained during night shift.

## 2022-10-06 NOTE — PLAN OF CARE
Recommendations    1. Decrease Isosource 1.5 to 4 cans x day to provide 1500 kcal, 68g protein, 764 ml fluid.  2. If able to tolerate PO intake, ADAT regular diet - encourage adequate PO intake texture per SLP   3. RD following    Goals: Meet % EEN/EPN by RD f/u date  Nutrition Goal Status: progressing towards goal  Communication of RD Recs: other (comment) (POC)    Susana Nguyen Dietetic Intern

## 2022-10-06 NOTE — PROGRESS NOTES
Jakub Nash - [facility]  Otorhinolaryngology-Head & Neck Surgery  Progress Note    Subjective:     Post-Op Info:  Procedure(s) (LRB):  EXCISION, NEOPLASM, MOUTH (Right)  TRACHEOTOMY (N/A)  CREATION, FREE FLAP (Right)  INSERTION, PEG TUBE (N/A)   8 Days Post-Op  Hospital Day: 9     Interval History: No issues overnight. Tolerated trach capping x24 hours.     Objective:     Vital Signs (24h Range):  Temp:  [97.5 °F (36.4 °C)-99.6 °F (37.6 °C)] 97.8 °F (36.6 °C)  Pulse:  [69-86] 83  Resp:  [13-20] 20  SpO2:  [93 %-96 %] 93 %  BP: (116-129)/(56-62) 119/57     Physical Exam  Awake, follows commands, responds appropriately  Face symmetric  Intra-oral flap appears perfused and viable, suture lines intact  Neck incision approximated with staples  Neck is soft, no fluid collections, Right RENETTA drain with sanguinous output  6-0 cuffless trach in place,   capped  Chest incision approximated with staples, Jpx2 in place with sanguinous output  Abdomen soft, nt, G tube in place      Assessment/Plan:     * Malignant neoplasm of oral cavity  Recurrent SCC of OC s/p composite resection and Pec flap reconstruction on 9/28    - trachea decannulated, occlusive dressing  - neck and 1/2 chest drains harvested. Discharge with chest drain.   - NPO  - bolus feeds  - lovenox   - PT/OT    Disposition: discharge to home today

## 2022-10-06 NOTE — PT/OT/SLP PROGRESS
Speech Language Pathology      Jennifer Andre  MRN: [id number]    Speaking valve eval not needed as patient decannulated this AM. Please re-consult ST if warranted.     Kuldip Arroyo CCC-SLP  Speech-Language Pathology  Pager: [phone]

## 2022-10-06 NOTE — ASSESSMENT & PLAN NOTE
Recurrent SCC of OC s/p composite resection and Pec flap reconstruction on 9/28    - trachea decannulated, occlusive dressing  - neck and 1/2 chest drains harvested. Discharge with chest drain.   - NPO  - bolus feeds  - lovenox   - PT/OT    Disposition: discharge to home today

## 2022-10-06 NOTE — PLAN OF CARE
[facility]  Discharge Final Note    Primary Care Provider: Marcello Benavidez MD    Expected Discharge Date: 10/6/2022    Discharge Home with Home Health  Enteral Supplies to be provided by [place], [place]    Final Discharge Note (most recent)       Final Note - 10/06/22 1342          Final Note    Assessment Type Final Discharge Note     Anticipated Discharge Disposition [place] [facility]     What phone number can be called within the next 1-3 days to see how you are doing after discharge? [phone]     Hospital Resources/Appts/Education Provided Post-Acute resouces added to AVS        Post-Acute Status    Post-Acute Authorization Home Health     Home Health Status Set-up Complete/Auth obtained     Coverage [facility] Medicare     Discharge Delays None known at this time                     Important Message from Medicare  Important Message from Medicare regarding Discharge Appeal Rights: Given to patient/caregiver, Explained to patient/caregiver, Signed/date by patient/caregiver     Date IMM was signed: 10/05/22  Time IMM was signed: 1152    Contact [address] [address]   Phone: [phone]       Next Steps: Follow up    Instructions: Please call to arrange schedule, if you have not heard from them directly within 24 hours.          EDITH JohnsonN, RN    EXT [phone]

## 2022-10-07 ENCOUNTER — PATIENT OUTREACH (OUTPATIENT)
Dept: ADMINISTRATIVE | Facility: CLINIC | Age: 67
End: 2022-10-07
Payer: MEDICARE

## 2022-10-07 NOTE — PROGRESS NOTES
C3 nurse attempted to contact Jennifer Andre  for a TCC post hospital discharge follow up call. No answer. The patient does not have a scheduled HOSFU appointment, and the pt does not have an [facility] PCP.

## 2022-10-10 NOTE — PROGRESS NOTES
C3 Nurse made second attempt to reach patient for TCC call. Left voicemail asking patient to please call [phone] and leave first name, last name, and , and Betty will return call.

## 2022-10-10 NOTE — PROGRESS NOTES
C3 Nurse made third attempt to reach patient for TCC call. Left voicemail asking patient to please call [phone] and leave first name, last name, and , and Betty will return call.

## 2022-10-13 ENCOUNTER — OFFICE VISIT (OUTPATIENT)
Dept: OTOLARYNGOLOGY | Facility: CLINIC | Age: 67
End: 2022-10-13
Payer: MEDICARE

## 2022-10-13 VITALS — HEART RATE: 105 BPM | SYSTOLIC BLOOD PRESSURE: 102 MMHG | DIASTOLIC BLOOD PRESSURE: 65 MMHG

## 2022-10-13 DIAGNOSIS — C06.9 MALIGNANT NEOPLASM OF ORAL CAVITY: Primary | ICD-10-CM

## 2022-10-13 PROCEDURE — 3074F PR MOST RECENT SYSTOLIC BLOOD PRESSURE < 130 MM HG: ICD-10-PCS | Mod: CPTII,S$GLB,, | Performed by: NURSE PRACTITIONER

## 2022-10-13 PROCEDURE — 1126F PR PAIN SEVERITY QUANTIFIED, NO PAIN PRESENT: ICD-10-PCS | Mod: CPTII,S$GLB,, | Performed by: NURSE PRACTITIONER

## 2022-10-13 PROCEDURE — 1159F MED LIST DOCD IN RCRD: CPT | Mod: CPTII,S$GLB,, | Performed by: NURSE PRACTITIONER

## 2022-10-13 PROCEDURE — 99024 POSTOP FOLLOW-UP VISIT: CPT | Mod: S$GLB,,, | Performed by: NURSE PRACTITIONER

## 2022-10-13 PROCEDURE — 3074F SYST BP LT 130 MM HG: CPT | Mod: CPTII,S$GLB,, | Performed by: NURSE PRACTITIONER

## 2022-10-13 PROCEDURE — 99999 PR PBB SHADOW E&M-EST. PATIENT-LVL II: CPT | Mod: PBBFAC,,, | Performed by: NURSE PRACTITIONER

## 2022-10-13 PROCEDURE — 99024 PR POST-OP FOLLOW-UP VISIT: ICD-10-PCS | Mod: S$GLB,,, | Performed by: NURSE PRACTITIONER

## 2022-10-13 PROCEDURE — 99999 PR PBB SHADOW E&M-EST. PATIENT-LVL II: ICD-10-PCS | Mod: PBBFAC,,, | Performed by: NURSE PRACTITIONER

## 2022-10-13 PROCEDURE — 1159F PR MEDICATION LIST DOCUMENTED IN MEDICAL RECORD: ICD-10-PCS | Mod: CPTII,S$GLB,, | Performed by: NURSE PRACTITIONER

## 2022-10-13 PROCEDURE — 3078F DIAST BP <80 MM HG: CPT | Mod: CPTII,S$GLB,, | Performed by: NURSE PRACTITIONER

## 2022-10-13 PROCEDURE — 3078F PR MOST RECENT DIASTOLIC BLOOD PRESSURE < 80 MM HG: ICD-10-PCS | Mod: CPTII,S$GLB,, | Performed by: NURSE PRACTITIONER

## 2022-10-13 PROCEDURE — 1126F AMNT PAIN NOTED NONE PRSNT: CPT | Mod: CPTII,S$GLB,, | Performed by: NURSE PRACTITIONER

## 2022-10-13 RX ORDER — ONDANSETRON HYDROCHLORIDE 8 MG/1
8 TABLET, FILM COATED ORAL EVERY 8 HOURS PRN
Qty: 15 TABLET | Refills: 1 | Status: SHIPPED | OUTPATIENT
Start: 2022-10-13 | End: 2022-12-06

## 2022-10-13 NOTE — ASSESSMENT & PLAN NOTE
Healing well overall. Discussed packing small open wound on chin with saline dampened guaze bid. Will leave staples in for another week. She should remain NPO for now. Questions answered. RTC in 2 weeks, sooner if needed.

## 2022-10-13 NOTE — PROGRESS NOTES
Chief Complaint   Patient presents with    Post-op Evaluation     Oncology History   Malignant neoplasm of oral cavity   2013 Surgery    L Segmental mandibulectomy, chemo/RT - [facility] - 2013 2013 Initial Diagnosis    Malignant neoplasm of oral cavity     2015 Surgery     L mandible ORN, osteocutaneous RFFF - [facility] - ~2015 4/2018 Biopsy    Biopsy R RMT - [place] - 04/2018 5/30/2019 Surgery    1.  Composite resection of the right mandible, buccal mucosa and floor of mouth  2.  Right modified neck dissection including levels 1 B through 4  3.  Right fibula free flap with inset into the mouth.  4.  Mandibular reconstruction with transosteal plate.  5.  Right condylar reconstruction.  6.  Site preparation for flap inset.  7.  Tracheostomy.  8.  Neck exploration for vessels with anastomosis into the right facial artery and right common facial vein.  9.  Split-thickness skin graft from the leg measuring 15 x 6 cm.  10.  AlloDerm with inset into the right glenoid fossa.     7/9/2019 Cancer Staged    Staging form: Oral Cavity, AJCC 8th Edition  - Pathologic: Stage I (pT1, pN0, cM0) - Signed by Isabella Perea NP on 7/9/2019 9/28/2022 Surgery    1. Revision tracheostomy   2. Right partial glossectomy with resection of floor of mouth/buccal mucosa/mandibular gingiva/tongue base and right soft palate  3. Right pectoralis major myocutaneous regional flap for coverage of oral cavity defect 11x 6 cm  4. Complex vestibuloplasty   5. Pharyngoplasty   6.  Advancement flap for closure of secondary chest defect with advanced area being 20 x 22 cm    7. Tracheostomy tube exchange     Squamous cell carcinoma   5/30/2019 Initial Diagnosis    Squamous cell carcinoma           HPI   67 y.o. female presents with the above treatment history.  She has been doing well since her recent surgery. Pain is well controlled. She get nauseated from the tube feeds at times, otherwise no complaints.     Review of Systems   Constitutional:  Negative for fatigue and unexpected weight change.   HENT: Per HPI.  Eyes: Negative for visual disturbance.   Respiratory: Negative for shortness of breath, hemoptysis   Cardiovascular: Negative for chest pain and palpitations.   Musculoskeletal: Negative for decreased ROM, back pain.   Skin: Negative for rash, sunburn, itching.   Neurological: Negative for dizziness and seizures.   Hematological: Negative for adenopathy. Does not bruise/bleed easily.   Endocrine: Negative for rapid weight loss/weight gain, heat/cold intolerance.     Past Medical History   Patient Active Problem List   Diagnosis    Malignant neoplasm of oral cavity    Squamous cell carcinoma    Moderate malnutrition    Gastroesophageal reflux disease    Decreased mobility    Open wound of mouth    Open wound of right chest wall    Open wound of throat           Past Surgical History   Past Surgical History:   Procedure Laterality Date    APPENDECTOMY      CHOLECYSTECTOMY      DISSECTION OF NECK Right 5/30/2019    Procedure: DISSECTION, NECK;  Surgeon: Wallace Santiago MD;  Location: [address];  Service: ENT;  Laterality: Right;    ESOPHAGOGASTRODUODENOSCOPY W/ PEG  5/30/2019    Procedure: EGD, WITH PEG TUBE INSERTION;  Surgeon: Ottoniel Jimenez MD;  Location: [address];  Service: General;;    FLAP PROCEDURE Right 5/30/2019    Procedure: CREATION, FREE FLAP;  Surgeon: Navdeep Dangelo MD;  Location: [address];  Service: Plastics;  Laterality: Right;  ischemia time 5727-0383  flap rt lower leg to rt neck    FLAP PROCEDURE Right 9/28/2022    Procedure: CREATION, FREE FLAP;  Surgeon: Milly Price MD;  Location: [address];  Service: ENT;  Laterality: Right;    HYSTERECTOMY      MANDIBLE SURGERY      SURGICAL REMOVAL OF NEOPLASM OF MOUTH Right 9/28/2022    Procedure: EXCISION, NEOPLASM, MOUTH;  Surgeon: Wallace Santiago MD;  Location: [address];  Service: ENT;  Laterality: Right;    TRACHEOTOMY N/A 5/30/2019     Procedure: TRACHEOTOMY;  Surgeon: Navdeep Dangelo MD;  Location: [address];  Service: Plastics;  Laterality: N/A;    TRACHEOTOMY N/A 9/28/2022    Procedure: TRACHEOTOMY;  Surgeon: Wallace Santiago MD;  Location: [facility] [address] [place] [address];  Service: ENT;  Laterality: N/A;         Family History   No family history on file.        Social History   .  Social History     Socioeconomic History    Marital status:    Tobacco Use    Smoking status: Former    Smokeless tobacco: Never   Substance and Sexual Activity    Alcohol use: Never    Drug use: Never     Social Determinants of Health     Financial Resource Strain: High Risk    Difficulty of Paying Living Expenses: Hard   Food Insecurity: Food Insecurity Present    Worried About Running Out of Food in the Last Year: Sometimes true    Ran Out of Food in the Last Year: Sometimes true   Transportation Needs: No Transportation Needs    Lack of Transportation (Medical): No    Lack of Transportation (Non-Medical): No   Physical Activity: Inactive    Days of Exercise per Week: 0 days    Minutes of Exercise per Session: 0 min   Stress: No Stress Concern Present    Feeling of Stress : Only a little   Social Connections: Moderately Isolated    Frequency of Communication with Friends and Family: More than three times a week    Frequency of Social Gatherings with Friends and Family: More than three times a week    Attends [employer] Services: Never    Active Member of Clubs or Organizations: No    Attends Club or Organization Meetings: Never    Marital Status:    Housing Stability: High Risk    Unable to Pay for Housing in the Last Year: Yes    Number of Places Lived in the Last Year: 1    Unstable Housing in the Last Year: No         Allergies   Review of patient's allergies indicates:   Allergen Reactions    Bactrim [sulfamethoxazole-trimethoprim]     Clindamycin     Keflex [cephalexin]      Tolerated Unasyn 05/31/2019    Tramadol      DIZZY AND NAUSEA, & VOMITTING            Physical Exam     Vitals:    10/13/22 1410   BP: 102/65   Pulse: 105         There is no height or weight on file to calculate BMI.      General: AOx3, NAD   Respiratory:  Symmetric chest rise, normal effort  Oral Cavity:  Oral Tongue mobile.  Skin paddle debrided. Healthy tissue below.  Oropharynx:  No masses/lesions of the posterior pharyngeal wall. Tonsillar fossa without lesions. Soft palate without masses. Midline uvula.   Neck:  Well-healed neck dissection/trach scars.  Marked fibrosis status post surgery and radiation..  Incision CDI.  No redness, drainage, or fluid collection noted.  Edges well approximated. Staples intact.  Small shallow opening above incision. Does not track into mouth.  Face: House Brackmann I bilaterally.       Assessment/Plan  Problem List Items Addressed This Visit          Oncology    Malignant neoplasm of oral cavity - Primary     Healing well overall. Discussed packing small open wound on chin with saline dampened guaze bid. Will leave staples in for another week. She should remain NPO for now. Questions answered. RTC in 2 weeks, sooner if needed.         Relevant Medications    ondansetron (ZOFRAN) 8 MG tablet

## 2022-10-15 ENCOUNTER — TELEPHONE (OUTPATIENT)
Dept: ADMINISTRATIVE | Facility: CLINIC | Age: 67
End: 2022-10-15
Payer: MEDICARE

## 2022-10-27 ENCOUNTER — OFFICE VISIT (OUTPATIENT)
Dept: OTOLARYNGOLOGY | Facility: CLINIC | Age: 67
End: 2022-10-27
Payer: MEDICARE

## 2022-10-27 VITALS
BODY MASS INDEX: 21.08 KG/M2 | SYSTOLIC BLOOD PRESSURE: 105 MMHG | DIASTOLIC BLOOD PRESSURE: 59 MMHG | TEMPERATURE: 70 F | WEIGHT: 119 LBS

## 2022-10-27 DIAGNOSIS — C06.9 MALIGNANT NEOPLASM OF ORAL CAVITY: Primary | ICD-10-CM

## 2022-10-27 PROCEDURE — 3078F DIAST BP <80 MM HG: CPT | Mod: CPTII,S$GLB,, | Performed by: NURSE PRACTITIONER

## 2022-10-27 PROCEDURE — 99024 POSTOP FOLLOW-UP VISIT: CPT | Mod: S$GLB,,, | Performed by: NURSE PRACTITIONER

## 2022-10-27 PROCEDURE — 3078F PR MOST RECENT DIASTOLIC BLOOD PRESSURE < 80 MM HG: ICD-10-PCS | Mod: CPTII,S$GLB,, | Performed by: NURSE PRACTITIONER

## 2022-10-27 PROCEDURE — 1159F PR MEDICATION LIST DOCUMENTED IN MEDICAL RECORD: ICD-10-PCS | Mod: CPTII,S$GLB,, | Performed by: NURSE PRACTITIONER

## 2022-10-27 PROCEDURE — 99999 PR PBB SHADOW E&M-EST. PATIENT-LVL II: CPT | Mod: PBBFAC,,, | Performed by: NURSE PRACTITIONER

## 2022-10-27 PROCEDURE — 1159F MED LIST DOCD IN RCRD: CPT | Mod: CPTII,S$GLB,, | Performed by: NURSE PRACTITIONER

## 2022-10-27 PROCEDURE — 1126F PR PAIN SEVERITY QUANTIFIED, NO PAIN PRESENT: ICD-10-PCS | Mod: CPTII,S$GLB,, | Performed by: NURSE PRACTITIONER

## 2022-10-27 PROCEDURE — 99024 PR POST-OP FOLLOW-UP VISIT: ICD-10-PCS | Mod: S$GLB,,, | Performed by: NURSE PRACTITIONER

## 2022-10-27 PROCEDURE — 3074F SYST BP LT 130 MM HG: CPT | Mod: CPTII,S$GLB,, | Performed by: NURSE PRACTITIONER

## 2022-10-27 PROCEDURE — 99999 PR PBB SHADOW E&M-EST. PATIENT-LVL II: ICD-10-PCS | Mod: PBBFAC,,, | Performed by: NURSE PRACTITIONER

## 2022-10-27 PROCEDURE — 3074F PR MOST RECENT SYSTOLIC BLOOD PRESSURE < 130 MM HG: ICD-10-PCS | Mod: CPTII,S$GLB,, | Performed by: NURSE PRACTITIONER

## 2022-10-27 PROCEDURE — 1126F AMNT PAIN NOTED NONE PRSNT: CPT | Mod: CPTII,S$GLB,, | Performed by: NURSE PRACTITIONER

## 2022-10-27 NOTE — PROGRESS NOTES
Chief Complaint   Patient presents with    Post-op Evaluation       Oncology History   Malignant neoplasm of oral cavity   2013 Surgery    L Segmental mandibulectomy, chemo/RT - [facility] - 2013 2013 Initial Diagnosis    Malignant neoplasm of oral cavity     2015 Surgery     L mandible ORN, osteocutaneous RFFF - [facility] - ~2015 4/2018 Biopsy    Biopsy R RMT - [place] - 04/2018 5/30/2019 Surgery    1.  Composite resection of the right mandible, buccal mucosa and floor of mouth  2.  Right modified neck dissection including levels 1 B through 4  3.  Right fibula free flap with inset into the mouth.  4.  Mandibular reconstruction with transosteal plate.  5.  Right condylar reconstruction.  6.  Site preparation for flap inset.  7.  Tracheostomy.  8.  Neck exploration for vessels with anastomosis into the right facial artery and right common facial vein.  9.  Split-thickness skin graft from the leg measuring 15 x 6 cm.  10.  AlloDerm with inset into the right glenoid fossa.     7/9/2019 Cancer Staged    Staging form: Oral Cavity, AJCC 8th Edition  - Pathologic: Stage I (pT1, pN0, cM0) - Signed by Isabella Perea NP on 7/9/2019 9/28/2022 Surgery    1. Revision tracheostomy   2. Right partial glossectomy with resection of floor of mouth/buccal mucosa/mandibular gingiva/tongue base and right soft palate  3. Right pectoralis major myocutaneous regional flap for coverage of oral cavity defect 11x 6 cm  4. Complex vestibuloplasty   5. Pharyngoplasty   6.  Advancement flap for closure of secondary chest defect with advanced area being 20 x 22 cm    7. Tracheostomy tube exchange     Squamous cell carcinoma   5/30/2019 Initial Diagnosis    Squamous cell carcinoma           HPI   67 y.o. female presents with the above treatment history.  She has been doing well since her recent surgery. Pain is well controlled. She get nauseated from the tube feeds at times, otherwise no complaints.     Review of Systems    Constitutional: Negative for fatigue and unexpected weight change.   HENT: Per HPI.  Eyes: Negative for visual disturbance.   Respiratory: Negative for shortness of breath, hemoptysis   Cardiovascular: Negative for chest pain and palpitations.   Musculoskeletal: Negative for decreased ROM, back pain.   Skin: Negative for rash, sunburn, itching.   Neurological: Negative for dizziness and seizures.   Hematological: Negative for adenopathy. Does not bruise/bleed easily.   Endocrine: Negative for rapid weight loss/weight gain, heat/cold intolerance.     Past Medical History   Patient Active Problem List   Diagnosis    Malignant neoplasm of oral cavity    Squamous cell carcinoma    Moderate malnutrition    Gastroesophageal reflux disease    Decreased mobility    Open wound of mouth    Open wound of right chest wall    Open wound of throat           Past Surgical History   Past Surgical History:   Procedure Laterality Date    APPENDECTOMY      CHOLECYSTECTOMY      DISSECTION OF NECK Right 5/30/2019    Procedure: DISSECTION, NECK;  Surgeon: Wallace Santiago MD;  Location: [address];  Service: ENT;  Laterality: Right;    ESOPHAGOGASTRODUODENOSCOPY W/ PEG  5/30/2019    Procedure: EGD, WITH PEG TUBE INSERTION;  Surgeon: Ottoniel Jimenez MD;  Location: [address];  Service: General;;    FLAP PROCEDURE Right 5/30/2019    Procedure: CREATION, FREE FLAP;  Surgeon: Navdeep Dangelo MD;  Location: [address];  Service: Plastics;  Laterality: Right;  ischemia time 1307-9615  flap rt lower leg to rt neck    FLAP PROCEDURE Right 9/28/2022    Procedure: CREATION, FREE FLAP;  Surgeon: Milly Price MD;  Location: [address];  Service: ENT;  Laterality: Right;    HYSTERECTOMY      MANDIBLE SURGERY      SURGICAL REMOVAL OF NEOPLASM OF MOUTH Right 9/28/2022    Procedure: EXCISION, NEOPLASM, MOUTH;  Surgeon: Wallace Santiago MD;  Location: [facility] OR [address] [place] [address];  Service: ENT;  Laterality: Right;    TRACHEOTOMY N/A  5/30/2019    Procedure: TRACHEOTOMY;  Surgeon: Navdeep Dangelo MD;  Location: [address];  Service: Plastics;  Laterality: N/A;    TRACHEOTOMY N/A 9/28/2022    Procedure: TRACHEOTOMY;  Surgeon: Wallace Santiago MD;  Location: [facility] OR [address] [place] [address];  Service: ENT;  Laterality: N/A;         Family History   No family history on file.        Social History   .  Social History     Socioeconomic History    Marital status:    Tobacco Use    Smoking status: Former    Smokeless tobacco: Never   Substance and Sexual Activity    Alcohol use: Never    Drug use: Never     Social Determinants of Health     Financial Resource Strain: High Risk    Difficulty of Paying Living Expenses: Hard   Food Insecurity: Food Insecurity Present    Worried About Running Out of Food in the Last Year: Sometimes true    Ran Out of Food in the Last Year: Sometimes true   Transportation Needs: No Transportation Needs    Lack of Transportation (Medical): No    Lack of Transportation (Non-Medical): No   Physical Activity: Inactive    Days of Exercise per Week: 0 days    Minutes of Exercise per Session: 0 min   Stress: No Stress Concern Present    Feeling of Stress : Only a little   Social Connections: Moderately Isolated    Frequency of Communication with Friends and Family: More than three times a week    Frequency of Social Gatherings with Friends and Family: More than three times a week    Attends [employer] Services: Never    Active Member of Clubs or Organizations: No    Attends Club or Organization Meetings: Never    Marital Status:    Housing Stability: High Risk    Unable to Pay for Housing in the Last Year: Yes    Number of Places Lived in the Last Year: 1    Unstable Housing in the Last Year: No         Allergies   Review of patient's allergies indicates:   Allergen Reactions    Bactrim [sulfamethoxazole-trimethoprim]     Clindamycin     Keflex [cephalexin]      Tolerated Unasyn 05/31/2019    Tramadol      DIZZY AND NAUSEA, &  VOMITTING           Physical Exam     Vitals:    10/27/22 1337   BP: (!) 105/59   Temp: (!) 70 °F (21.1 °C)           Body mass index is 21.08 kg/m².      General: AOx3, NAD   Respiratory:  Symmetric chest rise, normal effort  Oral Cavity:  Oral Tongue mobile.    Oropharynx:  No masses/lesions of the posterior pharyngeal wall. Tonsillar fossa without lesions. Soft palate without masses. Midline uvula.   Neck:  Well-healed neck dissection/trach scars.  Marked fibrosis status post surgery and radiation..  Incision CDI.  No redness, drainage, or fluid collection noted.  Edges well approximated. Majority of staples removed. Small fistula from left chin into floor of mouth.  Face: House Brackmann I bilaterally.       Assessment/Plan  Problem List Items Addressed This Visit          Oncology    Malignant neoplasm of oral cavity - Primary     Healing well overall. Discussed packing small open wound on chin with saline dampened guaze bid. Will leave staples in for another week. She should remain NPO for now. Questions answered. RTC in 2 weeks, sooner if needed.

## 2022-11-01 ENCOUNTER — OFFICE VISIT (OUTPATIENT)
Dept: SURGICAL ONCOLOGY | Facility: CLINIC | Age: 67
End: 2022-11-01
Payer: MEDICARE

## 2022-11-01 ENCOUNTER — PATIENT MESSAGE (OUTPATIENT)
Dept: OTOLARYNGOLOGY | Facility: CLINIC | Age: 67
End: 2022-11-01
Payer: MEDICARE

## 2022-11-01 VITALS — BODY MASS INDEX: 21.17 KG/M2 | HEIGHT: 63 IN | WEIGHT: 119.5 LBS

## 2022-11-01 DIAGNOSIS — C06.9 MALIGNANT NEOPLASM OF ORAL CAVITY: Primary | ICD-10-CM

## 2022-11-01 PROCEDURE — 1111F DSCHRG MED/CURRENT MED MERGE: CPT | Mod: CPTII,S$GLB,, | Performed by: OTOLARYNGOLOGY

## 2022-11-01 PROCEDURE — 1159F PR MEDICATION LIST DOCUMENTED IN MEDICAL RECORD: ICD-10-PCS | Mod: CPTII,S$GLB,, | Performed by: OTOLARYNGOLOGY

## 2022-11-01 PROCEDURE — 99999 PR PBB SHADOW E&M-EST. PATIENT-LVL III: CPT | Mod: PBBFAC,,, | Performed by: OTOLARYNGOLOGY

## 2022-11-01 PROCEDURE — 3288F PR FALLS RISK ASSESSMENT DOCUMENTED: ICD-10-PCS | Mod: CPTII,S$GLB,, | Performed by: OTOLARYNGOLOGY

## 2022-11-01 PROCEDURE — 1126F PR PAIN SEVERITY QUANTIFIED, NO PAIN PRESENT: ICD-10-PCS | Mod: CPTII,S$GLB,, | Performed by: OTOLARYNGOLOGY

## 2022-11-01 PROCEDURE — 1101F PR PT FALLS ASSESS DOC 0-1 FALLS W/OUT INJ PAST YR: ICD-10-PCS | Mod: CPTII,S$GLB,, | Performed by: OTOLARYNGOLOGY

## 2022-11-01 PROCEDURE — 1160F RVW MEDS BY RX/DR IN RCRD: CPT | Mod: CPTII,S$GLB,, | Performed by: OTOLARYNGOLOGY

## 2022-11-01 PROCEDURE — 99024 PR POST-OP FOLLOW-UP VISIT: ICD-10-PCS | Mod: S$GLB,,, | Performed by: OTOLARYNGOLOGY

## 2022-11-01 PROCEDURE — 1160F PR REVIEW ALL MEDS BY PRESCRIBER/CLIN PHARMACIST DOCUMENTED: ICD-10-PCS | Mod: CPTII,S$GLB,, | Performed by: OTOLARYNGOLOGY

## 2022-11-01 PROCEDURE — 1159F MED LIST DOCD IN RCRD: CPT | Mod: CPTII,S$GLB,, | Performed by: OTOLARYNGOLOGY

## 2022-11-01 PROCEDURE — 3288F FALL RISK ASSESSMENT DOCD: CPT | Mod: CPTII,S$GLB,, | Performed by: OTOLARYNGOLOGY

## 2022-11-01 PROCEDURE — 99024 POSTOP FOLLOW-UP VISIT: CPT | Mod: S$GLB,,, | Performed by: OTOLARYNGOLOGY

## 2022-11-01 PROCEDURE — 99999 PR PBB SHADOW E&M-EST. PATIENT-LVL III: ICD-10-PCS | Mod: PBBFAC,,, | Performed by: OTOLARYNGOLOGY

## 2022-11-01 PROCEDURE — 1101F PT FALLS ASSESS-DOCD LE1/YR: CPT | Mod: CPTII,S$GLB,, | Performed by: OTOLARYNGOLOGY

## 2022-11-01 PROCEDURE — 1126F AMNT PAIN NOTED NONE PRSNT: CPT | Mod: CPTII,S$GLB,, | Performed by: OTOLARYNGOLOGY

## 2022-11-01 PROCEDURE — 1111F PR DISCHARGE MEDS RECONCILED W/ CURRENT OUTPATIENT MED LIST: ICD-10-PCS | Mod: CPTII,S$GLB,, | Performed by: OTOLARYNGOLOGY

## 2022-11-01 NOTE — PROGRESS NOTES
Jennifer Andre Presents 1 month status post composite resection of the oral soft tissues and pectoralis flap reconstruction.  Doing well overall.  She is to obtain a small orocutaneous fistula.      On exam, her neck incision is healed nicely.  Staples were removed.  There was a small fistula of the left anterior floor of mouth which I packed with dry gauze.  She is resume wet-to-dry dressings upon returning home.  Overall, there is minimal drainage from this site.      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  Most recently status post surgical resection and pectoralis flap reconstruction.  She is healing reasonably well.  I expect her fistula will close with wound care.  She will return to see me in 2 weeks.

## 2022-11-17 ENCOUNTER — OFFICE VISIT (OUTPATIENT)
Dept: OTOLARYNGOLOGY | Facility: CLINIC | Age: 67
End: 2022-11-17
Payer: MEDICARE

## 2022-11-17 VITALS
WEIGHT: 117.06 LBS | DIASTOLIC BLOOD PRESSURE: 67 MMHG | HEART RATE: 59 BPM | BODY MASS INDEX: 20.74 KG/M2 | SYSTOLIC BLOOD PRESSURE: 111 MMHG

## 2022-11-17 DIAGNOSIS — C06.9 MALIGNANT NEOPLASM OF ORAL CAVITY: Primary | ICD-10-CM

## 2022-11-17 PROCEDURE — 99999 PR PBB SHADOW E&M-EST. PATIENT-LVL III: CPT | Mod: PBBFAC,,, | Performed by: OTOLARYNGOLOGY

## 2022-11-17 PROCEDURE — 1126F PR PAIN SEVERITY QUANTIFIED, NO PAIN PRESENT: ICD-10-PCS | Mod: CPTII,S$GLB,, | Performed by: OTOLARYNGOLOGY

## 2022-11-17 PROCEDURE — 1159F PR MEDICATION LIST DOCUMENTED IN MEDICAL RECORD: ICD-10-PCS | Mod: CPTII,S$GLB,, | Performed by: OTOLARYNGOLOGY

## 2022-11-17 PROCEDURE — 99024 POSTOP FOLLOW-UP VISIT: CPT | Mod: S$GLB,,, | Performed by: OTOLARYNGOLOGY

## 2022-11-17 PROCEDURE — 3288F PR FALLS RISK ASSESSMENT DOCUMENTED: ICD-10-PCS | Mod: CPTII,S$GLB,, | Performed by: OTOLARYNGOLOGY

## 2022-11-17 PROCEDURE — 3074F PR MOST RECENT SYSTOLIC BLOOD PRESSURE < 130 MM HG: ICD-10-PCS | Mod: CPTII,S$GLB,, | Performed by: OTOLARYNGOLOGY

## 2022-11-17 PROCEDURE — 1101F PR PT FALLS ASSESS DOC 0-1 FALLS W/OUT INJ PAST YR: ICD-10-PCS | Mod: CPTII,S$GLB,, | Performed by: OTOLARYNGOLOGY

## 2022-11-17 PROCEDURE — 1126F AMNT PAIN NOTED NONE PRSNT: CPT | Mod: CPTII,S$GLB,, | Performed by: OTOLARYNGOLOGY

## 2022-11-17 PROCEDURE — 99024 PR POST-OP FOLLOW-UP VISIT: ICD-10-PCS | Mod: S$GLB,,, | Performed by: OTOLARYNGOLOGY

## 2022-11-17 PROCEDURE — 1159F MED LIST DOCD IN RCRD: CPT | Mod: CPTII,S$GLB,, | Performed by: OTOLARYNGOLOGY

## 2022-11-17 PROCEDURE — 3288F FALL RISK ASSESSMENT DOCD: CPT | Mod: CPTII,S$GLB,, | Performed by: OTOLARYNGOLOGY

## 2022-11-17 PROCEDURE — 3008F BODY MASS INDEX DOCD: CPT | Mod: CPTII,S$GLB,, | Performed by: OTOLARYNGOLOGY

## 2022-11-17 PROCEDURE — 99999 PR PBB SHADOW E&M-EST. PATIENT-LVL III: ICD-10-PCS | Mod: PBBFAC,,, | Performed by: OTOLARYNGOLOGY

## 2022-11-17 PROCEDURE — 1160F RVW MEDS BY RX/DR IN RCRD: CPT | Mod: CPTII,S$GLB,, | Performed by: OTOLARYNGOLOGY

## 2022-11-17 PROCEDURE — 3074F SYST BP LT 130 MM HG: CPT | Mod: CPTII,S$GLB,, | Performed by: OTOLARYNGOLOGY

## 2022-11-17 PROCEDURE — 3008F PR BODY MASS INDEX (BMI) DOCUMENTED: ICD-10-PCS | Mod: CPTII,S$GLB,, | Performed by: OTOLARYNGOLOGY

## 2022-11-17 PROCEDURE — 1160F PR REVIEW ALL MEDS BY PRESCRIBER/CLIN PHARMACIST DOCUMENTED: ICD-10-PCS | Mod: CPTII,S$GLB,, | Performed by: OTOLARYNGOLOGY

## 2022-11-17 PROCEDURE — 3078F PR MOST RECENT DIASTOLIC BLOOD PRESSURE < 80 MM HG: ICD-10-PCS | Mod: CPTII,S$GLB,, | Performed by: OTOLARYNGOLOGY

## 2022-11-17 PROCEDURE — 3078F DIAST BP <80 MM HG: CPT | Mod: CPTII,S$GLB,, | Performed by: OTOLARYNGOLOGY

## 2022-11-17 PROCEDURE — 1101F PT FALLS ASSESS-DOCD LE1/YR: CPT | Mod: CPTII,S$GLB,, | Performed by: OTOLARYNGOLOGY

## 2022-11-17 RX ORDER — TRIPROLIDINE/PSEUDOEPHEDRINE 2.5MG-60MG
400 TABLET ORAL EVERY 6 HOURS PRN
Qty: 473 ML | Refills: 0 | Status: SHIPPED | OUTPATIENT
Start: 2022-11-17

## 2022-11-17 RX ORDER — GUAIFENESIN 100 MG/5ML
200 SOLUTION ORAL 3 TIMES DAILY PRN
Qty: 473 ML | Refills: 0 | Status: SHIPPED | OUTPATIENT
Start: 2022-11-17 | End: 2022-12-03

## 2022-11-17 RX ORDER — PANTOPRAZOLE SODIUM 40 MG/1
40 FOR SUSPENSION ORAL DAILY
Qty: 30 PACKET | Refills: 11 | Status: SHIPPED | OUTPATIENT
Start: 2022-11-17 | End: 2023-11-17

## 2022-11-17 NOTE — PROGRESS NOTES
Jennifer Andre Presents nearly 2 months status post composite resection of the oral soft tissues and pectoralis flap reconstruction.  Doing well overall.  She is to obtain a small orocutaneous fistula.      On exam, her neck incision is healed nicely.  Her flap is healthy.  There is a small residual fistula the left anterior floor of mouth communicated with the neck.  This was packed with wet-to-dry gauze.  Per her daughter, this fistula is requiring less packing and noted at last visit.      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  Most recently status post surgical resection and pectoralis flap reconstruction.  She is healing reasonably well.  I expect her fistula will close with wound care.  She will return to see me in 2 weeks.

## 2022-11-18 ENCOUNTER — TELEPHONE (OUTPATIENT)
Dept: PHARMACY | Facility: CLINIC | Age: 67
End: 2022-11-18
Payer: MEDICARE

## 2022-12-06 ENCOUNTER — OFFICE VISIT (OUTPATIENT)
Dept: SURGICAL ONCOLOGY | Facility: CLINIC | Age: 67
End: 2022-12-06
Payer: MEDICARE

## 2022-12-06 VITALS — HEIGHT: 63 IN | BODY MASS INDEX: 20.71 KG/M2 | WEIGHT: 116.88 LBS

## 2022-12-06 DIAGNOSIS — C06.9 MALIGNANT NEOPLASM OF ORAL CAVITY: Primary | ICD-10-CM

## 2022-12-06 PROCEDURE — 1101F PT FALLS ASSESS-DOCD LE1/YR: CPT | Mod: CPTII,S$GLB,, | Performed by: OTOLARYNGOLOGY

## 2022-12-06 PROCEDURE — 1101F PR PT FALLS ASSESS DOC 0-1 FALLS W/OUT INJ PAST YR: ICD-10-PCS | Mod: CPTII,S$GLB,, | Performed by: OTOLARYNGOLOGY

## 2022-12-06 PROCEDURE — 3288F PR FALLS RISK ASSESSMENT DOCUMENTED: ICD-10-PCS | Mod: CPTII,S$GLB,, | Performed by: OTOLARYNGOLOGY

## 2022-12-06 PROCEDURE — 99999 PR PBB SHADOW E&M-EST. PATIENT-LVL III: ICD-10-PCS | Mod: PBBFAC,,, | Performed by: OTOLARYNGOLOGY

## 2022-12-06 PROCEDURE — 99024 PR POST-OP FOLLOW-UP VISIT: ICD-10-PCS | Mod: S$GLB,,, | Performed by: OTOLARYNGOLOGY

## 2022-12-06 PROCEDURE — 3008F PR BODY MASS INDEX (BMI) DOCUMENTED: ICD-10-PCS | Mod: CPTII,S$GLB,, | Performed by: OTOLARYNGOLOGY

## 2022-12-06 PROCEDURE — 1125F AMNT PAIN NOTED PAIN PRSNT: CPT | Mod: CPTII,S$GLB,, | Performed by: OTOLARYNGOLOGY

## 2022-12-06 PROCEDURE — 1159F MED LIST DOCD IN RCRD: CPT | Mod: CPTII,S$GLB,, | Performed by: OTOLARYNGOLOGY

## 2022-12-06 PROCEDURE — 3008F BODY MASS INDEX DOCD: CPT | Mod: CPTII,S$GLB,, | Performed by: OTOLARYNGOLOGY

## 2022-12-06 PROCEDURE — 99024 POSTOP FOLLOW-UP VISIT: CPT | Mod: S$GLB,,, | Performed by: OTOLARYNGOLOGY

## 2022-12-06 PROCEDURE — 1125F PR PAIN SEVERITY QUANTIFIED, PAIN PRESENT: ICD-10-PCS | Mod: CPTII,S$GLB,, | Performed by: OTOLARYNGOLOGY

## 2022-12-06 PROCEDURE — 3288F FALL RISK ASSESSMENT DOCD: CPT | Mod: CPTII,S$GLB,, | Performed by: OTOLARYNGOLOGY

## 2022-12-06 PROCEDURE — 99999 PR PBB SHADOW E&M-EST. PATIENT-LVL III: CPT | Mod: PBBFAC,,, | Performed by: OTOLARYNGOLOGY

## 2022-12-06 PROCEDURE — 1160F RVW MEDS BY RX/DR IN RCRD: CPT | Mod: CPTII,S$GLB,, | Performed by: OTOLARYNGOLOGY

## 2022-12-06 PROCEDURE — 1159F PR MEDICATION LIST DOCUMENTED IN MEDICAL RECORD: ICD-10-PCS | Mod: CPTII,S$GLB,, | Performed by: OTOLARYNGOLOGY

## 2022-12-06 PROCEDURE — 1160F PR REVIEW ALL MEDS BY PRESCRIBER/CLIN PHARMACIST DOCUMENTED: ICD-10-PCS | Mod: CPTII,S$GLB,, | Performed by: OTOLARYNGOLOGY

## 2022-12-06 RX ORDER — CALCIPOTRIENE 50 UG/G
1 OINTMENT TOPICAL 2 TIMES DAILY
COMMUNITY
Start: 2022-12-01

## 2022-12-06 RX ORDER — ONDANSETRON HYDROCHLORIDE 4 MG/5ML
4 SOLUTION ORAL EVERY 8 HOURS PRN
COMMUNITY
Start: 2022-12-02

## 2022-12-06 RX ORDER — CLOBETASOL PROPIONATE 0.5 MG/G
CREAM TOPICAL
COMMUNITY
Start: 2022-11-27

## 2022-12-06 NOTE — PROGRESS NOTES
Jennifer Andre Presents 2.5 months status post composite resection of the oral soft tissues and pectoralis flap reconstruction.  Doing well overall.  She is packing a small orocutaneous fistula.      On exam, her neck incision is healed nicely.  Her flap is healthy.  There is a small wound of the left submental soft tissues.  There is no drainage.  It does not communicate with the oral cavity.  This was packed with wet-to-dry gauze.      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  Most recently status post surgical resection and pectoralis flap reconstruction.  She is healing reasonably well.  I expect her fistula will close with wound care.  She will return to see me in 2 weeks.

## 2022-12-14 ENCOUNTER — TELEPHONE (OUTPATIENT)
Dept: OTOLARYNGOLOGY | Facility: CLINIC | Age: 67
End: 2022-12-14
Payer: MEDICARE

## 2023-01-03 ENCOUNTER — TELEPHONE (OUTPATIENT)
Dept: OTOLARYNGOLOGY | Facility: CLINIC | Age: 68
End: 2023-01-03
Payer: MEDICARE

## 2023-01-03 NOTE — TELEPHONE ENCOUNTER
Called pt back. Pt states that the weather is going to get bad today, and she comes in from [place]. Pt would like to reschedule. Advised pt that I would have Dr. Santiago's [place] staff contact pt to reschedule her to see Dr. Paula at [facility], as he wanted to see her in 2 weeks from last visit, and it has been 4 weeks since her last visit. Pt is willing to see Dr. Santiago in [place] for her follow up. Thanks, Kim

## 2023-01-03 NOTE — TELEPHONE ENCOUNTER
----- Message from Ban De Souza sent at 1/3/2023  8:36 AM CST -----  Contact: pt at [phone]  Type: Needs Medical Advice  Who Called:  pt's     Best Call Back Number: [phone]    Additional Information: pt's  is calling the office to R/S appt today due to the weather. Please call back and advise.

## 2023-01-05 ENCOUNTER — OFFICE VISIT (OUTPATIENT)
Dept: SURGERY | Facility: CLINIC | Age: 68
End: 2023-01-05
Payer: MEDICARE

## 2023-01-05 ENCOUNTER — OFFICE VISIT (OUTPATIENT)
Dept: OTOLARYNGOLOGY | Facility: CLINIC | Age: 68
End: 2023-01-05
Payer: MEDICARE

## 2023-01-05 ENCOUNTER — HOSPITAL ENCOUNTER (OUTPATIENT)
Dept: RADIOLOGY | Facility: HOSPITAL | Age: 68
Discharge: HOME | End: 2023-01-05
Attending: OTOLARYNGOLOGY
Payer: MEDICARE

## 2023-01-05 ENCOUNTER — HOSPITAL ENCOUNTER (OUTPATIENT)
Dept: RADIOLOGY | Facility: HOSPITAL | Age: 68
Discharge: HOME | End: 2023-01-05
Attending: SURGERY
Payer: MEDICARE

## 2023-01-05 VITALS
DIASTOLIC BLOOD PRESSURE: 65 MMHG | SYSTOLIC BLOOD PRESSURE: 99 MMHG | WEIGHT: 114 LBS | HEART RATE: 85 BPM | BODY MASS INDEX: 20.19 KG/M2

## 2023-01-05 VITALS
OXYGEN SATURATION: 98 % | SYSTOLIC BLOOD PRESSURE: 110 MMHG | HEART RATE: 80 BPM | DIASTOLIC BLOOD PRESSURE: 57 MMHG | HEIGHT: 63 IN | WEIGHT: 116.63 LBS | BODY MASS INDEX: 20.66 KG/M2

## 2023-01-05 DIAGNOSIS — K94.29 IRRITATION AROUND PERCUTANEOUS ENDOSCOPIC GASTROSTOMY (PEG) TUBE SITE: ICD-10-CM

## 2023-01-05 DIAGNOSIS — C06.9 MALIGNANT NEOPLASM OF ORAL CAVITY: ICD-10-CM

## 2023-01-05 DIAGNOSIS — C06.9 MALIGNANT NEOPLASM OF ORAL CAVITY: Primary | ICD-10-CM

## 2023-01-05 DIAGNOSIS — R13.19 OTHER DYSPHAGIA: ICD-10-CM

## 2023-01-05 DIAGNOSIS — R10.9 ABDOMINAL PAIN: Primary | ICD-10-CM

## 2023-01-05 DIAGNOSIS — R10.9 ABDOMINAL PAIN: ICD-10-CM

## 2023-01-05 LAB
CREAT SERPL-MCNC: 0.5 MG/DL (ref 0.5–1.4)
SAMPLE: NORMAL

## 2023-01-05 PROCEDURE — 70491 CT SOFT TISSUE NECK W/DYE: CPT | Mod: TC

## 2023-01-05 PROCEDURE — 1160F RVW MEDS BY RX/DR IN RCRD: CPT | Mod: CPTII,S$GLB,, | Performed by: OTOLARYNGOLOGY

## 2023-01-05 PROCEDURE — 1125F AMNT PAIN NOTED PAIN PRSNT: CPT | Mod: CPTII,S$GLB,, | Performed by: STUDENT IN AN ORGANIZED HEALTH CARE EDUCATION/TRAINING PROGRAM

## 2023-01-05 PROCEDURE — 74177 CT ABD & PELVIS W/CONTRAST: CPT | Mod: 26,,, | Performed by: RADIOLOGY

## 2023-01-05 PROCEDURE — 99999 PR PBB SHADOW E&M-EST. PATIENT-LVL III: ICD-10-PCS | Mod: PBBFAC,,, | Performed by: STUDENT IN AN ORGANIZED HEALTH CARE EDUCATION/TRAINING PROGRAM

## 2023-01-05 PROCEDURE — 3078F PR MOST RECENT DIASTOLIC BLOOD PRESSURE < 80 MM HG: ICD-10-PCS | Mod: CPTII,S$GLB,, | Performed by: STUDENT IN AN ORGANIZED HEALTH CARE EDUCATION/TRAINING PROGRAM

## 2023-01-05 PROCEDURE — 1125F AMNT PAIN NOTED PAIN PRSNT: CPT | Mod: CPTII,S$GLB,, | Performed by: OTOLARYNGOLOGY

## 2023-01-05 PROCEDURE — 99999 PR PBB SHADOW E&M-EST. PATIENT-LVL III: CPT | Mod: PBBFAC,,, | Performed by: OTOLARYNGOLOGY

## 2023-01-05 PROCEDURE — 3078F PR MOST RECENT DIASTOLIC BLOOD PRESSURE < 80 MM HG: ICD-10-PCS | Mod: CPTII,S$GLB,, | Performed by: OTOLARYNGOLOGY

## 2023-01-05 PROCEDURE — 3078F DIAST BP <80 MM HG: CPT | Mod: CPTII,S$GLB,, | Performed by: OTOLARYNGOLOGY

## 2023-01-05 PROCEDURE — 74177 CT ABD & PELVIS W/CONTRAST: CPT | Mod: TC

## 2023-01-05 PROCEDURE — 99213 OFFICE O/P EST LOW 20 MIN: CPT | Mod: S$GLB,,, | Performed by: OTOLARYNGOLOGY

## 2023-01-05 PROCEDURE — 3288F PR FALLS RISK ASSESSMENT DOCUMENTED: ICD-10-PCS | Mod: CPTII,S$GLB,, | Performed by: STUDENT IN AN ORGANIZED HEALTH CARE EDUCATION/TRAINING PROGRAM

## 2023-01-05 PROCEDURE — 1125F PR PAIN SEVERITY QUANTIFIED, PAIN PRESENT: ICD-10-PCS | Mod: CPTII,S$GLB,, | Performed by: OTOLARYNGOLOGY

## 2023-01-05 PROCEDURE — 99203 PR OFFICE/OUTPT VISIT, NEW, LEVL III, 30-44 MIN: ICD-10-PCS | Mod: S$GLB,,, | Performed by: STUDENT IN AN ORGANIZED HEALTH CARE EDUCATION/TRAINING PROGRAM

## 2023-01-05 PROCEDURE — 1125F PR PAIN SEVERITY QUANTIFIED, PAIN PRESENT: ICD-10-PCS | Mod: CPTII,S$GLB,, | Performed by: STUDENT IN AN ORGANIZED HEALTH CARE EDUCATION/TRAINING PROGRAM

## 2023-01-05 PROCEDURE — 1101F PR PT FALLS ASSESS DOC 0-1 FALLS W/OUT INJ PAST YR: ICD-10-PCS | Mod: CPTII,S$GLB,, | Performed by: STUDENT IN AN ORGANIZED HEALTH CARE EDUCATION/TRAINING PROGRAM

## 2023-01-05 PROCEDURE — 99203 OFFICE O/P NEW LOW 30 MIN: CPT | Mod: S$GLB,,, | Performed by: STUDENT IN AN ORGANIZED HEALTH CARE EDUCATION/TRAINING PROGRAM

## 2023-01-05 PROCEDURE — 74177 CT ABDOMEN PELVIS WITH CONTRAST: ICD-10-PCS | Mod: 26,,, | Performed by: RADIOLOGY

## 2023-01-05 PROCEDURE — 3074F PR MOST RECENT SYSTOLIC BLOOD PRESSURE < 130 MM HG: ICD-10-PCS | Mod: CPTII,S$GLB,, | Performed by: OTOLARYNGOLOGY

## 2023-01-05 PROCEDURE — 3074F SYST BP LT 130 MM HG: CPT | Mod: CPTII,S$GLB,, | Performed by: OTOLARYNGOLOGY

## 2023-01-05 PROCEDURE — 3074F SYST BP LT 130 MM HG: CPT | Mod: CPTII,S$GLB,, | Performed by: STUDENT IN AN ORGANIZED HEALTH CARE EDUCATION/TRAINING PROGRAM

## 2023-01-05 PROCEDURE — 3008F BODY MASS INDEX DOCD: CPT | Mod: CPTII,S$GLB,, | Performed by: OTOLARYNGOLOGY

## 2023-01-05 PROCEDURE — 1159F MED LIST DOCD IN RCRD: CPT | Mod: CPTII,S$GLB,, | Performed by: OTOLARYNGOLOGY

## 2023-01-05 PROCEDURE — 3074F PR MOST RECENT SYSTOLIC BLOOD PRESSURE < 130 MM HG: ICD-10-PCS | Mod: CPTII,S$GLB,, | Performed by: STUDENT IN AN ORGANIZED HEALTH CARE EDUCATION/TRAINING PROGRAM

## 2023-01-05 PROCEDURE — 1160F PR REVIEW ALL MEDS BY PRESCRIBER/CLIN PHARMACIST DOCUMENTED: ICD-10-PCS | Mod: CPTII,S$GLB,, | Performed by: OTOLARYNGOLOGY

## 2023-01-05 PROCEDURE — 3288F FALL RISK ASSESSMENT DOCD: CPT | Mod: CPTII,S$GLB,, | Performed by: STUDENT IN AN ORGANIZED HEALTH CARE EDUCATION/TRAINING PROGRAM

## 2023-01-05 PROCEDURE — 70491 CT SOFT TISSUE NECK WITH CONTRAST: ICD-10-PCS | Mod: 26,,, | Performed by: RADIOLOGY

## 2023-01-05 PROCEDURE — 1159F PR MEDICATION LIST DOCUMENTED IN MEDICAL RECORD: ICD-10-PCS | Mod: CPTII,S$GLB,, | Performed by: OTOLARYNGOLOGY

## 2023-01-05 PROCEDURE — 99999 PR PBB SHADOW E&M-EST. PATIENT-LVL III: ICD-10-PCS | Mod: PBBFAC,,, | Performed by: OTOLARYNGOLOGY

## 2023-01-05 PROCEDURE — 3008F PR BODY MASS INDEX (BMI) DOCUMENTED: ICD-10-PCS | Mod: CPTII,S$GLB,, | Performed by: OTOLARYNGOLOGY

## 2023-01-05 PROCEDURE — 3078F DIAST BP <80 MM HG: CPT | Mod: CPTII,S$GLB,, | Performed by: STUDENT IN AN ORGANIZED HEALTH CARE EDUCATION/TRAINING PROGRAM

## 2023-01-05 PROCEDURE — 3008F PR BODY MASS INDEX (BMI) DOCUMENTED: ICD-10-PCS | Mod: CPTII,S$GLB,, | Performed by: STUDENT IN AN ORGANIZED HEALTH CARE EDUCATION/TRAINING PROGRAM

## 2023-01-05 PROCEDURE — 1101F PT FALLS ASSESS-DOCD LE1/YR: CPT | Mod: CPTII,S$GLB,, | Performed by: STUDENT IN AN ORGANIZED HEALTH CARE EDUCATION/TRAINING PROGRAM

## 2023-01-05 PROCEDURE — 99999 PR PBB SHADOW E&M-EST. PATIENT-LVL III: CPT | Mod: PBBFAC,,, | Performed by: STUDENT IN AN ORGANIZED HEALTH CARE EDUCATION/TRAINING PROGRAM

## 2023-01-05 PROCEDURE — 70491 CT SOFT TISSUE NECK W/DYE: CPT | Mod: 26,,, | Performed by: RADIOLOGY

## 2023-01-05 PROCEDURE — 25500020 PHARM REV CODE 255: Performed by: SURGERY

## 2023-01-05 PROCEDURE — 3008F BODY MASS INDEX DOCD: CPT | Mod: CPTII,S$GLB,, | Performed by: STUDENT IN AN ORGANIZED HEALTH CARE EDUCATION/TRAINING PROGRAM

## 2023-01-05 PROCEDURE — 99213 PR OFFICE/OUTPT VISIT, EST, LEVL III, 20-29 MIN: ICD-10-PCS | Mod: S$GLB,,, | Performed by: OTOLARYNGOLOGY

## 2023-01-05 RX ORDER — HYDROCODONE BITARTRATE AND ACETAMINOPHEN 7.5; 325 MG/15ML; MG/15ML
15 SOLUTION ORAL 4 TIMES DAILY PRN
Qty: 450 ML | Refills: 0 | Status: SHIPPED | OUTPATIENT
Start: 2023-01-05 | End: 2023-03-30 | Stop reason: SDUPTHER

## 2023-01-05 RX ADMIN — DIATRIZOATE MEGLUMINE AND DIATRIZOATE SODIUM 30 ML: 660; 100 LIQUID ORAL; RECTAL at 05:01

## 2023-01-05 RX ADMIN — IOHEXOL 100 ML: 350 INJECTION, SOLUTION INTRAVENOUS at 05:01

## 2023-01-05 NOTE — PROGRESS NOTES
"General Surgery Office Visit   History and Physical    Patient Name: Jennifer Andre  YOB: 1955 (67 y.o.)  MRN: [id number]  Today's Date: 01/09/2023    Referring Md:   No referring provider defined for this encounter.    SUBJECTIVE:     Chief Complaint: Abdominal pain    History of Present Illness:  Jennifer Andre is a 67 y.o. female with PMHx of malignant neoplasm of oral cavity (s/p resection and PEG placement 2019, recurrence and replacement PEG August 2022), GERD, malnutrition who presents to the clinic today from her ENT office complaining of abdominal pain which she first noticed in November 2022, worse in the last 2 weeks. Patient describes the pain as sharp and constant. Waxes and wanes in severity, 10/10 at its worse. No known aggravating or alleviating factors. Pain is worse at PEG tube site. She is currently compeltely dependent on PEG for nutrition (tube feeds) and meds). Pain is not worse with using tube. She has not had any issues with using tube. Denies drainage or redness around tube site. Reports having similar pain with her previous PEG (2019) and had to have "a big part of my stomach removed" because the bumped was buried. This was done in [place], [place] where she current lives. She denies fever, chills, unintentional weight loss, n/v/d, constipation, hematochezia, dysuria, hematuria, CP, SOB, and all other symptoms. Patient reports being compliant with home medication regimen. Here today with her daughter    She has a CT neck this PM scheduled with ENT.      Review of patient's allergies indicates:   Allergen Reactions    Bactrim [sulfamethoxazole-trimethoprim]     Clindamycin     Keflex [cephalexin]      Tolerated Unasyn 05/31/2019    Tramadol      DIZZY AND NAUSEA, & VOMITTING       Past Medical History:   Diagnosis Date    Cancer      Past Surgical History:   Procedure Laterality Date    APPENDECTOMY      CHOLECYSTECTOMY      DISSECTION OF NECK Right 5/30/2019    Procedure: " DISSECTION, NECK;  Surgeon: Wallace Santiago MD;  Location: [address];  Service: ENT;  Laterality: Right;    ESOPHAGOGASTRODUODENOSCOPY W/ PEG  5/30/2019    Procedure: EGD, WITH PEG TUBE INSERTION;  Surgeon: Ottoniel Jimenez MD;  Location: [address];  Service: General;;    FLAP PROCEDURE Right 5/30/2019    Procedure: CREATION, FREE FLAP;  Surgeon: Navdeep Dangelo MD;  Location: [address];  Service: Plastics;  Laterality: Right;  ischemia time 1234-1238  flap rt lower leg to rt neck    FLAP PROCEDURE Right 9/28/2022    Procedure: CREATION, FREE FLAP;  Surgeon: Milly Price MD;  Location: [facility] OR [address] [place] [address];  Service: ENT;  Laterality: Right;    HYSTERECTOMY      MANDIBLE SURGERY      SURGICAL REMOVAL OF NEOPLASM OF MOUTH Right 9/28/2022    Procedure: EXCISION, NEOPLASM, MOUTH;  Surgeon: Wallace Santiago MD;  Location: [facility] OR [address] [place] [address];  Service: ENT;  Laterality: Right;    TRACHEOTOMY N/A 5/30/2019    Procedure: TRACHEOTOMY;  Surgeon: Navdeep Dangelo MD;  Location: [address];  Service: Plastics;  Laterality: N/A;    TRACHEOTOMY N/A 9/28/2022    Procedure: TRACHEOTOMY;  Surgeon: Wallace Santiago MD;  Location: [address];  Service: ENT;  Laterality: N/A;     No family history on file.  Social History     Tobacco Use    Smoking status: Former    Smokeless tobacco: Never   Substance Use Topics    Alcohol use: Never    Drug use: Never        Review of Systems:  Review of Systems   Constitutional:  Negative for chills and fever.   Respiratory:  Negative for cough and shortness of breath.    Cardiovascular:  Negative for chest pain.   Gastrointestinal:  Positive for abdominal pain. Negative for constipation, diarrhea, nausea and vomiting.   Musculoskeletal:  Negative for falls.   Skin:  Negative for rash.   Neurological:  Negative for dizziness and headaches.   Psychiatric/Behavioral:  Negative for substance abuse. The patient is not nervous/anxious.    All other systems reviewed and  "are negative.    OBJECTIVE:     Vital Signs (Most Recent)  BP (!) 110/57 (BP Location: Left arm, Patient Position: Sitting)   Pulse 80   Ht 5' 3" (1.6 m)   Wt 52.9 kg (116 lb 10 oz)   SpO2 98%   BMI 20.66 kg/m²     Physical Exam  Vitals and nursing note reviewed.   Constitutional:       General: She is not in acute distress.     Appearance: She is not diaphoretic.      Comments: Room air  Thin    HENT:      Head: Normocephalic and atraumatic.      Mouth/Throat:      Mouth: Mucous membranes are moist.      Pharynx: Oropharynx is clear.   Eyes:      Extraocular Movements: Extraocular movements intact.      Conjunctiva/sclera: Conjunctivae normal.   Cardiovascular:      Rate and Rhythm: Normal rate.   Pulmonary:      Effort: Pulmonary effort is normal. No respiratory distress.   Abdominal:      General: There is no distension.      Palpations: Abdomen is soft.      Tenderness: There is abdominal tenderness. There is no guarding or rebound.      Comments: PEG to L abdomen. Insertion site is clean and dry without surrounding erythema. No drainage or increased warmth. TTP around tube and in epigastrium. Not TTP in remainder of abdomen   Musculoskeletal:         General: No deformity.      Cervical back: Normal range of motion.   Skin:     General: Skin is warm and dry.   Neurological:      Mental Status: She is alert and oriented to person, place, and time.           ASSESSMENT/PLAN:     Jennifer Andre is a 67 y.o. female with PMHx of malignant neoplasm of oral cavity (s/p resection and PEG placement 2019, recurrence and replacement PEG August 2022), GERD, malnutrition who presents to the clinic today from her ENT office complaining of abdominal pain. Clinically stable without signs of infection     Jennifer was seen today for follow-up.    Diagnoses and all orders for this visit:    Abdominal pain  -     CT Abdomen Pelvis With Contrast; Future    Irritation around percutaneous endoscopic gastrostomy (PEG) tube " site      - Will add on CT a/p to CT neck for this Pmfor further eval  - ED precautions given  - RTC PRN  - Continue any current medications  - Call with any questions or concerns  - Discussed POC with patient. All questions were answered. Patient is agreeable to plan and verbalized understanding.     Case discussed with Dr. Barbosa. Patient seen and examined by Dr. Barbosa.      MICHAEL Ho, PA-C  General Surgery  - [facility]

## 2023-01-06 ENCOUNTER — TELEPHONE (OUTPATIENT)
Dept: SURGERY | Facility: CLINIC | Age: 68
End: 2023-01-06
Payer: MEDICARE

## 2023-01-06 RX ORDER — CIPROFLOXACIN 500 MG/1
500 TABLET ORAL EVERY 12 HOURS
COMMUNITY

## 2023-01-06 RX ORDER — METRONIDAZOLE 500 MG/1
500 TABLET ORAL 3 TIMES DAILY
COMMUNITY

## 2023-01-06 NOTE — TELEPHONE ENCOUNTER
Spoke with Pharmacist at [facility] in [place] regarding changing Abx tablets to suspensions.  Katlin, PharmD at [facility] will fill the Cipro and Flagyl as tablets and will instruct patient on how to take the medication with her PEG.  She verbalized understanding.

## 2023-01-06 NOTE — TELEPHONE ENCOUNTER
Notified patient after AHeptingGURDEEP's review of CT Scan from yesterday:  Diverticulitis noted.  There are no issues with the PEG tube.  Rx for Cipro and Flagyl called to her pharmacy in [place].  She will need to f/u with either GI or her PCP in [place] within a week for management of the diverticulitis.  Spoke with patient and her  regarding the need for f/u with her PCP and GI urgently.  They verbalized understanding and is agreeable to this plan of care.

## 2023-01-06 NOTE — TELEPHONE ENCOUNTER
----- Message from Tremontana Chevalier sent at 1/6/2023  1:59 PM CST -----  Regarding: RX - today  Pt calling to spk with someone in Dr. Cuadra's office to advise that she cannot take medication tablets, only liquids. RXs metroNIDAZOLE (FLAGYL) 500 MG tablet and ciprofloxacin HCl (CIPRO) 500 MG tablet. Pls call pt @ [phone].    Pharm  [id number] - [place], [place] - [address] [facility] [address]   Phone: [phone]  Fax:  [phone]

## 2023-01-08 NOTE — PROGRESS NOTES
Chief Complaint   Patient presents with    Post-op Evaluation     Oncology History   Malignant neoplasm of oral cavity   2013 Surgery    L Segmental mandibulectomy, chemo/RT - [facility] - 2013 2013 Initial Diagnosis    Malignant neoplasm of oral cavity     2015 Surgery     L mandible ORN, osteocutaneous RFFF - [facility] - ~2015 4/2018 Biopsy    Biopsy R RMT - [place] - 04/2018 5/30/2019 Surgery    1.  Composite resection of the right mandible, buccal mucosa and floor of mouth  2.  Right modified neck dissection including levels 1 B through 4  3.  Right fibula free flap with inset into the mouth.  4.  Mandibular reconstruction with transosteal plate.  5.  Right condylar reconstruction.  6.  Site preparation for flap inset.  7.  Tracheostomy.  8.  Neck exploration for vessels with anastomosis into the right facial artery and right common facial vein.  9.  Split-thickness skin graft from the leg measuring 15 x 6 cm.  10.  AlloDerm with inset into the right glenoid fossa.     7/9/2019 Cancer Staged    Staging form: Oral Cavity, AJCC 8th Edition  - Pathologic: Stage I (pT1, pN0, cM0) - Signed by Isabella Perea NP on 7/9/2019 9/28/2022 Surgery    1. Revision tracheostomy   2. Right partial glossectomy with resection of floor of mouth/buccal mucosa/mandibular gingiva/tongue base and right soft palate  3. Right pectoralis major myocutaneous regional flap for coverage of oral cavity defect 11x 6 cm  4. Complex vestibuloplasty   5. Pharyngoplasty   6.  Advancement flap for closure of secondary chest defect with advanced area being 20 x 22 cm    7. Tracheostomy tube exchange     Squamous cell carcinoma   5/30/2019 Initial Diagnosis    Squamous cell carcinoma           HPI   67 y.o. female presents with the above treatment history.  Overall, she is doing quite well.  Her fistula has closed.  She reports some pain at her gastrostomy site.    Review of Systems   Constitutional: Negative for fatigue and unexpected  weight change.   HENT: Per HPI.  Eyes: Negative for visual disturbance.   Respiratory: Negative for shortness of breath, hemoptysis   Cardiovascular: Negative for chest pain and palpitations.   Musculoskeletal: Negative for decreased ROM, back pain.   Skin: Negative for rash, sunburn, itching.   Neurological: Negative for dizziness and seizures.   Hematological: Negative for adenopathy. Does not bruise/bleed easily.   Endocrine: Negative for rapid weight loss/weight gain, heat/cold intolerance.     Past Medical History   Patient Active Problem List   Diagnosis    Malignant neoplasm of oral cavity    Squamous cell carcinoma    Moderate malnutrition    Gastroesophageal reflux disease    Decreased mobility    Open wound of mouth    Open wound of right chest wall    Open wound of throat           Past Surgical History   Past Surgical History:   Procedure Laterality Date    APPENDECTOMY      CHOLECYSTECTOMY      DISSECTION OF NECK Right 5/30/2019    Procedure: DISSECTION, NECK;  Surgeon: Wallace Santiago MD;  Location: [address];  Service: ENT;  Laterality: Right;    ESOPHAGOGASTRODUODENOSCOPY W/ PEG  5/30/2019    Procedure: EGD, WITH PEG TUBE INSERTION;  Surgeon: Ottoniel Jimenez MD;  Location: [address];  Service: General;;    FLAP PROCEDURE Right 5/30/2019    Procedure: CREATION, FREE FLAP;  Surgeon: Navdeep Dangelo MD;  Location: [address];  Service: Plastics;  Laterality: Right;  ischemia time 7375-0424  flap rt lower leg to rt neck    FLAP PROCEDURE Right 9/28/2022    Procedure: CREATION, FREE FLAP;  Surgeon: Milly Price MD;  Location: [address];  Service: ENT;  Laterality: Right;    HYSTERECTOMY      MANDIBLE SURGERY      SURGICAL REMOVAL OF NEOPLASM OF MOUTH Right 9/28/2022    Procedure: EXCISION, NEOPLASM, MOUTH;  Surgeon: Wallace Santiago MD;  Location: [address];  Service: ENT;  Laterality: Right;    TRACHEOTOMY N/A 5/30/2019    Procedure: TRACHEOTOMY;  Surgeon: Navdeep BARNETT  MD [place];  Location: [address];  Service: Plastics;  Laterality: N/A;    TRACHEOTOMY N/A 9/28/2022    Procedure: TRACHEOTOMY;  Surgeon: Wallace Santiago MD;  Location: [address];  Service: ENT;  Laterality: N/A;         Family History   No family history on file.        Social History   .  Social History     Socioeconomic History    Marital status:    Tobacco Use    Smoking status: Former    Smokeless tobacco: Never   Substance and Sexual Activity    Alcohol use: Never    Drug use: Never     Social Determinants of Health     Financial Resource Strain: High Risk    Difficulty of Paying Living Expenses: Hard   Food Insecurity: Food Insecurity Present    Worried About Running Out of Food in the Last Year: Sometimes true    Ran Out of Food in the Last Year: Sometimes true   Transportation Needs: No Transportation Needs    Lack of Transportation (Medical): No    Lack of Transportation (Non-Medical): No   Physical Activity: Inactive    Days of Exercise per Week: 0 days    Minutes of Exercise per Session: 0 min   Stress: No Stress Concern Present    Feeling of Stress : Only a little   Social Connections: Moderately Isolated    Frequency of Communication with Friends and Family: More than three times a week    Frequency of Social Gatherings with Friends and Family: More than three times a week    Attends Hoahaoism Services: Never    Active Member of Clubs or Organizations: No    Attends Club or Organization Meetings: Never    Marital Status:    Housing Stability: High Risk    Unable to Pay for Housing in the Last Year: Yes    Number of Places Lived in the Last Year: 1    Unstable Housing in the Last Year: No         Allergies   Review of patient's allergies indicates:   Allergen Reactions    Bactrim [sulfamethoxazole-trimethoprim]     Clindamycin     Keflex [cephalexin]      Tolerated Unasyn 05/31/2019    Tramadol      DIZZY AND NAUSEA, & VOMITTING           Physical Exam     Vitals:     01/05/23 1324   BP: 99/65   Pulse: 85         Body mass index is 20.19 kg/m².      General: AOx3, NAD   Respiratory:  Symmetric chest rise, normal effort  Oral Cavity:  Oral Tongue within the range of motion.  No worrisome lesions.  Multiple flaps incorporated into oral cavity.  Her left submental orocutaneous fistula has closed.. Hard Palate WNL. No buccal or FOM lesions.  Oropharynx:  No masses/lesions of the posterior pharyngeal wall. Tonsillar fossa without lesions. Soft palate without masses. Midline uvula.   Neck:  Multiple scars.  Marked fibrosis status post surgery and radiation..  No cervical lymphadenopathy, thyromegaly or thyroid nodules.  Normal range of motion.    Face: House Brackmann I bilaterally.     Assessment/Plan  Problem List Items Addressed This Visit          Oncology    Malignant neoplasm of oral cavity - Primary     Her fistula has closed.  Will order a modified barium swallow to assess the safety of her swallowing mechanism.  CT neck ordered for post surgery baseline.  Will arrange for her to see surgery regarding her PEG.  Return in 6 weeks.         Relevant Orders    CT Soft Tissue Neck With Contrast (Completed)    Fl Modified Barium Swallow Speech    SLP video swallow    Creatinine, serum    Ambulatory referral/consult to General Surgery     Other Visit Diagnoses       Other dysphagia        Relevant Orders    Fl Modified Barium Swallow Speech

## 2023-01-08 NOTE — ASSESSMENT & PLAN NOTE
Her fistula has closed.  Will order a modified barium swallow to assess the safety of her swallowing mechanism.  CT neck ordered for post surgery baseline.  Will arrange for her to see surgery regarding her PEG.  Return in 6 weeks.

## 2023-01-12 ENCOUNTER — TELEPHONE (OUTPATIENT)
Dept: SPEECH THERAPY | Facility: HOSPITAL | Age: 68
End: 2023-01-12
Payer: MEDICARE

## 2023-01-12 NOTE — TELEPHONE ENCOUNTER
Stephanie pt  to schedule mbss 2/16@2pm. They wanted to wait until coming back to see dr. Santiago. Informed his staff pt may run a few minutes late.  [facility] radiology main clinic.

## 2023-02-16 ENCOUNTER — TELEPHONE (OUTPATIENT)
Dept: SPEECH THERAPY | Facility: HOSPITAL | Age: 68
End: 2023-02-16
Payer: MEDICARE

## 2023-02-16 ENCOUNTER — OFFICE VISIT (OUTPATIENT)
Dept: OTOLARYNGOLOGY | Facility: CLINIC | Age: 68
End: 2023-02-16
Payer: MEDICARE

## 2023-02-16 VITALS
DIASTOLIC BLOOD PRESSURE: 63 MMHG | BODY MASS INDEX: 19.49 KG/M2 | WEIGHT: 110 LBS | SYSTOLIC BLOOD PRESSURE: 114 MMHG | HEART RATE: 68 BPM | HEIGHT: 63 IN

## 2023-02-16 DIAGNOSIS — R13.12 DYSPHAGIA, OROPHARYNGEAL PHASE: ICD-10-CM

## 2023-02-16 DIAGNOSIS — C06.9 MALIGNANT NEOPLASM OF ORAL CAVITY: Primary | ICD-10-CM

## 2023-02-16 PROCEDURE — 99999 PR PBB SHADOW E&M-EST. PATIENT-LVL III: ICD-10-PCS | Mod: PBBFAC,,, | Performed by: OTOLARYNGOLOGY

## 2023-02-16 PROCEDURE — 3074F PR MOST RECENT SYSTOLIC BLOOD PRESSURE < 130 MM HG: ICD-10-PCS | Mod: CPTII,S$GLB,, | Performed by: OTOLARYNGOLOGY

## 2023-02-16 PROCEDURE — 1160F RVW MEDS BY RX/DR IN RCRD: CPT | Mod: CPTII,S$GLB,, | Performed by: OTOLARYNGOLOGY

## 2023-02-16 PROCEDURE — 3078F DIAST BP <80 MM HG: CPT | Mod: CPTII,S$GLB,, | Performed by: OTOLARYNGOLOGY

## 2023-02-16 PROCEDURE — 99999 PR PBB SHADOW E&M-EST. PATIENT-LVL III: CPT | Mod: PBBFAC,,, | Performed by: OTOLARYNGOLOGY

## 2023-02-16 PROCEDURE — 1125F AMNT PAIN NOTED PAIN PRSNT: CPT | Mod: CPTII,S$GLB,, | Performed by: OTOLARYNGOLOGY

## 2023-02-16 PROCEDURE — 3078F PR MOST RECENT DIASTOLIC BLOOD PRESSURE < 80 MM HG: ICD-10-PCS | Mod: CPTII,S$GLB,, | Performed by: OTOLARYNGOLOGY

## 2023-02-16 PROCEDURE — 99213 OFFICE O/P EST LOW 20 MIN: CPT | Mod: S$GLB,,, | Performed by: OTOLARYNGOLOGY

## 2023-02-16 PROCEDURE — 3074F SYST BP LT 130 MM HG: CPT | Mod: CPTII,S$GLB,, | Performed by: OTOLARYNGOLOGY

## 2023-02-16 PROCEDURE — 99213 PR OFFICE/OUTPT VISIT, EST, LEVL III, 20-29 MIN: ICD-10-PCS | Mod: S$GLB,,, | Performed by: OTOLARYNGOLOGY

## 2023-02-16 PROCEDURE — 1125F PR PAIN SEVERITY QUANTIFIED, PAIN PRESENT: ICD-10-PCS | Mod: CPTII,S$GLB,, | Performed by: OTOLARYNGOLOGY

## 2023-02-16 PROCEDURE — 3008F BODY MASS INDEX DOCD: CPT | Mod: CPTII,S$GLB,, | Performed by: OTOLARYNGOLOGY

## 2023-02-16 PROCEDURE — 1159F MED LIST DOCD IN RCRD: CPT | Mod: CPTII,S$GLB,, | Performed by: OTOLARYNGOLOGY

## 2023-02-16 PROCEDURE — 1159F PR MEDICATION LIST DOCUMENTED IN MEDICAL RECORD: ICD-10-PCS | Mod: CPTII,S$GLB,, | Performed by: OTOLARYNGOLOGY

## 2023-02-16 PROCEDURE — 1160F PR REVIEW ALL MEDS BY PRESCRIBER/CLIN PHARMACIST DOCUMENTED: ICD-10-PCS | Mod: CPTII,S$GLB,, | Performed by: OTOLARYNGOLOGY

## 2023-02-16 PROCEDURE — 3008F PR BODY MASS INDEX (BMI) DOCUMENTED: ICD-10-PCS | Mod: CPTII,S$GLB,, | Performed by: OTOLARYNGOLOGY

## 2023-02-16 NOTE — TELEPHONE ENCOUNTER
Sw patient and  regarding today's mbss not yet authorized and pt would be coming from [place] as she has a 2:30 appt with Dr. Santiago today. MBSS was canceled, SLP performing mbss today suggested in office FEES, when returning. I sw pt and  making them aware of situation he said she did still want to see Dr. Santiago for 6wk f/u today and would like to get the FEES rescheduled for another day when she returns for future to Dr. Santiago office. I advised him to call me when the f/u was made for Pmaela.     Dr. Santiago please place FEES order.      Ms. Mariela,the pt  has asked in the mean time which exercises can she work on for swallowing.

## 2023-02-18 NOTE — PROGRESS NOTES
No chief complaint on file.    Oncology History   Malignant neoplasm of oral cavity   2013 Surgery    L Segmental mandibulectomy, chemo/RT - [facility] - 2013 2013 Initial Diagnosis    Malignant neoplasm of oral cavity     2015 Surgery     L mandible ORN, osteocutaneous RFFF - [facility] - ~2015       4/2018 Biopsy    Biopsy R RMT - [place] - 04/2018 5/30/2019 Surgery    1.  Composite resection of the right mandible, buccal mucosa and floor of mouth  2.  Right modified neck dissection including levels 1 B through 4  3.  Right fibula free flap with inset into the mouth.  4.  Mandibular reconstruction with transosteal plate.  5.  Right condylar reconstruction.  6.  Site preparation for flap inset.  7.  Tracheostomy.  8.  Neck exploration for vessels with anastomosis into the right facial artery and right common facial vein.  9.  Split-thickness skin graft from the leg measuring 15 x 6 cm.  10.  AlloDerm with inset into the right glenoid fossa.     7/9/2019 Cancer Staged    Staging form: Oral Cavity, AJCC 8th Edition  - Pathologic: Stage I (pT1, pN0, cM0) - Signed by Isabella Perea NP on 7/9/2019 9/28/2022 Surgery    1. Revision tracheostomy   2. Right partial glossectomy with resection of floor of mouth/buccal mucosa/mandibular gingiva/tongue base and right soft palate  3. Right pectoralis major myocutaneous regional flap for coverage of oral cavity defect 11x 6 cm  4. Complex vestibuloplasty   5. Pharyngoplasty   6.  Advancement flap for closure of secondary chest defect with advanced area being 20 x 22 cm    7. Tracheostomy tube exchange     Squamous cell carcinoma   5/30/2019 Initial Diagnosis    Squamous cell carcinoma           HPI   67 y.o. female presents with the above treatment history.  Overall, she is doing quite well.  She was recently treated for an infection at her PEG site.  She is interested in advancing her diet and debulking of her pec flap.    Review of Systems   Constitutional: Negative for  fatigue and unexpected weight change.   HENT: Per HPI.  Eyes: Negative for visual disturbance.   Respiratory: Negative for shortness of breath, hemoptysis   Cardiovascular: Negative for chest pain and palpitations.   Musculoskeletal: Negative for decreased ROM, back pain.   Skin: Negative for rash, sunburn, itching.   Neurological: Negative for dizziness and seizures.   Hematological: Negative for adenopathy. Does not bruise/bleed easily.   Endocrine: Negative for rapid weight loss/weight gain, heat/cold intolerance.     Past Medical History   Patient Active Problem List   Diagnosis    Malignant neoplasm of oral cavity    Squamous cell carcinoma    Moderate malnutrition    Gastroesophageal reflux disease    Decreased mobility    Open wound of mouth    Open wound of right chest wall    Open wound of throat           Past Surgical History   Past Surgical History:   Procedure Laterality Date    APPENDECTOMY      CHOLECYSTECTOMY      DISSECTION OF NECK Right 5/30/2019    Procedure: DISSECTION, NECK;  Surgeon: Wallace Santiago MD;  Location: [address];  Service: ENT;  Laterality: Right;    ESOPHAGOGASTRODUODENOSCOPY W/ PEG  5/30/2019    Procedure: EGD, WITH PEG TUBE INSERTION;  Surgeon: Ottoniel Jimenez MD;  Location: [address];  Service: General;;    FLAP PROCEDURE Right 5/30/2019    Procedure: CREATION, FREE FLAP;  Surgeon: Navdeep Dangelo MD;  Location: [address];  Service: Plastics;  Laterality: Right;  ischemia time 6591-3645  flap rt lower leg to rt neck    FLAP PROCEDURE Right 9/28/2022    Procedure: CREATION, FREE FLAP;  Surgeon: Milly Price MD;  Location: [address];  Service: ENT;  Laterality: Right;    HYSTERECTOMY      MANDIBLE SURGERY      SURGICAL REMOVAL OF NEOPLASM OF MOUTH Right 9/28/2022    Procedure: EXCISION, NEOPLASM, MOUTH;  Surgeon: Wallace Santiago MD;  Location: [address];  Service: ENT;  Laterality: Right;    TRACHEOTOMY N/A 5/30/2019    Procedure:  TRACHEOTOMY;  Surgeon: Navdeep Dangelo MD;  Location: [address];  Service: Plastics;  Laterality: N/A;    TRACHEOTOMY N/A 9/28/2022    Procedure: TRACHEOTOMY;  Surgeon: Wallace Santiago MD;  Location: [facility] OR [address] [place] [address];  Service: ENT;  Laterality: N/A;         Family History   History reviewed. No pertinent family history.        Social History   .  Social History     Socioeconomic History    Marital status:    Tobacco Use    Smoking status: Former    Smokeless tobacco: Never   Substance and Sexual Activity    Alcohol use: Never    Drug use: Never     Social Determinants of Health     Financial Resource Strain: High Risk    Difficulty of Paying Living Expenses: Hard   Food Insecurity: Food Insecurity Present    Worried About Running Out of Food in the Last Year: Sometimes true    Ran Out of Food in the Last Year: Sometimes true   Transportation Needs: No Transportation Needs    Lack of Transportation (Medical): No    Lack of Transportation (Non-Medical): No   Physical Activity: Inactive    Days of Exercise per Week: 0 days    Minutes of Exercise per Session: 0 min   Stress: No Stress Concern Present    Feeling of Stress : Only a little   Social Connections: Moderately Isolated    Frequency of Communication with Friends and Family: More than three times a week    Frequency of Social Gatherings with Friends and Family: More than three times a week    Attends Mandaen Services: Never    Active Member of Clubs or Organizations: No    Attends Club or Organization Meetings: Never    Marital Status:    Housing Stability: High Risk    Unable to Pay for Housing in the Last Year: Yes    Number of Places Lived in the Last Year: 1    Unstable Housing in the Last Year: No         Allergies   Review of patient's allergies indicates:   Allergen Reactions    Bactrim [sulfamethoxazole-trimethoprim]     Clindamycin     Keflex [cephalexin]      Tolerated Unasyn 05/31/2019    Tramadol      DIZZY AND NAUSEA, &  VOMITTING           Physical Exam     Vitals:    02/16/23 1411   BP: 114/63   Pulse: 68         Body mass index is 19.49 kg/m².      General: AOx3, NAD   Respiratory:  Symmetric chest rise, normal effort  Oral Cavity:  Oral Tongue within the range of motion.  No worrisome lesions.  Multiple flaps incorporated into oral cavity.  Her left submental orocutaneous fistula has closed.. Hard Palate WNL. No buccal or FOM lesions.  Oropharynx:  No masses/lesions of the posterior pharyngeal wall. Tonsillar fossa without lesions. Soft palate without masses. Midline uvula.   Neck:  Multiple scars.  Marked fibrosis status post surgery and radiation..  No cervical lymphadenopathy, thyromegaly or thyroid nodules.  Normal range of motion.    Face: House Brackmann I bilaterally.     Assessment/Plan  Problem List Items Addressed This Visit          Oncology    Malignant neoplasm of oral cavity - Primary     ANIYA.  MBSS ordered. Will coordinate with next visit in approximately 6 weeks.           Relevant Orders    Fl Modified Barium Swallow Speech    SLP video swallow     Other Visit Diagnoses       Dysphagia, oropharyngeal phase        Relevant Orders    Fl Modified Barium Swallow Speech

## 2023-03-30 ENCOUNTER — OFFICE VISIT (OUTPATIENT)
Dept: OTOLARYNGOLOGY | Facility: CLINIC | Age: 68
End: 2023-03-30
Payer: MEDICARE

## 2023-03-30 ENCOUNTER — CLINICAL SUPPORT (OUTPATIENT)
Dept: SPEECH THERAPY | Facility: HOSPITAL | Age: 68
End: 2023-03-30
Attending: OTOLARYNGOLOGY
Payer: MEDICARE

## 2023-03-30 ENCOUNTER — TELEPHONE (OUTPATIENT)
Dept: SPEECH THERAPY | Facility: HOSPITAL | Age: 68
End: 2023-03-30
Payer: MEDICARE

## 2023-03-30 VITALS
HEART RATE: 64 BPM | BODY MASS INDEX: 18.82 KG/M2 | WEIGHT: 106.25 LBS | DIASTOLIC BLOOD PRESSURE: 63 MMHG | SYSTOLIC BLOOD PRESSURE: 93 MMHG | HEIGHT: 63 IN

## 2023-03-30 DIAGNOSIS — R13.12 DYSPHAGIA, OROPHARYNGEAL: ICD-10-CM

## 2023-03-30 DIAGNOSIS — C06.9 MALIGNANT NEOPLASM OF ORAL CAVITY: ICD-10-CM

## 2023-03-30 DIAGNOSIS — E44.0 MODERATE MALNUTRITION: ICD-10-CM

## 2023-03-30 DIAGNOSIS — C06.9 MALIGNANT NEOPLASM OF ORAL CAVITY: Primary | ICD-10-CM

## 2023-03-30 DIAGNOSIS — R13.12 DYSPHAGIA, OROPHARYNGEAL: Primary | ICD-10-CM

## 2023-03-30 PROCEDURE — 99999 PR PBB SHADOW E&M-EST. PATIENT-LVL III: ICD-10-PCS | Mod: PBBFAC,,, | Performed by: OTOLARYNGOLOGY

## 2023-03-30 PROCEDURE — 1159F MED LIST DOCD IN RCRD: CPT | Mod: CPTII,S$GLB,, | Performed by: OTOLARYNGOLOGY

## 2023-03-30 PROCEDURE — 3074F SYST BP LT 130 MM HG: CPT | Mod: CPTII,S$GLB,, | Performed by: OTOLARYNGOLOGY

## 2023-03-30 PROCEDURE — 1160F PR REVIEW ALL MEDS BY PRESCRIBER/CLIN PHARMACIST DOCUMENTED: ICD-10-PCS | Mod: CPTII,S$GLB,, | Performed by: OTOLARYNGOLOGY

## 2023-03-30 PROCEDURE — 3008F BODY MASS INDEX DOCD: CPT | Mod: CPTII,S$GLB,, | Performed by: OTOLARYNGOLOGY

## 2023-03-30 PROCEDURE — 3078F PR MOST RECENT DIASTOLIC BLOOD PRESSURE < 80 MM HG: ICD-10-PCS | Mod: CPTII,S$GLB,, | Performed by: OTOLARYNGOLOGY

## 2023-03-30 PROCEDURE — 1125F AMNT PAIN NOTED PAIN PRSNT: CPT | Mod: CPTII,S$GLB,, | Performed by: OTOLARYNGOLOGY

## 2023-03-30 PROCEDURE — 1159F PR MEDICATION LIST DOCUMENTED IN MEDICAL RECORD: ICD-10-PCS | Mod: CPTII,S$GLB,, | Performed by: OTOLARYNGOLOGY

## 2023-03-30 PROCEDURE — 3074F PR MOST RECENT SYSTOLIC BLOOD PRESSURE < 130 MM HG: ICD-10-PCS | Mod: CPTII,S$GLB,, | Performed by: OTOLARYNGOLOGY

## 2023-03-30 PROCEDURE — 1125F PR PAIN SEVERITY QUANTIFIED, PAIN PRESENT: ICD-10-PCS | Mod: CPTII,S$GLB,, | Performed by: OTOLARYNGOLOGY

## 2023-03-30 PROCEDURE — 3078F DIAST BP <80 MM HG: CPT | Mod: CPTII,S$GLB,, | Performed by: OTOLARYNGOLOGY

## 2023-03-30 PROCEDURE — 1160F RVW MEDS BY RX/DR IN RCRD: CPT | Mod: CPTII,S$GLB,, | Performed by: OTOLARYNGOLOGY

## 2023-03-30 PROCEDURE — 99999 PR PBB SHADOW E&M-EST. PATIENT-LVL III: CPT | Mod: PBBFAC,,, | Performed by: OTOLARYNGOLOGY

## 2023-03-30 PROCEDURE — 99213 PR OFFICE/OUTPT VISIT, EST, LEVL III, 20-29 MIN: ICD-10-PCS | Mod: S$GLB,,, | Performed by: OTOLARYNGOLOGY

## 2023-03-30 PROCEDURE — 99213 OFFICE O/P EST LOW 20 MIN: CPT | Mod: S$GLB,,, | Performed by: OTOLARYNGOLOGY

## 2023-03-30 PROCEDURE — 3008F PR BODY MASS INDEX (BMI) DOCUMENTED: ICD-10-PCS | Mod: CPTII,S$GLB,, | Performed by: OTOLARYNGOLOGY

## 2023-03-30 RX ORDER — LINEZOLID 100 MG/5ML
GRANULE, FOR SUSPENSION ORAL
COMMUNITY

## 2023-03-30 RX ORDER — HYDROCODONE BITARTRATE AND ACETAMINOPHEN 7.5; 325 MG/15ML; MG/15ML
15 SOLUTION ORAL 4 TIMES DAILY PRN
Qty: 450 ML | Refills: 0 | Status: SHIPPED | OUTPATIENT
Start: 2023-03-30

## 2023-03-30 RX ORDER — CHLORHEXIDINE GLUCONATE ORAL RINSE 1.2 MG/ML
SOLUTION DENTAL
COMMUNITY

## 2023-03-30 NOTE — TELEPHONE ENCOUNTER
Sw pt  informed she will be seen by speech at 2, due to the mbss being denied. Facility not in network

## 2023-04-03 NOTE — PROGRESS NOTES
No chief complaint on file.    Oncology History   Malignant neoplasm of oral cavity   2013 Surgery    L Segmental mandibulectomy, chemo/RT - [facility] - 2013 2013 Initial Diagnosis    Malignant neoplasm of oral cavity       2015 Surgery     L mandible ORN, osteocutaneous RFFF - [facility] - ~2015         4/2018 Biopsy    Biopsy R RMT - [place] - 04/2018 5/30/2019 Surgery    1.  Composite resection of the right mandible, buccal mucosa and floor of mouth  2.  Right modified neck dissection including levels 1 B through 4  3.  Right fibula free flap with inset into the mouth.  4.  Mandibular reconstruction with transosteal plate.  5.  Right condylar reconstruction.  6.  Site preparation for flap inset.  7.  Tracheostomy.  8.  Neck exploration for vessels with anastomosis into the right facial artery and right common facial vein.  9.  Split-thickness skin graft from the leg measuring 15 x 6 cm.  10.  AlloDerm with inset into the right glenoid fossa.       7/9/2019 Cancer Staged    Staging form: Oral Cavity, AJCC 8th Edition  - Pathologic: Stage I (pT1, pN0, cM0) - Signed by Isabella Perea NP on 7/9/2019 9/28/2022 Surgery    1. Revision tracheostomy   2. Right partial glossectomy with resection of floor of mouth/buccal mucosa/mandibular gingiva/tongue base and right soft palate  3. Right pectoralis major myocutaneous regional flap for coverage of oral cavity defect 11x 6 cm  4. Complex vestibuloplasty   5. Pharyngoplasty   6.  Advancement flap for closure of secondary chest defect with advanced area being 20 x 22 cm    7. Tracheostomy tube exchange     Squamous cell carcinoma   5/30/2019 Initial Diagnosis    Squamous cell carcinoma             HPI   67 y.o. female presents with the above treatment history.  Overall, she is doing quite well.  She is concerned about bleeding from her PEG site.  She is interested in advancing her diet and debulking of her pec flap. Her MBSS was not performed today due to an insurance  issue.    Review of Systems   Constitutional: Negative for fatigue and unexpected weight change.   HENT: Per HPI.  Eyes: Negative for visual disturbance.   Respiratory: Negative for shortness of breath, hemoptysis   Cardiovascular: Negative for chest pain and palpitations.   Musculoskeletal: Negative for decreased ROM, back pain.   Skin: Negative for rash, sunburn, itching.   Neurological: Negative for dizziness and seizures.   Hematological: Negative for adenopathy. Does not bruise/bleed easily.   Endocrine: Negative for rapid weight loss/weight gain, heat/cold intolerance.     Past Medical History   Patient Active Problem List   Diagnosis    Malignant neoplasm of oral cavity    Squamous cell carcinoma    Moderate malnutrition    Gastroesophageal reflux disease    Decreased mobility    Open wound of mouth    Open wound of right chest wall    Open wound of throat           Past Surgical History   Past Surgical History:   Procedure Laterality Date    APPENDECTOMY      CHOLECYSTECTOMY      DISSECTION OF NECK Right 5/30/2019    Procedure: DISSECTION, NECK;  Surgeon: Wallace Santiago MD;  Location: [address];  Service: ENT;  Laterality: Right;    ESOPHAGOGASTRODUODENOSCOPY W/ PEG  5/30/2019    Procedure: EGD, WITH PEG TUBE INSERTION;  Surgeon: Ottoniel Jimenez MD;  Location: [address];  Service: General;;    FLAP PROCEDURE Right 5/30/2019    Procedure: CREATION, FREE FLAP;  Surgeon: Navdeep Dangelo MD;  Location: [address];  Service: Plastics;  Laterality: Right;  ischemia time [id number]  flap rt lower leg to rt neck    FLAP PROCEDURE Right 9/28/2022    Procedure: CREATION, FREE FLAP;  Surgeon: Milly Price MD;  Location: [address];  Service: ENT;  Laterality: Right;    HYSTERECTOMY      MANDIBLE SURGERY      SURGICAL REMOVAL OF NEOPLASM OF MOUTH Right 9/28/2022    Procedure: EXCISION, NEOPLASM, MOUTH;  Surgeon: Wallace Santiago MD;  Location: [address];  Service: ENT;   Laterality: Right;    TRACHEOTOMY N/A 5/30/2019    Procedure: TRACHEOTOMY;  Surgeon: Navdeep Dangelo MD;  Location: [facility] OR [address] [place] [address];  Service: Plastics;  Laterality: N/A;    TRACHEOTOMY N/A 9/28/2022    Procedure: TRACHEOTOMY;  Surgeon: Wallace Santiago MD;  Location: [facility] OR [address] [place] [address];  Service: ENT;  Laterality: N/A;         Family History   History reviewed. No pertinent family history.        Social History   .  Social History     Socioeconomic History    Marital status:    Tobacco Use    Smoking status: Former    Smokeless tobacco: Never   Substance and Sexual Activity    Alcohol use: Never    Drug use: Never     Social Determinants of Health     Financial Resource Strain: High Risk    Difficulty of Paying Living Expenses: Hard   Food Insecurity: Food Insecurity Present    Worried About Running Out of Food in the Last Year: Sometimes true    Ran Out of Food in the Last Year: Sometimes true   Transportation Needs: No Transportation Needs    Lack of Transportation (Medical): No    Lack of Transportation (Non-Medical): No   Physical Activity: Inactive    Days of Exercise per Week: 0 days    Minutes of Exercise per Session: 0 min   Stress: No Stress Concern Present    Feeling of Stress : Only a little   Social Connections: Moderately Isolated    Frequency of Communication with Friends and Family: More than three times a week    Frequency of Social Gatherings with Friends and Family: More than three times a week    Attends Rastafari Services: Never    Active Member of Clubs or Organizations: No    Attends Club or Organization Meetings: Never    Marital Status:    Housing Stability: High Risk    Unable to Pay for Housing in the Last Year: Yes    Number of Places Lived in the Last Year: 1    Unstable Housing in the Last Year: No         Allergies   Review of patient's allergies indicates:   Allergen Reactions    Bactrim [sulfamethoxazole-trimethoprim]     Clindamycin     Keflex [cephalexin]       Tolerated Unasyn 05/31/2019    Tramadol      DIZZY AND NAUSEA, & VOMITTING           Physical Exam     Vitals:    03/30/23 1515   BP: 93/63   Pulse: 64         Body mass index is 18.82 kg/m².      General: AOx3, NAD   Respiratory:  Symmetric chest rise, normal effort  Oral Cavity:  Oral Tongue within the range of motion.  No worrisome lesions.  Multiple flaps incorporated into oral cavity.  Her left submental orocutaneous fistula has closed.. Hard Palate WNL. No buccal or FOM lesions.  Oropharynx:  No masses/lesions of the posterior pharyngeal wall. Tonsillar fossa without lesions. Soft palate without masses. Midline uvula.   Neck:  Multiple scars.  Marked fibrosis status post surgery and radiation..  No cervical lymphadenopathy, thyromegaly or thyroid nodules.  Normal range of motion.    Face: House Brackmann I bilaterally.     PEG site without evidence of bleeding or infetcion.    Assessment/Plan  Problem List Items Addressed This Visit          Oncology    Malignant neoplasm of oral cavity - Primary     ANIYA.  Her MBSS was not performed today for insurance reasons.  Will attempt to reschedule.  If not successful, will arrange in [place].  RTC 6 weeks.           Relevant Medications    hydrocodone-acetaminophen (HYCET) solution 7.5-325 mg/15mL

## 2023-04-03 NOTE — ASSESSMENT & PLAN NOTE
ANIYA.  Her MBSS was not performed today for insurance reasons.  Will attempt to reschedule.  If not successful, will arrange in [place].  RTC 6 weeks.

## 2023-04-25 ENCOUNTER — TELEPHONE (OUTPATIENT)
Dept: OTOLARYNGOLOGY | Facility: CLINIC | Age: 68
End: 2023-04-25
Payer: MEDICARE

## 2023-05-11 ENCOUNTER — OFFICE VISIT (OUTPATIENT)
Dept: OTOLARYNGOLOGY | Facility: CLINIC | Age: 68
End: 2023-05-11
Payer: MEDICARE

## 2023-05-11 VITALS
HEART RATE: 72 BPM | HEIGHT: 63 IN | WEIGHT: 102.06 LBS | BODY MASS INDEX: 18.08 KG/M2 | SYSTOLIC BLOOD PRESSURE: 110 MMHG | DIASTOLIC BLOOD PRESSURE: 67 MMHG

## 2023-05-11 DIAGNOSIS — C06.9 MALIGNANT NEOPLASM OF ORAL CAVITY: Primary | ICD-10-CM

## 2023-05-11 PROCEDURE — 1159F MED LIST DOCD IN RCRD: CPT | Mod: CPTII,S$GLB,, | Performed by: OTOLARYNGOLOGY

## 2023-05-11 PROCEDURE — 99213 OFFICE O/P EST LOW 20 MIN: CPT | Mod: S$GLB,,, | Performed by: OTOLARYNGOLOGY

## 2023-05-11 PROCEDURE — 3074F PR MOST RECENT SYSTOLIC BLOOD PRESSURE < 130 MM HG: ICD-10-PCS | Mod: CPTII,S$GLB,, | Performed by: OTOLARYNGOLOGY

## 2023-05-11 PROCEDURE — 1160F PR REVIEW ALL MEDS BY PRESCRIBER/CLIN PHARMACIST DOCUMENTED: ICD-10-PCS | Mod: CPTII,S$GLB,, | Performed by: OTOLARYNGOLOGY

## 2023-05-11 PROCEDURE — 1160F RVW MEDS BY RX/DR IN RCRD: CPT | Mod: CPTII,S$GLB,, | Performed by: OTOLARYNGOLOGY

## 2023-05-11 PROCEDURE — 1159F PR MEDICATION LIST DOCUMENTED IN MEDICAL RECORD: ICD-10-PCS | Mod: CPTII,S$GLB,, | Performed by: OTOLARYNGOLOGY

## 2023-05-11 PROCEDURE — 3078F DIAST BP <80 MM HG: CPT | Mod: CPTII,S$GLB,, | Performed by: OTOLARYNGOLOGY

## 2023-05-11 PROCEDURE — 3078F PR MOST RECENT DIASTOLIC BLOOD PRESSURE < 80 MM HG: ICD-10-PCS | Mod: CPTII,S$GLB,, | Performed by: OTOLARYNGOLOGY

## 2023-05-11 PROCEDURE — 99999 PR PBB SHADOW E&M-EST. PATIENT-LVL III: ICD-10-PCS | Mod: PBBFAC,,, | Performed by: OTOLARYNGOLOGY

## 2023-05-11 PROCEDURE — 3074F SYST BP LT 130 MM HG: CPT | Mod: CPTII,S$GLB,, | Performed by: OTOLARYNGOLOGY

## 2023-05-11 PROCEDURE — 1125F AMNT PAIN NOTED PAIN PRSNT: CPT | Mod: CPTII,S$GLB,, | Performed by: OTOLARYNGOLOGY

## 2023-05-11 PROCEDURE — 1125F PR PAIN SEVERITY QUANTIFIED, PAIN PRESENT: ICD-10-PCS | Mod: CPTII,S$GLB,, | Performed by: OTOLARYNGOLOGY

## 2023-05-11 PROCEDURE — 99999 PR PBB SHADOW E&M-EST. PATIENT-LVL III: CPT | Mod: PBBFAC,,, | Performed by: OTOLARYNGOLOGY

## 2023-05-11 PROCEDURE — 3008F BODY MASS INDEX DOCD: CPT | Mod: CPTII,S$GLB,, | Performed by: OTOLARYNGOLOGY

## 2023-05-11 PROCEDURE — 3008F PR BODY MASS INDEX (BMI) DOCUMENTED: ICD-10-PCS | Mod: CPTII,S$GLB,, | Performed by: OTOLARYNGOLOGY

## 2023-05-11 PROCEDURE — 99213 PR OFFICE/OUTPT VISIT, EST, LEVL III, 20-29 MIN: ICD-10-PCS | Mod: S$GLB,,, | Performed by: OTOLARYNGOLOGY

## 2023-05-11 NOTE — PROGRESS NOTES
CC: Surveillance    Oncology History   Malignant neoplasm of oral cavity   2013 Surgery    L Segmental mandibulectomy, chemo/RT - [facility] - 2013 2013 Initial Diagnosis    Malignant neoplasm of oral cavity       2015 Surgery     L mandible ORN, osteocutaneous RFFF - [facility] - ~2015 4/2018 Biopsy    Biopsy R RMT - [place] - 04/2018 5/30/2019 Surgery    1.  Composite resection of the right mandible, buccal mucosa and floor of mouth  2.  Right modified neck dissection including levels 1 B through 4  3.  Right fibula free flap with inset into the mouth.  4.  Mandibular reconstruction with transosteal plate.  5.  Right condylar reconstruction.  6.  Site preparation for flap inset.  7.  Tracheostomy.  8.  Neck exploration for vessels with anastomosis into the right facial artery and right common facial vein.  9.  Split-thickness skin graft from the leg measuring 15 x 6 cm.  10.  AlloDerm with inset into the right glenoid fossa.       7/9/2019 Cancer Staged    Staging form: Oral Cavity, AJCC 8th Edition  - Pathologic: Stage I (pT1, pN0, cM0) - Signed by Isabella Perea NP on 7/9/2019 9/28/2022 Surgery    1. Revision tracheostomy   2. Right partial glossectomy with resection of floor of mouth/buccal mucosa/mandibular gingiva/tongue base and right soft palate  3. Right pectoralis major myocutaneous regional flap for coverage of oral cavity defect 11x 6 cm  4. Complex vestibuloplasty   5. Pharyngoplasty   6.  Advancement flap for closure of secondary chest defect with advanced area being 20 x 22 cm    7. Tracheostomy tube exchange     Squamous cell carcinoma   5/30/2019 Initial Diagnosis    Squamous cell carcinoma             HPI   67 y.o. female presents with the above treatment history.  Doing reasonably well overall.  MBSS scheduled later this month scheduled in [place].    Review of Systems   Constitutional: Negative for fatigue and unexpected weight change.   HENT: Per HPI.  Eyes: Negative for visual  disturbance.   Respiratory: Negative for shortness of breath, hemoptysis   Cardiovascular: Negative for chest pain and palpitations.   Musculoskeletal: Negative for decreased ROM, back pain.   Skin: Negative for rash, sunburn, itching.   Neurological: Negative for dizziness and seizures.   Hematological: Negative for adenopathy. Does not bruise/bleed easily.   Endocrine: Negative for rapid weight loss/weight gain, heat/cold intolerance.     Past Medical History   Patient Active Problem List   Diagnosis    Malignant neoplasm of oral cavity    Squamous cell carcinoma    Moderate malnutrition    Gastroesophageal reflux disease    Decreased mobility    Open wound of mouth    Open wound of right chest wall    Open wound of throat           Past Surgical History   Past Surgical History:   Procedure Laterality Date    APPENDECTOMY      CHOLECYSTECTOMY      DISSECTION OF NECK Right 5/30/2019    Procedure: DISSECTION, NECK;  Surgeon: Wallace Santiago MD;  Location: [address];  Service: ENT;  Laterality: Right;    ESOPHAGOGASTRODUODENOSCOPY W/ PEG  5/30/2019    Procedure: EGD, WITH PEG TUBE INSERTION;  Surgeon: Ottoniel Jimenez MD;  Location: [address];  Service: General;;    FLAP PROCEDURE Right 5/30/2019    Procedure: CREATION, FREE FLAP;  Surgeon: Navdeep Dangelo MD;  Location: [address];  Service: Plastics;  Laterality: Right;  ischemia time [id number]  flap rt lower leg to rt neck    FLAP PROCEDURE Right 9/28/2022    Procedure: CREATION, FREE FLAP;  Surgeon: Milly Price MD;  Location: [address];  Service: ENT;  Laterality: Right;    HYSTERECTOMY      MANDIBLE SURGERY      SURGICAL REMOVAL OF NEOPLASM OF MOUTH Right 9/28/2022    Procedure: EXCISION, NEOPLASM, MOUTH;  Surgeon: Wallace Santiago MD;  Location: [address];  Service: ENT;  Laterality: Right;    TRACHEOTOMY N/A 5/30/2019    Procedure: TRACHEOTOMY;  Surgeon: Navdeep Dangelo MD;  Location: [address];  Service:  Plastics;  Laterality: N/A;    TRACHEOTOMY N/A 9/28/2022    Procedure: TRACHEOTOMY;  Surgeon: Wallace Santiago MD;  Location: [facility] OR [address] [place] [address];  Service: ENT;  Laterality: N/A;         Family History   History reviewed. No pertinent family history.        Social History   .  Social History     Socioeconomic History    Marital status:    Tobacco Use    Smoking status: Former    Smokeless tobacco: Never   Substance and Sexual Activity    Alcohol use: Never    Drug use: Never     Social Determinants of Health     Financial Resource Strain: High Risk    Difficulty of Paying Living Expenses: Hard   Food Insecurity: Food Insecurity Present    Worried About Running Out of Food in the Last Year: Sometimes true    Ran Out of Food in the Last Year: Sometimes true   Transportation Needs: No Transportation Needs    Lack of Transportation (Medical): No    Lack of Transportation (Non-Medical): No   Physical Activity: Inactive    Days of Exercise per Week: 0 days    Minutes of Exercise per Session: 0 min   Stress: No Stress Concern Present    Feeling of Stress : Only a little   Social Connections: Moderately Isolated    Frequency of Communication with Friends and Family: More than three times a week    Frequency of Social Gatherings with Friends and Family: More than three times a week    Attends Jainism Services: Never    Active Member of Clubs or Organizations: No    Attends Club or Organization Meetings: Never    Marital Status:    Housing Stability: High Risk    Unable to Pay for Housing in the Last Year: Yes    Number of Places Lived in the Last Year: 1    Unstable Housing in the Last Year: No         Allergies   Review of patient's allergies indicates:   Allergen Reactions    Bactrim [sulfamethoxazole-trimethoprim]     Clindamycin     Keflex [cephalexin]      Tolerated Unasyn 05/31/2019    Tramadol      DIZZY AND NAUSEA, & VOMITTING           Physical Exam     Vitals:    05/11/23 1307   BP: 110/67   Pulse:  72         Body mass index is 18.08 kg/m².      General: AOx3, NAD   Respiratory:  Symmetric chest rise, normal effort  Oral Cavity:  Oral Tongue within the range of motion.  No worrisome lesions.  Multiple flaps incorporated into oral cavity.  Her left submental orocutaneous fistula has closed.. Hard Palate WNL. No buccal or FOM lesions.  Oropharynx:  No masses/lesions of the posterior pharyngeal wall. Tonsillar fossa without lesions. Soft palate without masses. Midline uvula.   Neck:  Multiple scars.  Marked fibrosis status post surgery and radiation..  No cervical lymphadenopathy, thyromegaly or thyroid nodules.  Normal range of motion.    Face: House Brackmann I bilaterally.       Assessment/Plan  Problem List Items Addressed This Visit          Oncology    Malignant neoplasm of oral cavity - Primary     ANIYA.  MBSS later this month in [place].  RTC 6 weeks for surveillance.

## 2023-05-23 ENCOUNTER — PATIENT MESSAGE (OUTPATIENT)
Dept: RESEARCH | Facility: HOSPITAL | Age: 68
End: 2023-05-23
Payer: MEDICARE

## 2025-06-19 NOTE — SUBJECTIVE & OBJECTIVE
Interval History/Significant Events: NAEON. Remains afebrile and HDS. No complaints this morning. Drains with minimal output. Ready for stepdown to the floor.    Follow-up For: Procedure(s) (LRB):  EXCISION, NEOPLASM, MOUTH (Right)  TRACHEOTOMY (N/A)  CREATION, FREE FLAP (Right)  INSERTION, PEG TUBE (N/A)    Post-Operative Day: 3 Days Post-Op    Objective:     Vital Signs (Most Recent):  Temp: 98.4 °F (36.9 °C) (10/01/22 0301)  Pulse: 66 (10/01/22 0500)  Resp: 16 (10/01/22 0500)  BP: (!) 99/51 (10/01/22 0500)  SpO2: 98 % (10/01/22 0500)   Vital Signs (24h Range):  Temp:  [98.4 °F (36.9 °C)-98.8 °F (37.1 °C)] 98.4 °F (36.9 °C)  Pulse:  [61-88] 66  Resp:  [15-23] 16  SpO2:  [92 %-100 %] 98 %  BP: ()/(44-63) 99/51     Weight: 56.2 kg (124 lb)  Body mass index is 21.97 kg/m².      Intake/Output Summary (Last 24 hours) at 10/1/2022 0640  Last data filed at 10/1/2022 0600  Gross per 24 hour   Intake 1548.58 ml   Output 685 ml   Net 863.58 ml       Physical Exam  Vitals and nursing note reviewed.   Constitutional:       General: She is not in acute distress.  HENT:      Head:      Comments: Changes noted consistent with prior left mandibular surgery  Eyes:      Extraocular Movements: Extraocular movements intact.      Conjunctiva/sclera: Conjunctivae normal.   Neck:      Comments: Neck drain in place with SS drainage  Staple line CDI, bulge noted secondary to pec flap  Tracheostomy with trach collar  Cardiovascular:      Rate and Rhythm: Normal rate and regular rhythm.   Pulmonary:      Effort: Pulmonary effort is normal.      Breath sounds: Normal breath sounds.   Abdominal:      General: Abdomen is flat. Bowel sounds are normal. There is no distension.      Palpations: Abdomen is soft.      Comments: PEG tube in place   Genitourinary:     Comments: Purewick in place  Musculoskeletal:      Comments: Left chest staple line CDI    Skin:     Comments:  2 chest drains in place with minimal SS drainage   Neurological:       Mental Status: She is alert and easily aroused.       Vents:  Oxygen Concentration (%): 28 (10/01/22 0500)    Lines/Drains/Airways       Drain  Duration                  Gastrostomy/Enterostomy 05/30/19 1140 Percutaneous endoscopic gastrostomy (PEG) LUQ feeding 1219 days         Closed/Suction Drain 09/28/22 1227 Right Chest Bulb 15 Fr. 2 days         Closed/Suction Drain 09/28/22 1228 Right Chest Bulb 15 Fr. 2 days         Closed/Suction Drain 09/28/22 1320 Right Neck Bulb 10 Fr. 2 days         Gastrostomy/Enterostomy 09/28/22 0944 Gastrostomy tube w/ balloon midline feeding 2 days    Female External Urinary Catheter 09/30/22 0608 1 day              Airway  Duration                  Surgical Airway 06/06/19 0800 Shiley Uncuffed 1212 days         Airway - Non-Surgical 09/28/22 0917 2 days         Surgical Airway 09/28/22 1302 Shiley Cuffed 2 days              Peripheral Intravenous Line  Duration                  Peripheral IV - Single Lumen 09/28/22 1118 18 G Right Ankle 2 days         Peripheral IV - Single Lumen 09/30/22 1019 20 G;1 3/4 in Left Forearm <1 day                    Significant Labs:    CBC/Anemia Profile:  Recent Labs   Lab 09/30/22  0242 10/01/22  0331   WBC 6.53 8.99   HGB 8.1* 8.5*   HCT 25.2* 26.0*   * 135*   MCV 97 95   RDW 13.1 12.8        Chemistries:  Recent Labs   Lab 09/30/22  0242 09/30/22  0619 10/01/22  0331   * 131* 133*   K 3.8 3.7 3.8    104 106   CO2 21* 23 20*   BUN 9 10 10   CREATININE 0.6 0.7 0.6   CALCIUM 7.7* 7.9* 8.2*   MG 1.8 1.8  --    PHOS 2.6* 2.4*  --        All pertinent labs within the past 24 hours have been reviewed.    Significant Imaging:  I have reviewed all pertinent imaging results/findings within the past 24 hours.   No

## (undated) DEVICE — CATH IV INTROCAN 22G X 1

## (undated) DEVICE — LUBRICANT SURGILUBE 2 OZ

## (undated) DEVICE — MICRO CLIP

## (undated) DEVICE — PROBE CATH TEMP 16 FRFOLEY 400

## (undated) DEVICE — SOL NS 1000CC

## (undated) DEVICE — SUT BONE WAX 2.5 GRMS 12/BX

## (undated) DEVICE — GAUZE SPONGE PEANUT STRL

## (undated) DEVICE — DRAPE OPMI STERILE

## (undated) DEVICE — SUT VICRYL PLUS 3-0 SH 18IN

## (undated) DEVICE — CLIP DOUBLE MICRO.

## (undated) DEVICE — DRESSING AQUACEL SACRAL 9 X 9

## (undated) DEVICE — STAPLER SKIN PROXIMATE WIDE

## (undated) DEVICE — CATH IV INTROCAN 20G X 1.1

## (undated) DEVICE — SUT COATED VICRYL 4/0 27IN

## (undated) DEVICE — SPONGE LAP 18X18 PREWASHED

## (undated) DEVICE — SUT SILK 2-0 SH 18IN BLACK

## (undated) DEVICE — SUT 2-0 12-18IN SILK

## (undated) DEVICE — SUT SILK 2-0 PS 18IN BLACK

## (undated) DEVICE — PAD K-THERMIA 24IN X 60IN

## (undated) DEVICE — SHEARS HARMONIC CRVD 9 CM

## (undated) DEVICE — SUCTION SURGICAL FRAZR

## (undated) DEVICE — CONTAINER SPECIMEN STRL 4OZ

## (undated) DEVICE — NDL HYPO REG 25G X 1 1/2

## (undated) DEVICE — SEE L#95700

## (undated) DEVICE — KIT URINARY CATH URINE METER

## (undated) DEVICE — SET DECANTER MEDICHOICE

## (undated) DEVICE — BOVIE SUCTION

## (undated) DEVICE — SYR 3CC LUER LOC

## (undated) DEVICE — STAPLER SKIN ROTATING HEAD

## (undated) DEVICE — SPONGE PATTY SURGICAL .5X3IN

## (undated) DEVICE — DRAPE CORETEMP FLD WRM 56X62IN

## (undated) DEVICE — TRAY MINOR GEN SURG

## (undated) DEVICE — KIT PEG PULL STANDARD 20F

## (undated) DEVICE — CLIP MED TICALL

## (undated) DEVICE — ADHESIVE DERMABOND ADVANCED

## (undated) DEVICE — SUT MCRYL PLUS 4-0 PS2 27IN

## (undated) DEVICE — DRAIN CHANNEL ROUND 15FR

## (undated) DEVICE — DRAPE HALF SURGICAL 40X58IN

## (undated) DEVICE — HOOK LONE STAR BLUNT 12MM

## (undated) DEVICE — BOOT AIR FLUID HEEL ADLT STD

## (undated) DEVICE — DRESSING XEROFORM GAUZE 5X9

## (undated) DEVICE — BOWL STERILE LARGE 32OZ

## (undated) DEVICE — CLAMP SINGLE MICRO.

## (undated) DEVICE — DRAPE STERI INSTRUMENT 1018

## (undated) DEVICE — BLADE SAGITTA 5/BX

## (undated) DEVICE — NDL BOX COUNTER

## (undated) DEVICE — SUT 3-0 12-18IN SILK

## (undated) DEVICE — SUT LIGACLIP SMALL XTRA

## (undated) DEVICE — SEE MEDLINE ITEM 157194

## (undated) DEVICE — TRACH TUBE CUFF FLEX DISP 7.5

## (undated) DEVICE — KIT MEROCEL INSTRUMENT WIPE

## (undated) DEVICE — BLADE SURG #15 CARBON STEEL

## (undated) DEVICE — TRAY FOLEY 16FR INFECTION CONT

## (undated) DEVICE — BURR LONG PINEAPPLE

## (undated) DEVICE — DRAIN CHANNEL ROUND 10FR

## (undated) DEVICE — GOWN SURGICAL X-LARGE

## (undated) DEVICE — SUT PROLENE 5-0 PS-2 18

## (undated) DEVICE — TUBING SUC UNIV W/CONN 12FT

## (undated) DEVICE — COVER LIGHT HANDLE 80/CA

## (undated) DEVICE — SHEET EENT SPLIT

## (undated) DEVICE — GLOVE GAMMEX SURG LF PI SZ 7.5

## (undated) DEVICE — TAPE SILK 3IN

## (undated) DEVICE — SEE MEDLINE ITEM 154981

## (undated) DEVICE — KIT SAHARA DRAPE DRAW/LIFT

## (undated) DEVICE — CUP MEDICINE STERILE 2OZ

## (undated) DEVICE — WARMER DRAPE STERILE LF

## (undated) DEVICE — Device

## (undated) DEVICE — INSTRUMENT SURG SUCT FRZR W/C

## (undated) DEVICE — TRAY SKIN SCRUB WET PREMIUM

## (undated) DEVICE — PACK UNIVERSAL SPLIT II

## (undated) DEVICE — SUT ETHILON 3-0 PS2 18 BLK

## (undated) DEVICE — SPONGE WEC CEL SPEARS

## (undated) DEVICE — ELECTRODE REM PLYHSV RETURN 9

## (undated) DEVICE — DRAPE EENT SPLIT STERILE

## (undated) DEVICE — SUT 9/0 5IN ETHILON BLK MON

## (undated) DEVICE — SKINMARKER & RULER REGULAR X-F

## (undated) DEVICE — GUIDE MICRO-GRID SIL GRN

## (undated) DEVICE — SUT VICRYL 3-0 27 SH

## (undated) DEVICE — SEE MEDLINE ITEM 152622

## (undated) DEVICE — DRESSING TRANS 6X8 TEGADERM

## (undated) DEVICE — BLADE DERMATOME 10/BOX

## (undated) DEVICE — BANDAGE KERLIX AMD

## (undated) DEVICE — TRAY ENT 4/CS

## (undated) DEVICE — SEE MEDLINE ITEM 152487

## (undated) DEVICE — SUCTION COAGULATOR 10FR 6IN

## (undated) DEVICE — CORD BIPOLAR 12 FOOT

## (undated) DEVICE — SUT 2/0 30IN SILK BLK BRAI

## (undated) DEVICE — COUPLER SYSTEM MICRO VASC ANAS: Type: IMPLANTABLE DEVICE | Site: MANDIBLE | Status: NON-FUNCTIONAL

## (undated) DEVICE — ELECTRODE BLADE INSULATED 1 IN

## (undated) DEVICE — URINARY DRAINAGE BAG

## (undated) DEVICE — TRAY CATH FOL SIL URIMTR 16FR

## (undated) DEVICE — TOWEL OR DISP STRL BLUE 4/PK

## (undated) DEVICE — BIT DRILL TWIST 1.6X7MM SS

## (undated) DEVICE — SUT SILK 3-0 SH 18IN BLACK

## (undated) DEVICE — SUT CTD VICRYL 3-0 CR/SH

## (undated) DEVICE — GOWN XX-LARGE

## (undated) DEVICE — TOURNIQUET SB QC DP 24X4IN

## (undated) DEVICE — DRAPE TOP 53X102IN

## (undated) DEVICE — CUP MEDICINE GRADUATED 1OZ

## (undated) DEVICE — SEE MEDLINE ITEM 157128